# Patient Record
Sex: FEMALE | Race: WHITE | NOT HISPANIC OR LATINO | Employment: UNEMPLOYED | URBAN - METROPOLITAN AREA
[De-identification: names, ages, dates, MRNs, and addresses within clinical notes are randomized per-mention and may not be internally consistent; named-entity substitution may affect disease eponyms.]

---

## 2017-10-05 ENCOUNTER — APPOINTMENT (OUTPATIENT)
Dept: LAB | Facility: CLINIC | Age: 42
End: 2017-10-05
Payer: COMMERCIAL

## 2017-10-05 ENCOUNTER — TRANSCRIBE ORDERS (OUTPATIENT)
Dept: MRI IMAGING | Facility: CLINIC | Age: 42
End: 2017-10-05

## 2017-10-05 DIAGNOSIS — L65.9 BALDNESS: ICD-10-CM

## 2017-10-05 DIAGNOSIS — Z79.899 NEED FOR PROPHYLACTIC CHEMOTHERAPY: Primary | ICD-10-CM

## 2017-10-05 DIAGNOSIS — E28.2 POLYCYSTIC OVARIES: Primary | ICD-10-CM

## 2017-10-05 DIAGNOSIS — E66.01 MORBID OBESITY (HCC): ICD-10-CM

## 2017-10-05 DIAGNOSIS — Z79.899 NEED FOR PROPHYLACTIC CHEMOTHERAPY: ICD-10-CM

## 2017-10-05 DIAGNOSIS — E28.2 POLYCYSTIC OVARIES: ICD-10-CM

## 2017-10-05 LAB
25(OH)D3 SERPL-MCNC: 16 NG/ML (ref 30–100)
ALBUMIN SERPL BCP-MCNC: 3.7 G/DL (ref 3.5–5)
ALP SERPL-CCNC: 65 U/L (ref 46–116)
ALT SERPL W P-5'-P-CCNC: 22 U/L (ref 12–78)
ANION GAP SERPL CALCULATED.3IONS-SCNC: 7 MMOL/L (ref 4–13)
AST SERPL W P-5'-P-CCNC: 12 U/L (ref 5–45)
BASOPHILS # BLD AUTO: 0.01 THOUSANDS/ΜL (ref 0–0.1)
BASOPHILS NFR BLD AUTO: 0 % (ref 0–1)
BILIRUB DIRECT SERPL-MCNC: 0.1 MG/DL (ref 0–0.2)
BILIRUB SERPL-MCNC: 0.28 MG/DL (ref 0.2–1)
BUN SERPL-MCNC: 14 MG/DL (ref 5–25)
CALCIUM SERPL-MCNC: 9 MG/DL (ref 8.3–10.1)
CHLORIDE SERPL-SCNC: 107 MMOL/L (ref 100–108)
CHOLEST SERPL-MCNC: 151 MG/DL (ref 50–200)
CO2 SERPL-SCNC: 23 MMOL/L (ref 21–32)
CREAT SERPL-MCNC: 0.69 MG/DL (ref 0.6–1.3)
EOSINOPHIL # BLD AUTO: 0.11 THOUSAND/ΜL (ref 0–0.61)
EOSINOPHIL NFR BLD AUTO: 2 % (ref 0–6)
ERYTHROCYTE [DISTWIDTH] IN BLOOD BY AUTOMATED COUNT: 14.5 % (ref 11.6–15.1)
FSH SERPL-ACNC: 7 MIU/ML
GFR SERPL CREATININE-BSD FRML MDRD: 107 ML/MIN/1.73SQ M
GLUCOSE P FAST SERPL-MCNC: 108 MG/DL (ref 65–99)
HCT VFR BLD AUTO: 39.2 % (ref 34.8–46.1)
HDLC SERPL-MCNC: 46 MG/DL (ref 40–60)
HGB BLD-MCNC: 13.2 G/DL (ref 11.5–15.4)
LDLC SERPL CALC-MCNC: 87 MG/DL (ref 0–100)
LH SERPL-ACNC: 8.2 MIU/ML
LYMPHOCYTES # BLD AUTO: 1.7 THOUSANDS/ΜL (ref 0.6–4.47)
LYMPHOCYTES NFR BLD AUTO: 26 % (ref 14–44)
MCH RBC QN AUTO: 29.7 PG (ref 26.8–34.3)
MCHC RBC AUTO-ENTMCNC: 33.7 G/DL (ref 31.4–37.4)
MCV RBC AUTO: 88 FL (ref 82–98)
MONOCYTES # BLD AUTO: 0.44 THOUSAND/ΜL (ref 0.17–1.22)
MONOCYTES NFR BLD AUTO: 7 % (ref 4–12)
NEUTROPHILS # BLD AUTO: 4.34 THOUSANDS/ΜL (ref 1.85–7.62)
NEUTS SEG NFR BLD AUTO: 65 % (ref 43–75)
NRBC BLD AUTO-RTO: 0 /100 WBCS
PLATELET # BLD AUTO: 363 THOUSANDS/UL (ref 149–390)
PMV BLD AUTO: 11.5 FL (ref 8.9–12.7)
POTASSIUM SERPL-SCNC: 4.1 MMOL/L (ref 3.5–5.3)
PROT SERPL-MCNC: 7.6 G/DL (ref 6.4–8.2)
RBC # BLD AUTO: 4.44 MILLION/UL (ref 3.81–5.12)
SODIUM SERPL-SCNC: 137 MMOL/L (ref 136–145)
T4 FREE SERPL-MCNC: 0.99 NG/DL (ref 0.76–1.46)
TRIGL SERPL-MCNC: 90 MG/DL
TSH SERPL DL<=0.05 MIU/L-ACNC: 1.38 UIU/ML (ref 0.36–3.74)
VIT B12 SERPL-MCNC: 233 PG/ML (ref 100–900)
WBC # BLD AUTO: 6.62 THOUSAND/UL (ref 4.31–10.16)

## 2017-10-05 PROCEDURE — 85025 COMPLETE CBC W/AUTO DIFF WBC: CPT

## 2017-10-05 PROCEDURE — 82306 VITAMIN D 25 HYDROXY: CPT

## 2017-10-05 PROCEDURE — 80053 COMPREHEN METABOLIC PANEL: CPT

## 2017-10-05 PROCEDURE — 83002 ASSAY OF GONADOTROPIN (LH): CPT

## 2017-10-05 PROCEDURE — 86376 MICROSOMAL ANTIBODY EACH: CPT

## 2017-10-05 PROCEDURE — 83036 HEMOGLOBIN GLYCOSYLATED A1C: CPT

## 2017-10-05 PROCEDURE — 84402 ASSAY OF FREE TESTOSTERONE: CPT

## 2017-10-05 PROCEDURE — 83001 ASSAY OF GONADOTROPIN (FSH): CPT

## 2017-10-05 PROCEDURE — 84439 ASSAY OF FREE THYROXINE: CPT

## 2017-10-05 PROCEDURE — 84403 ASSAY OF TOTAL TESTOSTERONE: CPT

## 2017-10-05 PROCEDURE — 84443 ASSAY THYROID STIM HORMONE: CPT

## 2017-10-05 PROCEDURE — 86800 THYROGLOBULIN ANTIBODY: CPT

## 2017-10-05 PROCEDURE — 80061 LIPID PANEL: CPT

## 2017-10-05 PROCEDURE — 82627 DEHYDROEPIANDROSTERONE: CPT

## 2017-10-05 PROCEDURE — 36415 COLL VENOUS BLD VENIPUNCTURE: CPT

## 2017-10-05 PROCEDURE — 82607 VITAMIN B-12: CPT

## 2017-10-05 PROCEDURE — 82248 BILIRUBIN DIRECT: CPT

## 2017-10-05 PROCEDURE — 83498 ASY HYDROXYPROGESTERONE 17-D: CPT

## 2017-10-06 LAB
DHEA-S SERPL-MCNC: 106 UG/DL (ref 57.3–279.2)
EST. AVERAGE GLUCOSE BLD GHB EST-MCNC: 120 MG/DL
HBA1C MFR BLD: 5.8 % (ref 4.2–6.3)
TESTOST FREE SERPL-MCNC: 1.2 PG/ML (ref 0–4.2)
TESTOST SERPL-MCNC: 16 NG/DL (ref 8–48)
THYROGLOB AB SERPL-ACNC: <1 IU/ML (ref 0–0.9)
THYROPEROXIDASE AB SERPL-ACNC: 10 IU/ML (ref 0–34)

## 2017-10-11 LAB — 17OHP SERPL-MCNC: 36 NG/DL

## 2018-07-17 RX ORDER — LISDEXAMFETAMINE DIMESYLATE 30 MG/1
60 CAPSULE ORAL AS NEEDED
Refills: 0 | COMMUNITY
Start: 2018-05-18

## 2018-07-17 RX ORDER — ALPRAZOLAM 1 MG/1
1 TABLET ORAL DAILY PRN
Refills: 0 | COMMUNITY
Start: 2018-06-20

## 2018-07-17 RX ORDER — VORTIOXETINE 5 MG/1
5 TABLET, FILM COATED ORAL
Refills: 0 | COMMUNITY
Start: 2018-06-20 | End: 2018-07-19

## 2018-07-17 RX ORDER — TRAZODONE HYDROCHLORIDE 100 MG/1
TABLET ORAL
Refills: 0 | COMMUNITY
Start: 2018-06-20 | End: 2018-07-19 | Stop reason: SDUPTHER

## 2018-07-17 RX ORDER — VORTIOXETINE 10 MG/1
10 TABLET, FILM COATED ORAL
Refills: 0 | COMMUNITY
Start: 2018-06-20 | End: 2018-07-19 | Stop reason: SDUPTHER

## 2018-07-17 RX ORDER — BUSPIRONE HYDROCHLORIDE 5 MG/1
TABLET ORAL
COMMUNITY
Start: 2015-12-22 | End: 2018-07-19

## 2018-07-17 RX ORDER — OMEPRAZOLE 20 MG/1
1 CAPSULE, DELAYED RELEASE ORAL DAILY
COMMUNITY
Start: 2015-10-26 | End: 2018-07-19

## 2018-07-17 RX ORDER — TOPIRAMATE 100 MG/1
100 TABLET, FILM COATED ORAL DAILY
Refills: 1 | COMMUNITY
Start: 2018-04-17 | End: 2018-07-19

## 2018-07-17 RX ORDER — LAMOTRIGINE 100 MG/1
TABLET ORAL
COMMUNITY
Start: 2015-10-15 | End: 2018-07-19

## 2018-07-17 RX ORDER — ERGOCALCIFEROL 1.25 MG/1
50000 CAPSULE ORAL WEEKLY
Refills: 0 | COMMUNITY
Start: 2018-04-17 | End: 2020-03-10

## 2018-07-19 ENCOUNTER — OFFICE VISIT (OUTPATIENT)
Dept: INTERNAL MEDICINE CLINIC | Facility: CLINIC | Age: 43
End: 2018-07-19
Payer: COMMERCIAL

## 2018-07-19 ENCOUNTER — LAB (OUTPATIENT)
Dept: LAB | Facility: CLINIC | Age: 43
End: 2018-07-19
Payer: COMMERCIAL

## 2018-07-19 VITALS
WEIGHT: 261.6 LBS | BODY MASS INDEX: 39.65 KG/M2 | HEART RATE: 98 BPM | SYSTOLIC BLOOD PRESSURE: 110 MMHG | DIASTOLIC BLOOD PRESSURE: 80 MMHG | HEIGHT: 68 IN

## 2018-07-19 DIAGNOSIS — E78.5 DYSLIPIDEMIA: ICD-10-CM

## 2018-07-19 DIAGNOSIS — Z13.6 SCREENING FOR CARDIOVASCULAR CONDITION: ICD-10-CM

## 2018-07-19 DIAGNOSIS — F41.9 ANXIETY: ICD-10-CM

## 2018-07-19 DIAGNOSIS — R73.01 IMPAIRED FASTING BLOOD SUGAR: ICD-10-CM

## 2018-07-19 DIAGNOSIS — N60.09 CYST OF BREAST, UNSPECIFIED LATERALITY: ICD-10-CM

## 2018-07-19 DIAGNOSIS — K80.20 CALCULUS OF GALLBLADDER WITHOUT CHOLECYSTITIS WITHOUT OBSTRUCTION: ICD-10-CM

## 2018-07-19 DIAGNOSIS — F32.A DEPRESSION, UNSPECIFIED DEPRESSION TYPE: Primary | ICD-10-CM

## 2018-07-19 DIAGNOSIS — E53.8 VITAMIN B 12 DEFICIENCY: ICD-10-CM

## 2018-07-19 DIAGNOSIS — D64.9 ANEMIA, UNSPECIFIED TYPE: ICD-10-CM

## 2018-07-19 DIAGNOSIS — G47.33 OBSTRUCTIVE SLEEP APNEA: ICD-10-CM

## 2018-07-19 DIAGNOSIS — E55.9 VITAMIN D DEFICIENCY: ICD-10-CM

## 2018-07-19 LAB
BASOPHILS # BLD AUTO: 0.03 THOUSANDS/ΜL (ref 0–0.1)
BASOPHILS NFR BLD AUTO: 0 % (ref 0–1)
EOSINOPHIL # BLD AUTO: 0.1 THOUSAND/ΜL (ref 0–0.61)
EOSINOPHIL NFR BLD AUTO: 1 % (ref 0–6)
ERYTHROCYTE [DISTWIDTH] IN BLOOD BY AUTOMATED COUNT: 13.7 % (ref 11.6–15.1)
EST. AVERAGE GLUCOSE BLD GHB EST-MCNC: 105 MG/DL
HBA1C MFR BLD: 5.3 % (ref 4.2–6.3)
HCT VFR BLD AUTO: 43.4 % (ref 34.8–46.1)
HGB BLD-MCNC: 14 G/DL (ref 11.5–15.4)
IMM GRANULOCYTES # BLD AUTO: 0.03 THOUSAND/UL (ref 0–0.2)
IMM GRANULOCYTES NFR BLD AUTO: 0 % (ref 0–2)
LYMPHOCYTES # BLD AUTO: 1.61 THOUSANDS/ΜL (ref 0.6–4.47)
LYMPHOCYTES NFR BLD AUTO: 22 % (ref 14–44)
MCH RBC QN AUTO: 29 PG (ref 26.8–34.3)
MCHC RBC AUTO-ENTMCNC: 32.3 G/DL (ref 31.4–37.4)
MCV RBC AUTO: 90 FL (ref 82–98)
MONOCYTES # BLD AUTO: 0.57 THOUSAND/ΜL (ref 0.17–1.22)
MONOCYTES NFR BLD AUTO: 8 % (ref 4–12)
NEUTROPHILS # BLD AUTO: 5.09 THOUSANDS/ΜL (ref 1.85–7.62)
NEUTS SEG NFR BLD AUTO: 69 % (ref 43–75)
NRBC BLD AUTO-RTO: 0 /100 WBCS
PLATELET # BLD AUTO: 333 THOUSANDS/UL (ref 149–390)
PMV BLD AUTO: 11.4 FL (ref 8.9–12.7)
RBC # BLD AUTO: 4.83 MILLION/UL (ref 3.81–5.12)
WBC # BLD AUTO: 7.43 THOUSAND/UL (ref 4.31–10.16)

## 2018-07-19 PROCEDURE — 36415 COLL VENOUS BLD VENIPUNCTURE: CPT

## 2018-07-19 PROCEDURE — 83540 ASSAY OF IRON: CPT

## 2018-07-19 PROCEDURE — 80053 COMPREHEN METABOLIC PANEL: CPT

## 2018-07-19 PROCEDURE — 80061 LIPID PANEL: CPT

## 2018-07-19 PROCEDURE — 82728 ASSAY OF FERRITIN: CPT

## 2018-07-19 PROCEDURE — 84443 ASSAY THYROID STIM HORMONE: CPT

## 2018-07-19 PROCEDURE — 82652 VIT D 1 25-DIHYDROXY: CPT

## 2018-07-19 PROCEDURE — 82746 ASSAY OF FOLIC ACID SERUM: CPT

## 2018-07-19 PROCEDURE — 83918 ORGANIC ACIDS TOTAL QUANT: CPT

## 2018-07-19 PROCEDURE — 82607 VITAMIN B-12: CPT

## 2018-07-19 PROCEDURE — 82533 TOTAL CORTISOL: CPT

## 2018-07-19 PROCEDURE — 83550 IRON BINDING TEST: CPT

## 2018-07-19 PROCEDURE — 99396 PREV VISIT EST AGE 40-64: CPT | Performed by: NURSE PRACTITIONER

## 2018-07-19 PROCEDURE — 83036 HEMOGLOBIN GLYCOSYLATED A1C: CPT

## 2018-07-19 PROCEDURE — 85025 COMPLETE CBC W/AUTO DIFF WBC: CPT

## 2018-07-19 RX ORDER — TRAZODONE HYDROCHLORIDE 100 MG/1
TABLET ORAL
Qty: 60 TABLET | Refills: 0
Start: 2018-07-19 | End: 2020-03-10

## 2018-07-19 RX ORDER — VORTIOXETINE 10 MG/1
TABLET, FILM COATED ORAL
Qty: 60 TABLET | Refills: 0
Start: 2018-07-19 | End: 2019-11-01 | Stop reason: ALTCHOICE

## 2018-07-19 NOTE — PATIENT INSTRUCTIONS
Depression/anxiety/posttraumatic stress following with Dr Stacey Harrison    Obstructive sleep apnea using CPAP    History of of gallstones currently asymptomatic continue to monitor    Impaired fasting glucose check A1c    Health maintenance prescription for mammogram given    Check routine labs follow-up any abnormal

## 2018-07-19 NOTE — PROGRESS NOTES
Assessment/Plan:    Patient Instructions   Depression/anxiety/posttraumatic stress following with Dr Jose Delgado    Obstructive sleep apnea using CPAP    History of of gallstones currently asymptomatic continue to monitor    Impaired fasting glucose check A1c    Health maintenance prescription for mammogram given    Check routine labs follow-up any abnormal         Diagnoses and all orders for this visit:    Depression, unspecified depression type    Anxiety  -     TRINTELLIX 10 MG tablet; Take 2 tabs daily  -     traZODone (DESYREL) 100 mg tablet; Take 2 tabs  -     TSH, 3rd generation; Future  -     Cortisol; Future    Cyst of breast, unspecified laterality  -     Mammo diagnostic bilateral w cad; Future    Obstructive sleep apnea    Dyslipidemia  -     CBC and differential; Future  -     Comprehensive metabolic panel; Future    Calculus of gallbladder without cholecystitis without obstruction    Screening for cardiovascular condition  -     Lipid panel; Future    Impaired fasting blood sugar  -     Hemoglobin A1C; Future  -     Cortisol; Future    Vitamin D deficiency  -     Vitamin D 1,25 dihydroxy; Future    Anemia, unspecified type  -     Iron Panel; Future    Vitamin B 12 deficiency  -     Vitamin B12; Future  -     Folate; Future  -     Methylmalonic acid, serum; Future    Other orders  -     ALPRAZolam (XANAX) 1 mg tablet; Take 1 mg by mouth daily as needed for anxiety  -     Discontinue: busPIRone (BUSPAR) 5 mg tablet; Take by mouth  -     ergocalciferol (VITAMIN D2) 50,000 units; Take 50,000 Units by mouth once a week  -     Discontinue: lamoTRIgine (LaMICtal) 100 mg tablet; Take by mouth  -     VYVANSE 30 MG capsule; Take 30 mg by mouth daily  -     Discontinue: omeprazole (PriLOSEC) 20 mg delayed release capsule; Take 1 capsule by mouth daily  -     Discontinue: topiramate (TOPAMAX) 100 mg tablet;  Take 100 mg by mouth daily  -     Discontinue: traZODone (DESYREL) 100 mg tablet; TAKEK 2 TABLETS AT BEDTIME AS NEEDED FOR INSOMNIA  -     Discontinue: TRINTELLIX 10 MG tablet; Take 10 mg by mouth daily at bedtime  -     Discontinue: TRINTELLIX 5 MG tablet; Take 5 mg by mouth daily at bedtime         Subjective:      Patient ID: Betty Duran is a 39 y o  female    Patient has not been seen in approximately 2 and half years  She is on a fasting diet right now she just lost 14 pounds she will continue with weight loss efforts  Using CPAP  History of gallstones not having any problems  Following with Psychiatry for anxiety depression and posttraumatic stress  Active no formal exercise          Current Outpatient Prescriptions:     ALPRAZolam (XANAX) 1 mg tablet, Take 1 mg by mouth daily as needed for anxiety, Disp: , Rfl: 0    ergocalciferol (VITAMIN D2) 50,000 units, Take 50,000 Units by mouth once a week, Disp: , Rfl: 0    traZODone (DESYREL) 100 mg tablet, Take 2 tabs, Disp: 60 tablet, Rfl: 0    TRINTELLIX 10 MG tablet, Take 2 tabs daily, Disp: 60 tablet, Rfl: 0    VYVANSE 30 MG capsule, Take 30 mg by mouth daily, Disp: , Rfl: 0    No results found for this or any previous visit (from the past 1008 hour(s))  The following portions of the patient's history were reviewed and updated as appropriate: allergies, current medications, past family history, past medical history, past social history, past surgical history and problem list      Review of Systems   Constitutional: Negative for appetite change, chills, diaphoresis, fatigue, fever and unexpected weight change  HENT: Negative for postnasal drip and sneezing  Eyes: Negative for visual disturbance  Respiratory: Negative for chest tightness and shortness of breath  Cardiovascular: Negative for chest pain, palpitations and leg swelling  Gastrointestinal: Negative for abdominal pain and blood in stool  Endocrine: Negative for cold intolerance, heat intolerance, polydipsia, polyphagia and polyuria     Genitourinary: Negative for difficulty urinating, dysuria, frequency and urgency  Musculoskeletal: Negative for arthralgias and myalgias  Skin: Negative for rash and wound  Neurological: Negative for dizziness, weakness, light-headedness and headaches  Hematological: Negative for adenopathy  Psychiatric/Behavioral: Negative for confusion, dysphoric mood and sleep disturbance  The patient is not nervous/anxious  Objective:      Vitals:    07/19/18 1418   BP: 110/80   Pulse: 98          Physical Exam   Constitutional: She is oriented to person, place, and time  She appears well-developed  No distress  HENT:   Head: Normocephalic and atraumatic  Nose: Nose normal    Mouth/Throat: Oropharynx is clear and moist    Eyes: Conjunctivae and EOM are normal  Pupils are equal, round, and reactive to light  Neck: Normal range of motion  Neck supple  No JVD present  No tracheal deviation present  No thyromegaly present  Cardiovascular: Normal rate, regular rhythm, normal heart sounds and intact distal pulses  Exam reveals no gallop and no friction rub  No murmur heard  Pulmonary/Chest: Effort normal and breath sounds normal  No respiratory distress  She has no wheezes  She has no rales  Abdominal: Soft  Bowel sounds are normal  She exhibits no distension  There is no tenderness  Musculoskeletal: Normal range of motion  She exhibits no edema  Lymphadenopathy:     She has no cervical adenopathy  Neurological: She is alert and oriented to person, place, and time  No cranial nerve deficit  Skin: Skin is warm and dry  No rash noted  She is not diaphoretic  Psychiatric: She has a normal mood and affect   Her behavior is normal  Judgment and thought content normal

## 2018-07-20 LAB
ALBUMIN SERPL BCP-MCNC: 4.4 G/DL (ref 3.5–5)
ALP SERPL-CCNC: 64 U/L (ref 46–116)
ALT SERPL W P-5'-P-CCNC: 27 U/L (ref 12–78)
ANION GAP SERPL CALCULATED.3IONS-SCNC: 9 MMOL/L (ref 4–13)
AST SERPL W P-5'-P-CCNC: 14 U/L (ref 5–45)
BILIRUB SERPL-MCNC: 0.63 MG/DL (ref 0.2–1)
BUN SERPL-MCNC: 10 MG/DL (ref 5–25)
CALCIUM SERPL-MCNC: 9.7 MG/DL (ref 8.3–10.1)
CHLORIDE SERPL-SCNC: 105 MMOL/L (ref 100–108)
CHOLEST SERPL-MCNC: 182 MG/DL (ref 50–200)
CO2 SERPL-SCNC: 23 MMOL/L (ref 21–32)
CORTIS SERPL-MCNC: 11.2 UG/DL
CREAT SERPL-MCNC: 0.74 MG/DL (ref 0.6–1.3)
FERRITIN SERPL-MCNC: 49 NG/ML (ref 8–388)
FOLATE SERPL-MCNC: 17.9 NG/ML (ref 3.1–17.5)
GFR SERPL CREATININE-BSD FRML MDRD: 99 ML/MIN/1.73SQ M
GLUCOSE P FAST SERPL-MCNC: 70 MG/DL (ref 65–99)
HDLC SERPL-MCNC: 38 MG/DL (ref 40–60)
IRON SATN MFR SERPL: 15 %
IRON SERPL-MCNC: 62 UG/DL (ref 50–170)
LDLC SERPL CALC-MCNC: 124 MG/DL (ref 0–100)
NONHDLC SERPL-MCNC: 144 MG/DL
POTASSIUM SERPL-SCNC: 4.1 MMOL/L (ref 3.5–5.3)
PROT SERPL-MCNC: 8 G/DL (ref 6.4–8.2)
SODIUM SERPL-SCNC: 137 MMOL/L (ref 136–145)
TIBC SERPL-MCNC: 404 UG/DL (ref 250–450)
TRIGL SERPL-MCNC: 102 MG/DL
TSH SERPL DL<=0.05 MIU/L-ACNC: 1.15 UIU/ML (ref 0.36–3.74)
VIT B12 SERPL-MCNC: 3712 PG/ML (ref 100–900)

## 2018-07-23 LAB
1,25(OH)2D3 SERPL-MCNC: 105 PG/ML (ref 19.9–79.3)
METHYLMALONATE SERPL-SCNC: 56 NMOL/L (ref 0–378)
SL AMB DISCLAIMER: NORMAL

## 2018-07-25 ENCOUNTER — TELEPHONE (OUTPATIENT)
Dept: INTERNAL MEDICINE CLINIC | Facility: CLINIC | Age: 43
End: 2018-07-25

## 2018-07-25 NOTE — TELEPHONE ENCOUNTER
----- Message from Bebeto Villasenor sent at 7/25/2018  2:05 PM EDT -----  Her labs look good  Her vitamin d is very high, if she is taking a supplement she can probably hold of for a month

## 2018-07-25 NOTE — PROGRESS NOTES
Her labs look good  Her vitamin d is very high, if she is taking a supplement she can probably hold of for a month

## 2018-07-26 ENCOUNTER — TELEPHONE (OUTPATIENT)
Dept: INTERNAL MEDICINE CLINIC | Facility: CLINIC | Age: 43
End: 2018-07-26

## 2018-07-26 NOTE — TELEPHONE ENCOUNTER
----- Message from Delaney Crain sent at 7/26/2018 12:09 PM EDT -----  Tried home/cell phone no answer nor voicemail box set up  [facility] on [address] (emergency contact phone)  ----- Message -----  From: Shirley Hathaway MA  Sent: 7/25/2018   2:23 PM  To: [facility]    Tried phone number not available, and tried cell phone no answer nor voicemail box     Will CB later  ----- Message -----  From: TORI Machado  Sent: 7/25/2018   2:05 PM  To: [facility]    Her labs look good  Her vitamin d is very high, if she is taking a supplement she can probably hold of for a month

## 2018-07-27 ENCOUNTER — TELEPHONE (OUTPATIENT)
Dept: INTERNAL MEDICINE CLINIC | Facility: CLINIC | Age: 43
End: 2018-07-27

## 2018-07-27 NOTE — TELEPHONE ENCOUNTER
im not worried about it, its just rare to see a high vitmain d since most pts are deficient  She just certainly dose not need supplement   Otherwise labs noraml

## 2018-10-12 ENCOUNTER — HOSPITAL ENCOUNTER (OUTPATIENT)
Dept: MAMMOGRAPHY | Facility: CLINIC | Age: 43
Discharge: HOME | End: 2018-10-12
Payer: COMMERCIAL

## 2018-10-12 DIAGNOSIS — N60.09 CYST OF BREAST, UNSPECIFIED LATERALITY: ICD-10-CM

## 2018-10-12 PROCEDURE — 77067 SCR MAMMO BI INCL CAD: CPT

## 2018-10-12 PROCEDURE — 77063 BREAST TOMOSYNTHESIS BI: CPT

## 2018-10-22 ENCOUNTER — TELEPHONE (OUTPATIENT)
Dept: INTERNAL MEDICINE CLINIC | Facility: CLINIC | Age: 43
End: 2018-10-22

## 2018-10-23 NOTE — TELEPHONE ENCOUNTER
Mammogram looks negative, routine screening mammogram recommended in 1 yr        Martin Alva, anything suspicious on exam that your want u/s or referral?

## 2018-10-23 NOTE — TELEPHONE ENCOUNTER
Called pt and informed her of result per Dr Beena White and Colten Chowdary, pt was happy to hear yet surprised because she has had a suspicious MAMMO in the past

## 2019-01-04 ENCOUNTER — OFFICE VISIT (OUTPATIENT)
Dept: INTERNAL MEDICINE CLINIC | Facility: CLINIC | Age: 44
End: 2019-01-04
Payer: COMMERCIAL

## 2019-01-04 VITALS
BODY MASS INDEX: 41.49 KG/M2 | HEART RATE: 82 BPM | RESPIRATION RATE: 16 BRPM | HEIGHT: 68 IN | WEIGHT: 273.8 LBS | DIASTOLIC BLOOD PRESSURE: 90 MMHG | TEMPERATURE: 98.1 F | SYSTOLIC BLOOD PRESSURE: 120 MMHG

## 2019-01-04 DIAGNOSIS — J45.901 EXACERBATION OF ASTHMA, UNSPECIFIED ASTHMA SEVERITY, UNSPECIFIED WHETHER PERSISTENT: ICD-10-CM

## 2019-01-04 DIAGNOSIS — G47.33 OBSTRUCTIVE SLEEP APNEA: ICD-10-CM

## 2019-01-04 DIAGNOSIS — J40 BRONCHITIS: Primary | ICD-10-CM

## 2019-01-04 DIAGNOSIS — F31.81 BIPOLAR II DISORDER (HCC): ICD-10-CM

## 2019-01-04 PROCEDURE — 99214 OFFICE O/P EST MOD 30 MIN: CPT | Performed by: PHYSICIAN ASSISTANT

## 2019-01-04 PROCEDURE — 3008F BODY MASS INDEX DOCD: CPT | Performed by: PHYSICIAN ASSISTANT

## 2019-01-04 RX ORDER — ALBUTEROL SULFATE 90 UG/1
2 AEROSOL, METERED RESPIRATORY (INHALATION) EVERY 6 HOURS PRN
Qty: 1 INHALER | Refills: 0 | Status: SHIPPED | OUTPATIENT
Start: 2019-01-04 | End: 2019-02-01 | Stop reason: CLARIF

## 2019-01-04 RX ORDER — PREDNISONE 10 MG/1
TABLET ORAL
Qty: 20 TABLET | Refills: 0 | Status: SHIPPED | OUTPATIENT
Start: 2019-01-04 | End: 2019-02-01 | Stop reason: CLARIF

## 2019-01-04 NOTE — PROGRESS NOTES
Assessment/Plan:  Cough variant asthma at 1st she will try Tylenol ibuprofen and a Ventolin inhaler  If no better after 2 days she will start a course of prednisone  Currently her physical exam is unremarkable       Diagnoses and all orders for this visit:    Bronchitis  -     predniSONE 10 mg tablet; Take 40 milligrams for 2 days, 30 milligrams for 2 days, 20 milligrams for 2 days, 10 milligrams for 2 days, then stop  -     albuterol (VENTOLIN HFA) 90 mcg/act inhaler; Inhale 2 puffs every 6 (six) hours as needed for wheezing    Exacerbation of asthma, unspecified asthma severity, unspecified whether persistent  -     predniSONE 10 mg tablet; Take 40 milligrams for 2 days, 30 milligrams for 2 days, 20 milligrams for 2 days, 10 milligrams for 2 days, then stop  -     albuterol (VENTOLIN HFA) 90 mcg/act inhaler; Inhale 2 puffs every 6 (six) hours as needed for wheezing    Bipolar II disorder (HCC)    Obstructive sleep apnea  -     predniSONE 10 mg tablet; Take 40 milligrams for 2 days, 30 milligrams for 2 days, 20 milligrams for 2 days, 10 milligrams for 2 days, then stop  -     albuterol (VENTOLIN HFA) 90 mcg/act inhaler; Inhale 2 puffs every 6 (six) hours as needed for wheezing        No problem-specific Assessment & Plan notes found for this encounter  Subjective:      Patient ID: Sissy Boas is a 39 y o  female  One-week history of coughing at times she feels wheezy and short of breath  Sometimes her cough sounds loose but she is not able to cough up any phlegm  No sore throat ear trouble lymph nodes or facial pain  She has been normally active and well until her present problem began history of bipolar disorder and hyperactivity she has gone off of the stimulants and she is doing well  No fever or chills she hears herself wheezing at times she has sleep apnea she uses BiPAP with no problem          The following portions of the patient's history were reviewed and updated as appropriate:   She has a past medical history of Depression; Hypertension; and Obesity  ,   does not have any pertinent problems on file  ,   has a past surgical history that includes  section  ,  family history includes Breast cancer in her maternal grandmother; Coronary artery disease in her family; Heart attack in her family; Heart failure in her father and mother  ,   reports that she quit smoking about 28 years ago  Her smoking use included Cigarettes  She has a 16 00 pack-year smoking history  She has never used smokeless tobacco  She reports that she drinks alcohol  She reports that she does not use drugs  ,  has No Known Allergies     Current Outpatient Prescriptions   Medication Sig Dispense Refill    ALPRAZolam (XANAX) 1 mg tablet Take 1 mg by mouth daily as needed for anxiety  0    traZODone (DESYREL) 100 mg tablet Take 2 tabs 60 tablet 0    TRINTELLIX 10 MG tablet Take 2 tabs daily 60 tablet 0    VYVANSE 30 MG capsule Take 30 mg by mouth daily  0    albuterol (VENTOLIN HFA) 90 mcg/act inhaler Inhale 2 puffs every 6 (six) hours as needed for wheezing 1 Inhaler 0    ergocalciferol (VITAMIN D2) 50,000 units Take 50,000 Units by mouth once a week  0    predniSONE 10 mg tablet Take 40 milligrams for 2 days, 30 milligrams for 2 days, 20 milligrams for 2 days, 10 milligrams for 2 days, then stop 20 tablet 0     No current facility-administered medications for this visit  Review of Systems   Constitutional: Negative for activity change, appetite change, chills, diaphoresis, fatigue, fever and unexpected weight change  HENT: Negative for congestion, dental problem, drooling, ear discharge, ear pain, facial swelling, hearing loss, nosebleeds, postnasal drip, rhinorrhea, sinus pain, sinus pressure, sneezing, sore throat, tinnitus, trouble swallowing and voice change  Eyes: Negative for photophobia, pain, discharge, redness, itching and visual disturbance  Respiratory: Positive for cough and shortness of breath  Negative for apnea, choking, chest tightness, wheezing and stridor  Cardiovascular: Negative for chest pain, palpitations and leg swelling  Gastrointestinal: Negative for abdominal distention, abdominal pain, anal bleeding, blood in stool, constipation, diarrhea, nausea, rectal pain and vomiting  Endocrine: Negative for cold intolerance, heat intolerance, polydipsia, polyphagia and polyuria  Genitourinary: Negative for decreased urine volume, difficulty urinating, dysuria, enuresis, flank pain, frequency, genital sores, hematuria and urgency  Musculoskeletal: Negative for arthralgias, back pain, gait problem, joint swelling, myalgias, neck pain and neck stiffness  Skin: Negative for color change, pallor, rash and wound  Neurological: Negative for dizziness, tremors, seizures, syncope, facial asymmetry, speech difficulty, weakness, light-headedness, numbness and headaches  Hematological: Negative for adenopathy  Does not bruise/bleed easily  Psychiatric/Behavioral: Negative for agitation, behavioral problems, confusion, decreased concentration, dysphoric mood, hallucinations, self-injury, sleep disturbance and suicidal ideas  The patient is not nervous/anxious and is not hyperactive  Objective:  Vitals:    01/04/19 1607   BP: 120/90   BP Location: Left arm   Patient Position: Sitting   Cuff Size: Standard   Pulse: 82   Resp: 16   Temp: 98 1 °F (36 7 °C)   TempSrc: Oral   Weight: 124 kg (273 lb 12 8 oz)   Height: 5' 8" (1 727 m)     Body mass index is 41 63 kg/m²  Physical Exam   Constitutional: She is oriented to person, place, and time  She appears well-developed  HENT:   Head: Normocephalic  Right Ear: External ear normal    Left Ear: External ear normal    Mouth/Throat: Oropharynx is clear and moist    Eyes: Pupils are equal, round, and reactive to light  Conjunctivae are normal    Neck: Neck supple  No thyromegaly present     Cardiovascular: Normal rate, regular rhythm, normal heart sounds and intact distal pulses  Pulmonary/Chest: Effort normal and breath sounds normal    Abdominal: Soft  Bowel sounds are normal    Musculoskeletal: Normal range of motion  Neurological: She is alert and oriented to person, place, and time  She has normal reflexes  Skin: Skin is warm and dry

## 2019-02-01 ENCOUNTER — OFFICE VISIT (OUTPATIENT)
Dept: INTERNAL MEDICINE CLINIC | Facility: CLINIC | Age: 44
End: 2019-02-01
Payer: COMMERCIAL

## 2019-02-01 VITALS
OXYGEN SATURATION: 96 % | TEMPERATURE: 98.1 F | DIASTOLIC BLOOD PRESSURE: 80 MMHG | SYSTOLIC BLOOD PRESSURE: 118 MMHG | WEIGHT: 274 LBS | HEART RATE: 90 BPM | BODY MASS INDEX: 41.66 KG/M2

## 2019-02-01 DIAGNOSIS — F31.81 BIPOLAR II DISORDER (HCC): ICD-10-CM

## 2019-02-01 DIAGNOSIS — F41.9 ANXIETY: ICD-10-CM

## 2019-02-01 DIAGNOSIS — G47.33 OBSTRUCTIVE SLEEP APNEA: ICD-10-CM

## 2019-02-01 DIAGNOSIS — M25.562 ACUTE PAIN OF LEFT KNEE: Primary | ICD-10-CM

## 2019-02-01 PROCEDURE — 99214 OFFICE O/P EST MOD 30 MIN: CPT | Performed by: PHYSICIAN ASSISTANT

## 2019-02-01 PROCEDURE — 1036F TOBACCO NON-USER: CPT | Performed by: PHYSICIAN ASSISTANT

## 2019-02-01 NOTE — PROGRESS NOTES
Assessment/Plan:  Left knee pain most likely from her increased activity small effusion noted flexion is normal knee is stable some mixed symptoms of patellar tendinitis sciatica or parcel bleed cartilage currently she is asymptomatic she will use regular anti inflammatory doses around the clock for the next week and she will try to rest if her symptoms do not improve she will return for further workup       Diagnoses and all orders for this visit:    Acute pain of left knee    Anxiety    Bipolar II disorder ([address] )    Obstructive sleep apnea        No problem-specific Assessment & Plan notes found for this encounter  Subjective:      Patient ID: Janeth Jones is a 39 y o  female  She has been doing more activity than usual in the last 2 weeks and she has developed pain stiffness of her left knee worse walking downstairs  Some occasional swelling of the left knee but no calf pain no redness no fever she recalls no specific injury no previous history of knee pain last few evenings pain was somewhat worse and she had some pain in the left sciatic distribution that has gone away no lower back pain      Knee Pain    Pertinent negatives include no numbness  The following portions of the patient's history were reviewed and updated as appropriate:   She has a past medical history of Depression; Hypertension; and Obesity  ,   does not have any pertinent problems on file  ,   has a past surgical history that includes  section  ,  family history includes Breast cancer in her maternal grandmother; Coronary artery disease in her family; Heart attack in her family; Heart failure in her father and mother  ,   reports that she quit smoking about 28 years ago  Her smoking use included Cigarettes  She has a 16 00 pack-year smoking history  She has never used smokeless tobacco  She reports that she drinks alcohol  She reports that she does not use drugs  ,  has No Known Allergies     Current Outpatient Prescriptions Medication Sig Dispense Refill    ALPRAZolam (XANAX) 1 mg tablet Take 1 mg by mouth daily as needed for anxiety  0    traZODone (DESYREL) 100 mg tablet Take 2 tabs 60 tablet 0    TRINTELLIX 10 MG tablet Take 2 tabs daily 60 tablet 0    VYVANSE 30 MG capsule Take 30 mg by mouth daily  0    ergocalciferol (VITAMIN D2) 50,000 units Take 50,000 Units by mouth once a week  0     No current facility-administered medications for this visit  Review of Systems   Constitutional: Negative for activity change, appetite change, chills, diaphoresis, fatigue, fever and unexpected weight change  HENT: Negative for congestion, dental problem, drooling, ear discharge, ear pain, facial swelling, hearing loss, nosebleeds, postnasal drip, rhinorrhea, sinus pain, sinus pressure, sneezing, sore throat, tinnitus, trouble swallowing and voice change  Eyes: Negative for photophobia, pain, discharge, redness, itching and visual disturbance  Respiratory: Negative for apnea, cough, choking, chest tightness, shortness of breath, wheezing and stridor  Cardiovascular: Negative for chest pain, palpitations and leg swelling  Gastrointestinal: Negative for abdominal distention, abdominal pain, anal bleeding, blood in stool, constipation, diarrhea, nausea, rectal pain and vomiting  Endocrine: Negative for cold intolerance, heat intolerance, polydipsia, polyphagia and polyuria  Genitourinary: Negative for decreased urine volume, difficulty urinating, dysuria, enuresis, flank pain, frequency, genital sores, hematuria and urgency  Musculoskeletal: Positive for arthralgias  Negative for back pain, gait problem, joint swelling, myalgias, neck pain and neck stiffness  Skin: Negative for color change, pallor, rash and wound  Neurological: Negative for dizziness, tremors, seizures, syncope, facial asymmetry, speech difficulty, weakness, light-headedness, numbness and headaches  Hematological: Negative for adenopathy   Does not bruise/bleed easily  Psychiatric/Behavioral: Negative for agitation, behavioral problems, confusion, decreased concentration, dysphoric mood, hallucinations, self-injury, sleep disturbance and suicidal ideas  The patient is not nervous/anxious and is not hyperactive  Objective:  Vitals:    02/01/19 1640   BP: 118/80   BP Location: Left arm   Patient Position: Sitting   Cuff Size: Standard   Pulse: 90   Temp: 98 1 °F (36 7 °C)   SpO2: 96%   Weight: 124 kg (274 lb)     Body mass index is 41 66 kg/m²  Physical Exam   Constitutional: She is oriented to person, place, and time  She appears well-developed  Obese   HENT:   Head: Normocephalic  Right Ear: External ear normal    Left Ear: External ear normal    Mouth/Throat: Oropharynx is clear and moist    Eyes: Pupils are equal, round, and reactive to light  Conjunctivae are normal    Neck: Neck supple  No thyromegaly present  Cardiovascular: Normal rate, regular rhythm, normal heart sounds and intact distal pulses  Pulmonary/Chest: Effort normal and breath sounds normal    Abdominal: Soft  Bowel sounds are normal    Musculoskeletal: Normal range of motion  She exhibits deformity (Normal range of motion possible small effusions some tenderness with stressing patellar tendon)  She exhibits no edema  Neurological: She is alert and oriented to person, place, and time  She has normal reflexes  Skin: Skin is warm and dry

## 2019-03-26 ENCOUNTER — OFFICE VISIT (OUTPATIENT)
Dept: INTERNAL MEDICINE CLINIC | Facility: CLINIC | Age: 44
End: 2019-03-26
Payer: COMMERCIAL

## 2019-03-26 VITALS
TEMPERATURE: 98.1 F | HEIGHT: 68 IN | SYSTOLIC BLOOD PRESSURE: 138 MMHG | OXYGEN SATURATION: 95 % | DIASTOLIC BLOOD PRESSURE: 88 MMHG | BODY MASS INDEX: 42.68 KG/M2 | WEIGHT: 281.6 LBS | HEART RATE: 90 BPM

## 2019-03-26 DIAGNOSIS — E78.5 DYSLIPIDEMIA: ICD-10-CM

## 2019-03-26 DIAGNOSIS — R06.02 SOB (SHORTNESS OF BREATH): ICD-10-CM

## 2019-03-26 DIAGNOSIS — R07.89 CHEST TIGHTNESS: ICD-10-CM

## 2019-03-26 DIAGNOSIS — F41.9 ANXIETY: Primary | ICD-10-CM

## 2019-03-26 PROCEDURE — 3008F BODY MASS INDEX DOCD: CPT | Performed by: PHYSICIAN ASSISTANT

## 2019-03-26 PROCEDURE — 99214 OFFICE O/P EST MOD 30 MIN: CPT | Performed by: PHYSICIAN ASSISTANT

## 2019-03-26 PROCEDURE — 1036F TOBACCO NON-USER: CPT | Performed by: PHYSICIAN ASSISTANT

## 2019-03-26 NOTE — PROGRESS NOTES
Assessment/Plan:  Shortness of breath with minor exertion and some vague chest pain  Strong family history of heart disease will get a dobutamine stress echo and workup  Follow-up depending on the results of the tests she has cardiology follow-up in 6 weeks       Diagnoses and all orders for this visit:    Anxiety    Dyslipidemia    SOB (shortness of breath)  -     Echo stress test w contrast if indicated; Future  -     CBC and differential; Future  -     Comprehensive metabolic panel; Future  -     TSH, 3rd generation; Future  -     UA w Reflex to Microscopic w Reflex to Culture  -     D-dimer, quantitative; Future    Chest tightness  -     Echo stress test w contrast if indicated; Future  -     CBC and differential; Future  -     Comprehensive metabolic panel; Future  -     TSH, 3rd generation; Future  -     UA w Reflex to Microscopic w Reflex to Culture  -     D-dimer, quantitative; Future        No problem-specific Assessment & Plan notes found for this encounter  Subjective:      Patient ID: Gay Baker is a 39 y o  female  She has noticed worsening dyspnea with minor exertion  She feels short of breath after a flight of stairs which she feels is unusual she also has some very vague and nondescript chest tightness at times sometimes with exertion sometimes constant  no palpitations or sweatiness  No coughing  She always feels better when she takes is Xanax  History of bipolar disorder fairly well controlled followed by Psychiatry  A strong family history of early heart disease  She would like to exercise more because of her weight but her shortness of breath prevents her from doing this  The following portions of the patient's history were reviewed and updated as appropriate:   She has a past medical history of Depression, Hypertension, and Obesity  ,  does not have any pertinent problems on file  ,   has a past surgical history that includes  section  ,  family history includes Breast cancer in her maternal grandmother; Coronary artery disease in her family; Heart attack in her family; Heart failure in her father and mother  ,   reports that she quit smoking about 28 years ago  Her smoking use included cigarettes  She has a 16 00 pack-year smoking history  She has never used smokeless tobacco  She reports that she drinks alcohol  She reports that she does not use drugs  ,  has No Known Allergies     Current Outpatient Medications   Medication Sig Dispense Refill    ALPRAZolam (XANAX) 1 mg tablet Take 1 mg by mouth daily as needed for anxiety  0    traZODone (DESYREL) 100 mg tablet Take 2 tabs 60 tablet 0    TRINTELLIX 10 MG tablet Take 2 tabs daily 60 tablet 0    VYVANSE 30 MG capsule Take 30 mg by mouth daily  0    ergocalciferol (VITAMIN D2) 50,000 units Take 50,000 Units by mouth once a week  0     No current facility-administered medications for this visit  Review of Systems   Constitutional: Negative for activity change, appetite change, chills, diaphoresis, fatigue, fever and unexpected weight change  HENT: Negative for congestion, dental problem, drooling, ear discharge, ear pain, facial swelling, hearing loss, nosebleeds, postnasal drip, rhinorrhea, sinus pressure, sinus pain, sneezing, sore throat, tinnitus, trouble swallowing and voice change  Eyes: Negative for photophobia, pain, discharge, redness, itching and visual disturbance  Respiratory: Positive for shortness of breath  Negative for apnea, cough, choking, chest tightness, wheezing and stridor  Cardiovascular: Negative for chest pain, palpitations and leg swelling  Gastrointestinal: Negative for abdominal distention, abdominal pain, anal bleeding, blood in stool, constipation, diarrhea, nausea, rectal pain and vomiting  Endocrine: Negative for cold intolerance, heat intolerance, polydipsia, polyphagia and polyuria     Genitourinary: Negative for decreased urine volume, difficulty urinating, dysuria, enuresis, flank pain, frequency, genital sores, hematuria and urgency  Musculoskeletal: Negative for arthralgias, back pain, gait problem, joint swelling, myalgias, neck pain and neck stiffness  Skin: Negative for color change, pallor, rash and wound  Neurological: Negative for dizziness, tremors, seizures, syncope, facial asymmetry, speech difficulty, weakness, light-headedness, numbness and headaches  Hematological: Negative for adenopathy  Does not bruise/bleed easily  Psychiatric/Behavioral: Negative for agitation, behavioral problems, confusion, decreased concentration, dysphoric mood, hallucinations, self-injury, sleep disturbance and suicidal ideas  The patient is nervous/anxious  The patient is not hyperactive  Objective:  Vitals:    03/26/19 0917   BP: 138/88   BP Location: Left arm   Patient Position: Sitting   Pulse: 90   Temp: 98 1 °F (36 7 °C)   SpO2: 95%   Weight: 128 kg (281 lb 9 6 oz)   Height: 5' 8" (1 727 m)     Body mass index is 42 82 kg/m²  Physical Exam   Constitutional: She is oriented to person, place, and time  She appears well-developed  Obese   HENT:   Head: Normocephalic  Right Ear: External ear normal    Left Ear: External ear normal    Mouth/Throat: Oropharynx is clear and moist    Eyes: Pupils are equal, round, and reactive to light  Conjunctivae are normal    Neck: Neck supple  No thyromegaly present  Cardiovascular: Normal rate, regular rhythm, normal heart sounds and intact distal pulses  Pulmonary/Chest: Effort normal and breath sounds normal    Abdominal: Soft  Bowel sounds are normal    Musculoskeletal: Normal range of motion  Neurological: She is alert and oriented to person, place, and time  She has normal reflexes  Skin: Skin is warm and dry  Psychiatric: Her speech is normal and behavior is normal  Judgment and thought content normal  Her mood appears anxious   Cognition and memory are normal

## 2019-04-02 ENCOUNTER — HOSPITAL ENCOUNTER (OUTPATIENT)
Dept: NON INVASIVE DIAGNOSTICS | Facility: HOSPITAL | Age: 44
Discharge: HOME | End: 2019-04-02
Payer: COMMERCIAL

## 2019-04-02 VITALS
SYSTOLIC BLOOD PRESSURE: 116 MMHG | HEIGHT: 68 IN | DIASTOLIC BLOOD PRESSURE: 82 MMHG | WEIGHT: 282 LBS | BODY MASS INDEX: 42.74 KG/M2

## 2019-04-02 DIAGNOSIS — R07.89 CHEST TIGHTNESS: ICD-10-CM

## 2019-04-02 DIAGNOSIS — R06.02 SOB (SHORTNESS OF BREATH): ICD-10-CM

## 2019-04-02 PROCEDURE — 93351 STRESS TTE COMPLETE: CPT | Performed by: INTERNAL MEDICINE

## 2019-04-02 PROCEDURE — 93350 STRESS TTE ONLY: CPT

## 2019-04-02 RX ORDER — DOBUTAMINE HYDROCHLORIDE 200 MG/100ML
10 INJECTION INTRAVENOUS CONTINUOUS
Status: DISCONTINUED | OUTPATIENT
Start: 2019-04-02 | End: 2019-04-06 | Stop reason: HOSPADM

## 2019-04-02 RX ADMIN — DOBUTAMINE HYDROCHLORIDE 20 MCG/KG/MIN: 200 INJECTION INTRAVENOUS at 09:21

## 2019-04-02 RX ADMIN — DOBUTAMINE HYDROCHLORIDE 30 MCG/KG/MIN: 200 INJECTION INTRAVENOUS at 09:24

## 2019-04-02 RX ADMIN — DOBUTAMINE HYDROCHLORIDE 10 MCG/KG/MIN: 200 INJECTION INTRAVENOUS at 09:18

## 2019-04-03 LAB
CHEST PAIN STATEMENT: NORMAL
MAX DIASTOLIC BP: 73 MMHG
MAX HEART RATE: 153 BPM
MAX PREDICTED HEART RATE: 176 BPM
MAX. SYSTOLIC BP: 149 MMHG
PROTOCOL NAME: NORMAL
REASON FOR TERMINATION: NORMAL
TARGET HR FORMULA: NORMAL
TIME IN EXERCISE PHASE: NORMAL

## 2019-05-22 ENCOUNTER — OFFICE VISIT (OUTPATIENT)
Dept: CARDIOLOGY CLINIC | Facility: CLINIC | Age: 44
End: 2019-05-22
Payer: COMMERCIAL

## 2019-05-22 VITALS
WEIGHT: 274 LBS | BODY MASS INDEX: 41.52 KG/M2 | SYSTOLIC BLOOD PRESSURE: 130 MMHG | DIASTOLIC BLOOD PRESSURE: 80 MMHG | HEIGHT: 68 IN | HEART RATE: 113 BPM | OXYGEN SATURATION: 95 %

## 2019-05-22 DIAGNOSIS — E66.9 OBESITY: ICD-10-CM

## 2019-05-22 DIAGNOSIS — R06.00 DOE (DYSPNEA ON EXERTION): ICD-10-CM

## 2019-05-22 DIAGNOSIS — E78.5 DYSLIPIDEMIA: Primary | ICD-10-CM

## 2019-05-22 DIAGNOSIS — G47.33 OBSTRUCTIVE SLEEP APNEA: ICD-10-CM

## 2019-05-22 PROBLEM — R06.09 DOE (DYSPNEA ON EXERTION): Status: ACTIVE | Noted: 2019-05-22

## 2019-05-22 PROCEDURE — 99205 OFFICE O/P NEW HI 60 MIN: CPT | Performed by: INTERNAL MEDICINE

## 2019-05-22 PROCEDURE — 93000 ELECTROCARDIOGRAM COMPLETE: CPT | Performed by: INTERNAL MEDICINE

## 2019-07-01 ENCOUNTER — HOSPITAL ENCOUNTER (OUTPATIENT)
Dept: NON INVASIVE DIAGNOSTICS | Facility: CLINIC | Age: 44
Discharge: HOME | End: 2019-07-01
Payer: COMMERCIAL

## 2019-07-01 ENCOUNTER — TRANSCRIBE ORDERS (OUTPATIENT)
Dept: LAB | Facility: CLINIC | Age: 44
End: 2019-07-01

## 2019-07-01 ENCOUNTER — APPOINTMENT (OUTPATIENT)
Dept: LAB | Facility: CLINIC | Age: 44
End: 2019-07-01
Payer: COMMERCIAL

## 2019-07-01 DIAGNOSIS — R06.00 DOE (DYSPNEA ON EXERTION): ICD-10-CM

## 2019-07-01 DIAGNOSIS — G47.30 SLEEP APNEA, UNSPECIFIED TYPE: Primary | ICD-10-CM

## 2019-07-01 DIAGNOSIS — G47.30 SLEEP APNEA, UNSPECIFIED TYPE: ICD-10-CM

## 2019-07-01 DIAGNOSIS — G47.33 OBSTRUCTIVE SLEEP APNEA: ICD-10-CM

## 2019-07-01 DIAGNOSIS — R07.89 CHEST TIGHTNESS: ICD-10-CM

## 2019-07-01 DIAGNOSIS — R06.02 SOB (SHORTNESS OF BREATH): ICD-10-CM

## 2019-07-01 LAB
ALBUMIN SERPL BCP-MCNC: 3.6 G/DL (ref 3.5–5)
ALP SERPL-CCNC: 60 U/L (ref 46–116)
ALT SERPL W P-5'-P-CCNC: 22 U/L (ref 12–78)
ANION GAP SERPL CALCULATED.3IONS-SCNC: 6 MMOL/L (ref 4–13)
AST SERPL W P-5'-P-CCNC: 9 U/L (ref 5–45)
BASOPHILS # BLD AUTO: 0.03 THOUSANDS/ΜL (ref 0–0.1)
BASOPHILS NFR BLD AUTO: 1 % (ref 0–1)
BILIRUB SERPL-MCNC: 0.41 MG/DL (ref 0.2–1)
BILIRUB UR QL STRIP: NEGATIVE
BUN SERPL-MCNC: 16 MG/DL (ref 5–25)
CALCIUM SERPL-MCNC: 9.1 MG/DL (ref 8.3–10.1)
CHLORIDE SERPL-SCNC: 107 MMOL/L (ref 100–108)
CLARITY UR: NORMAL
CO2 SERPL-SCNC: 26 MMOL/L (ref 21–32)
COLOR UR: YELLOW
CREAT SERPL-MCNC: 0.62 MG/DL (ref 0.6–1.3)
DEPRECATED D DIMER PPP: 416 NG/ML (FEU)
EOSINOPHIL # BLD AUTO: 0.2 THOUSAND/ΜL (ref 0–0.61)
EOSINOPHIL NFR BLD AUTO: 4 % (ref 0–6)
ERYTHROCYTE [DISTWIDTH] IN BLOOD BY AUTOMATED COUNT: 13.6 % (ref 11.6–15.1)
GFR SERPL CREATININE-BSD FRML MDRD: 109 ML/MIN/1.73SQ M
GLUCOSE P FAST SERPL-MCNC: 105 MG/DL (ref 65–99)
GLUCOSE UR STRIP-MCNC: NEGATIVE MG/DL
HCT VFR BLD AUTO: 39.7 % (ref 34.8–46.1)
HGB BLD-MCNC: 12.7 G/DL (ref 11.5–15.4)
HGB UR QL STRIP.AUTO: NEGATIVE
IMM GRANULOCYTES # BLD AUTO: 0.02 THOUSAND/UL (ref 0–0.2)
IMM GRANULOCYTES NFR BLD AUTO: 0 % (ref 0–2)
KETONES UR STRIP-MCNC: NEGATIVE MG/DL
LEUKOCYTE ESTERASE UR QL STRIP: NEGATIVE
LYMPHOCYTES # BLD AUTO: 1.51 THOUSANDS/ΜL (ref 0.6–4.47)
LYMPHOCYTES NFR BLD AUTO: 28 % (ref 14–44)
MCH RBC QN AUTO: 29 PG (ref 26.8–34.3)
MCHC RBC AUTO-ENTMCNC: 32 G/DL (ref 31.4–37.4)
MCV RBC AUTO: 91 FL (ref 82–98)
MONOCYTES # BLD AUTO: 0.41 THOUSAND/ΜL (ref 0.17–1.22)
MONOCYTES NFR BLD AUTO: 8 % (ref 4–12)
NEUTROPHILS # BLD AUTO: 3.28 THOUSANDS/ΜL (ref 1.85–7.62)
NEUTS SEG NFR BLD AUTO: 59 % (ref 43–75)
NITRITE UR QL STRIP: NEGATIVE
NRBC BLD AUTO-RTO: 0 /100 WBCS
PH UR STRIP.AUTO: 6 [PH]
PLATELET # BLD AUTO: 343 THOUSANDS/UL (ref 149–390)
PMV BLD AUTO: 11.3 FL (ref 8.9–12.7)
POTASSIUM SERPL-SCNC: 3.8 MMOL/L (ref 3.5–5.3)
PROT SERPL-MCNC: 7.2 G/DL (ref 6.4–8.2)
PROT UR STRIP-MCNC: NEGATIVE MG/DL
RBC # BLD AUTO: 4.38 MILLION/UL (ref 3.81–5.12)
SODIUM SERPL-SCNC: 139 MMOL/L (ref 136–145)
SP GR UR STRIP.AUTO: 1.02 (ref 1–1.03)
T4 FREE SERPL-MCNC: 0.86 NG/DL (ref 0.76–1.46)
TSH SERPL DL<=0.05 MIU/L-ACNC: 1.23 UIU/ML (ref 0.36–3.74)
UROBILINOGEN UR QL STRIP.AUTO: 1 E.U./DL
WBC # BLD AUTO: 5.45 THOUSAND/UL (ref 4.31–10.16)

## 2019-07-01 PROCEDURE — 36415 COLL VENOUS BLD VENIPUNCTURE: CPT

## 2019-07-01 PROCEDURE — 81003 URINALYSIS AUTO W/O SCOPE: CPT | Performed by: PHYSICIAN ASSISTANT

## 2019-07-01 PROCEDURE — 85379 FIBRIN DEGRADATION QUANT: CPT

## 2019-07-01 PROCEDURE — 93306 TTE W/DOPPLER COMPLETE: CPT | Performed by: INTERNAL MEDICINE

## 2019-07-01 PROCEDURE — 93306 TTE W/DOPPLER COMPLETE: CPT

## 2019-07-01 PROCEDURE — 80053 COMPREHEN METABOLIC PANEL: CPT

## 2019-07-01 PROCEDURE — 85025 COMPLETE CBC W/AUTO DIFF WBC: CPT

## 2019-07-01 PROCEDURE — 84439 ASSAY OF FREE THYROXINE: CPT

## 2019-07-01 PROCEDURE — 84443 ASSAY THYROID STIM HORMONE: CPT

## 2019-09-10 ENCOUNTER — OFFICE VISIT (OUTPATIENT)
Dept: INTERNAL MEDICINE CLINIC | Facility: CLINIC | Age: 44
End: 2019-09-10
Payer: COMMERCIAL

## 2019-09-10 ENCOUNTER — APPOINTMENT (OUTPATIENT)
Dept: LAB | Facility: CLINIC | Age: 44
End: 2019-09-10
Payer: COMMERCIAL

## 2019-09-10 VITALS
HEIGHT: 68 IN | HEART RATE: 92 BPM | DIASTOLIC BLOOD PRESSURE: 90 MMHG | BODY MASS INDEX: 41.34 KG/M2 | RESPIRATION RATE: 20 BRPM | SYSTOLIC BLOOD PRESSURE: 120 MMHG | OXYGEN SATURATION: 95 % | WEIGHT: 272.8 LBS

## 2019-09-10 DIAGNOSIS — Z23 ENCOUNTER FOR IMMUNIZATION: ICD-10-CM

## 2019-09-10 DIAGNOSIS — E55.9 VITAMIN D DEFICIENCY: ICD-10-CM

## 2019-09-10 DIAGNOSIS — Z12.39 SCREENING FOR BREAST CANCER: Primary | ICD-10-CM

## 2019-09-10 DIAGNOSIS — J32.9 SINUSITIS, UNSPECIFIED CHRONICITY, UNSPECIFIED LOCATION: ICD-10-CM

## 2019-09-10 DIAGNOSIS — E78.5 DYSLIPIDEMIA: ICD-10-CM

## 2019-09-10 DIAGNOSIS — R73.01 IMPAIRED FASTING BLOOD SUGAR: ICD-10-CM

## 2019-09-10 LAB
25(OH)D3 SERPL-MCNC: 24.8 NG/ML (ref 30–100)
ALBUMIN SERPL BCP-MCNC: 3.9 G/DL (ref 3.5–5)
ALP SERPL-CCNC: 84 U/L (ref 46–116)
ALT SERPL W P-5'-P-CCNC: 41 U/L (ref 12–78)
ANION GAP SERPL CALCULATED.3IONS-SCNC: 6 MMOL/L (ref 4–13)
AST SERPL W P-5'-P-CCNC: 23 U/L (ref 5–45)
BASOPHILS # BLD AUTO: 0.03 THOUSANDS/ΜL (ref 0–0.1)
BASOPHILS NFR BLD AUTO: 0 % (ref 0–1)
BILIRUB SERPL-MCNC: 0.23 MG/DL (ref 0.2–1)
BUN SERPL-MCNC: 19 MG/DL (ref 5–25)
CALCIUM SERPL-MCNC: 10 MG/DL (ref 8.3–10.1)
CHLORIDE SERPL-SCNC: 108 MMOL/L (ref 100–108)
CHOLEST SERPL-MCNC: 162 MG/DL (ref 50–200)
CO2 SERPL-SCNC: 23 MMOL/L (ref 21–32)
CREAT SERPL-MCNC: 0.72 MG/DL (ref 0.6–1.3)
EOSINOPHIL # BLD AUTO: 0.27 THOUSAND/ΜL (ref 0–0.61)
EOSINOPHIL NFR BLD AUTO: 4 % (ref 0–6)
ERYTHROCYTE [DISTWIDTH] IN BLOOD BY AUTOMATED COUNT: 14.6 % (ref 11.6–15.1)
EST. AVERAGE GLUCOSE BLD GHB EST-MCNC: 117 MG/DL
GFR SERPL CREATININE-BSD FRML MDRD: 101 ML/MIN/1.73SQ M
GLUCOSE SERPL-MCNC: 98 MG/DL (ref 65–140)
HBA1C MFR BLD: 5.7 % (ref 4.2–6.3)
HCT VFR BLD AUTO: 40 % (ref 34.8–46.1)
HDLC SERPL-MCNC: 42 MG/DL (ref 40–60)
HGB BLD-MCNC: 13 G/DL (ref 11.5–15.4)
IMM GRANULOCYTES # BLD AUTO: 0.02 THOUSAND/UL (ref 0–0.2)
IMM GRANULOCYTES NFR BLD AUTO: 0 % (ref 0–2)
LDLC SERPL CALC-MCNC: 93 MG/DL (ref 0–100)
LYMPHOCYTES # BLD AUTO: 2.05 THOUSANDS/ΜL (ref 0.6–4.47)
LYMPHOCYTES NFR BLD AUTO: 30 % (ref 14–44)
MCH RBC QN AUTO: 29.3 PG (ref 26.8–34.3)
MCHC RBC AUTO-ENTMCNC: 32.5 G/DL (ref 31.4–37.4)
MCV RBC AUTO: 90 FL (ref 82–98)
MONOCYTES # BLD AUTO: 0.63 THOUSAND/ΜL (ref 0.17–1.22)
MONOCYTES NFR BLD AUTO: 9 % (ref 4–12)
NEUTROPHILS # BLD AUTO: 3.76 THOUSANDS/ΜL (ref 1.85–7.62)
NEUTS SEG NFR BLD AUTO: 57 % (ref 43–75)
NONHDLC SERPL-MCNC: 120 MG/DL
NRBC BLD AUTO-RTO: 0 /100 WBCS
PLATELET # BLD AUTO: 349 THOUSANDS/UL (ref 149–390)
PMV BLD AUTO: 11.6 FL (ref 8.9–12.7)
POTASSIUM SERPL-SCNC: 3.8 MMOL/L (ref 3.5–5.3)
PROT SERPL-MCNC: 7.9 G/DL (ref 6.4–8.2)
RBC # BLD AUTO: 4.43 MILLION/UL (ref 3.81–5.12)
SODIUM SERPL-SCNC: 137 MMOL/L (ref 136–145)
TRIGL SERPL-MCNC: 133 MG/DL
TSH SERPL DL<=0.05 MIU/L-ACNC: 1.47 UIU/ML (ref 0.36–3.74)
WBC # BLD AUTO: 6.76 THOUSAND/UL (ref 4.31–10.16)

## 2019-09-10 PROCEDURE — 80061 LIPID PANEL: CPT

## 2019-09-10 PROCEDURE — 99213 OFFICE O/P EST LOW 20 MIN: CPT | Performed by: NURSE PRACTITIONER

## 2019-09-10 PROCEDURE — 80053 COMPREHEN METABOLIC PANEL: CPT

## 2019-09-10 PROCEDURE — 36415 COLL VENOUS BLD VENIPUNCTURE: CPT

## 2019-09-10 PROCEDURE — 84443 ASSAY THYROID STIM HORMONE: CPT

## 2019-09-10 PROCEDURE — 83036 HEMOGLOBIN GLYCOSYLATED A1C: CPT

## 2019-09-10 PROCEDURE — 85025 COMPLETE CBC W/AUTO DIFF WBC: CPT

## 2019-09-10 PROCEDURE — 82306 VITAMIN D 25 HYDROXY: CPT

## 2019-09-10 RX ORDER — AMOXICILLIN AND CLAVULANATE POTASSIUM 875; 125 MG/1; MG/1
1 TABLET, FILM COATED ORAL EVERY 12 HOURS SCHEDULED
Qty: 20 TABLET | Refills: 0 | Status: SHIPPED | OUTPATIENT
Start: 2019-09-10 | End: 2019-09-20

## 2019-09-10 NOTE — PATIENT INSTRUCTIONS
Sinusitis, will treat with Augmentin, recommend probiotics  Recommend nasal irrigation followed by Flonase once antibiotic is completed  Recommend Claritin daily    If symptoms are not improving contact me     check routine labs follow-up any abnormal

## 2019-09-10 NOTE — PROGRESS NOTES
Assessment/Plan:    Patient Instructions     Sinusitis, will treat with Augmentin, recommend probiotics  Recommend nasal irrigation followed by Flonase once antibiotic is completed  Recommend Claritin daily  If symptoms are not improving contact me     check routine labs follow-up any abnormal         Diagnoses and all orders for this visit:    Screening for breast cancer  -     Mammo screening bilateral w 3d & cad; Future    Sinusitis, unspecified chronicity, unspecified location  -     amoxicillin-clavulanate (AUGMENTIN) 875-125 mg per tablet; Take 1 tablet by mouth every 12 (twelve) hours for 10 days    Impaired fasting blood sugar  -     CBC and differential; Future  -     Comprehensive metabolic panel; Future  -     Hemoglobin A1C; Future    Vitamin D deficiency  -     Vitamin D 25 hydroxy; Future    Encounter for immunization  -     Lipid panel; Future    Dyslipidemia  -     TSH, 3rd generation with Free T4 reflex; Future         Subjective:      Patient ID: Laurita Lowery is a 38 y o  female     Patient has not felt well for over a month  At 1st she thought it was just a virus because she was taking care of her granddaughter and her granddaughter had fever  It was sore throat and sinus congestion  Then they went on vacation and she felt like she got a little bit worse  But she discontinued take over-the-counter medication  Then the returned home from [place] and she thought maybe she was feeling a little bit better until this last week when her symptoms just came back full force  She has sinus congestion she is blowing green mucus  She has ear pain  She feels exhausted  She has cough  No wheeze or shortness of breath  She is taking sinus medicine over-the-counter she is taking nasal sprays as well    She is just not feeling well        Current Outpatient Medications:     ALPRAZolam (XANAX) 1 mg tablet, Take 1 mg by mouth daily as needed for anxiety , Disp: , Rfl: 0    traZODone (DESYREL) 100 mg tablet, Take 2 tabs (Patient taking differently: Take 2 tabs), Disp: 60 tablet, Rfl: 0    VYVANSE 30 MG capsule, Take 30 mg by mouth daily , Disp: , Rfl: 0    amoxicillin-clavulanate (AUGMENTIN) 875-125 mg per tablet, Take 1 tablet by mouth every 12 (twelve) hours for 10 days, Disp: 20 tablet, Rfl: 0    ergocalciferol (VITAMIN D2) 50,000 units, Take 50,000 Units by mouth once a week, Disp: , Rfl: 0    TRINTELLIX 10 MG tablet, Take 2 tabs daily (Patient not taking: Reported on 9/10/2019), Disp: 60 tablet, Rfl: 0    No results found for this or any previous visit (from the past 1008 hour(s))  The following portions of the patient's history were reviewed and updated as appropriate: allergies, current medications, past family history, past medical history, past social history, past surgical history and problem list      Review of Systems   Constitutional: Negative for appetite change, chills, diaphoresis, fatigue, fever and unexpected weight change  HENT: Positive for postnasal drip, sinus pain, sneezing, sore throat and trouble swallowing  Eyes: Negative for visual disturbance  Respiratory: Positive for cough  Negative for chest tightness and shortness of breath  Cardiovascular: Negative for chest pain, palpitations and leg swelling  Gastrointestinal: Negative for abdominal pain and blood in stool  Endocrine: Negative for cold intolerance, heat intolerance, polydipsia, polyphagia and polyuria  Genitourinary: Negative for difficulty urinating, dysuria, frequency and urgency  Musculoskeletal: Negative for arthralgias and myalgias  Skin: Negative for rash and wound  Neurological: Negative for dizziness, weakness, light-headedness and headaches  Hematological: Negative for adenopathy  Psychiatric/Behavioral: Negative for confusion, dysphoric mood and sleep disturbance  The patient is not nervous/anxious            Objective:      /90 (BP Location: Left arm, Patient Position: Sitting, Cuff Size: Large)   Pulse 92   Resp 20   Ht 5' 8" (1 727 m)   Wt 124 kg (272 lb 12 8 oz)   SpO2 95%   BMI 41 48 kg/m²        Physical Exam   Constitutional: She is oriented to person, place, and time  She appears well-developed  No distress  HENT:   Head: Normocephalic and atraumatic  Nose: Nose normal    Mouth/Throat: Oropharynx is clear and moist     TMs are bulging without effusion, cobblestoning noted to posterior oropharynx, bilateral nares are erythematous edematous right with thick amount of yellow/ green drainage   Eyes: Pupils are equal, round, and reactive to light  Conjunctivae and EOM are normal    Neck: Normal range of motion  Neck supple  No JVD present  No tracheal deviation present  No thyromegaly present  Cardiovascular: Normal rate, regular rhythm, normal heart sounds and intact distal pulses  Exam reveals no gallop and no friction rub  No murmur heard  Pulmonary/Chest: Effort normal and breath sounds normal  No respiratory distress  She has no wheezes  She has no rales  Abdominal: Soft  Bowel sounds are normal  She exhibits no distension  There is no tenderness  Musculoskeletal: Normal range of motion  She exhibits no edema  Lymphadenopathy:     She has no cervical adenopathy  Neurological: She is alert and oriented to person, place, and time  No cranial nerve deficit  Skin: Skin is warm and dry  No rash noted  She is not diaphoretic  Psychiatric: She has a normal mood and affect  Her behavior is normal  Judgment and thought content normal    BMI Counseling: Body mass index is 41 48 kg/m²  The BMI is above normal  Nutrition recommendations include decreasing portion sizes, moderation in carbohydrate intake and increasing intake of lean protein  Exercise recommendations include moderate physical activity 150 minutes/week  No pharmacotherapy was ordered

## 2019-09-12 ENCOUNTER — TELEPHONE (OUTPATIENT)
Dept: INTERNAL MEDICINE CLINIC | Facility: CLINIC | Age: 44
End: 2019-09-12

## 2019-09-12 NOTE — TELEPHONE ENCOUNTER
----- Message from Christiano Garay, [address] sent at 9/12/2019  1:12 PM EDT -----  Her labs are all good except vit d was  alittle low would recommend over the counter vitamin d 2000 units daily

## 2019-11-01 ENCOUNTER — OFFICE VISIT (OUTPATIENT)
Dept: INTERNAL MEDICINE CLINIC | Facility: CLINIC | Age: 44
End: 2019-11-01
Payer: COMMERCIAL

## 2019-11-01 VITALS
RESPIRATION RATE: 20 BRPM | SYSTOLIC BLOOD PRESSURE: 126 MMHG | BODY MASS INDEX: 40.68 KG/M2 | DIASTOLIC BLOOD PRESSURE: 84 MMHG | HEIGHT: 68 IN | WEIGHT: 268.4 LBS | HEART RATE: 106 BPM

## 2019-11-01 DIAGNOSIS — L03.90 CELLULITIS, UNSPECIFIED CELLULITIS SITE: ICD-10-CM

## 2019-11-01 DIAGNOSIS — Z12.39 SCREENING FOR BREAST CANCER: Primary | ICD-10-CM

## 2019-11-01 PROCEDURE — 99213 OFFICE O/P EST LOW 20 MIN: CPT | Performed by: NURSE PRACTITIONER

## 2019-11-01 PROCEDURE — 3008F BODY MASS INDEX DOCD: CPT | Performed by: NURSE PRACTITIONER

## 2019-11-01 RX ORDER — DOXYCYCLINE HYCLATE 100 MG
100 TABLET ORAL 2 TIMES DAILY
Qty: 20 TABLET | Refills: 0 | Status: SHIPPED | OUTPATIENT
Start: 2019-11-01 | End: 2019-11-11

## 2019-11-01 RX ORDER — DULOXETIN HYDROCHLORIDE 30 MG/1
60 CAPSULE, DELAYED RELEASE ORAL DAILY
Refills: 0 | COMMUNITY
Start: 2019-10-14 | End: 2020-03-10

## 2019-11-01 NOTE — PROGRESS NOTES
Assessment/Plan:    Patient Instructions    Cellulitis to left posterior thigh the firm area measures approximately 5 a 0 5 cm by 5 cm I recommend warm compresses hot showers will start doxycycline could there is family history of MR MONREAL twice a day for 10 10 days she should follow up in 10 days if symptoms are not resolved contact me for any worsening of symptoms         Diagnoses and all orders for this visit:    Screening for breast cancer  -     Mammo screening bilateral w 3d & cad; Future    Cellulitis, unspecified cellulitis site  -     doxycycline hyclate (VIBRA-TABS) 100 mg tablet; Take 1 tablet (100 mg total) by mouth 2 (two) times a day for 10 days    Other orders  -     DULoxetine (CYMBALTA) 30 mg delayed release capsule; Take 60 mg by mouth daily         Subjective:      Patient ID: Austin Caballero is a 38 y o  female     Under Monday patient felt just discomfort to the back of the thigh/ buttock region she thought it was just irritation from pants then Tuesday into Wednesday she realized it was actually forming into something, yesterday when she finally looked at it in the mirror it was a big large infected area  She was not able to get an appointment yesterday she was not sure she should go to the ER  Last night into this morning she did warm compresses she feels like today it is better there is no known trauma  No bug bites that she is aware of          Current Outpatient Medications:     ALPRAZolam (XANAX) 1 mg tablet, Take 1 mg by mouth daily as needed for anxiety , Disp: , Rfl: 0    DULoxetine (CYMBALTA) 30 mg delayed release capsule, Take 60 mg by mouth daily, Disp: , Rfl: 0    ergocalciferol (VITAMIN D2) 50,000 units, Take 50,000 Units by mouth once a week, Disp: , Rfl: 0    traZODone (DESYREL) 100 mg tablet, Take 2 tabs (Patient taking differently: Take 2 tabs), Disp: 60 tablet, Rfl: 0    VYVANSE 30 MG capsule, Take 40 mg by mouth as needed , Disp: , Rfl: 0    doxycycline hyclate (VIBRA-TABS) 100 mg tablet, Take 1 tablet (100 mg total) by mouth 2 (two) times a day for 10 days, Disp: 20 tablet, Rfl: 0    No results found for this or any previous visit (from the past 1008 hour(s))  The following portions of the patient's history were reviewed and updated as appropriate: allergies, current medications, past family history, past medical history, past social history, past surgical history and problem list      Review of Systems   Constitutional: Negative for appetite change, chills, diaphoresis, fatigue, fever and unexpected weight change  HENT: Negative for postnasal drip and sneezing  Eyes: Negative for visual disturbance  Respiratory: Negative for chest tightness and shortness of breath  Cardiovascular: Negative for chest pain, palpitations and leg swelling  Gastrointestinal: Negative for abdominal pain and blood in stool  Endocrine: Negative for cold intolerance, heat intolerance, polydipsia, polyphagia and polyuria  Genitourinary: Negative for difficulty urinating, dysuria, frequency and urgency  Musculoskeletal: Negative for arthralgias and myalgias  Skin: Negative for rash and wound  Infection to left upper posterior thigh   Neurological: Negative for dizziness, weakness, light-headedness and headaches  Hematological: Negative for adenopathy  Psychiatric/Behavioral: Negative for confusion, dysphoric mood and sleep disturbance  The patient is not nervous/anxious  Objective:      /84 (BP Location: Left arm, Patient Position: Sitting, Cuff Size: Standard)   Pulse (!) 106   Resp 20   Ht 5' 8" (1 727 m)   Wt 122 kg (268 lb 6 4 oz)   BMI 40 81 kg/m²        Physical Exam   Constitutional: She is oriented to person, place, and time  She appears well-developed  No distress  HENT:   Head: Normocephalic and atraumatic  Nose: Nose normal    Mouth/Throat: Oropharynx is clear and moist    Eyes: Pupils are equal, round, and reactive to light  Conjunctivae and EOM are normal    Neck: Normal range of motion  Neck supple  No JVD present  No tracheal deviation present  No thyromegaly present  Cardiovascular: Normal rate, regular rhythm, normal heart sounds and intact distal pulses  Exam reveals no gallop and no friction rub  No murmur heard  Pulmonary/Chest: Effort normal and breath sounds normal  No respiratory distress  She has no wheezes  She has no rales  Abdominal: Soft  Bowel sounds are normal  She exhibits no distension  There is no tenderness  Musculoskeletal: Normal range of motion  She exhibits no edema  Lymphadenopathy:     She has no cervical adenopathy  Neurological: She is alert and oriented to person, place, and time  No cranial nerve deficit  Skin: Skin is warm and dry  No rash noted  She is not diaphoretic  Erythematous warm area that measures approximately 5-1/2 by 5 cm to the left upper posterior thigh /gluteal fold region   Psychiatric: She has a normal mood and affect   Her behavior is normal  Judgment and thought content normal

## 2019-11-01 NOTE — PATIENT INSTRUCTIONS
Cellulitis to left posterior thigh the firm area measures approximately 5 a 0 5 cm by 5 cm I recommend warm compresses hot showers will start doxycycline could there is family history of MR  twice a day for 10 10 days she should follow up in 10 days if symptoms are not resolved contact me for any worsening of symptoms

## 2020-03-10 ENCOUNTER — OFFICE VISIT (OUTPATIENT)
Dept: INTERNAL MEDICINE CLINIC | Facility: CLINIC | Age: 45
End: 2020-03-10
Payer: COMMERCIAL

## 2020-03-10 VITALS
OXYGEN SATURATION: 97 % | BODY MASS INDEX: 41.62 KG/M2 | SYSTOLIC BLOOD PRESSURE: 144 MMHG | DIASTOLIC BLOOD PRESSURE: 90 MMHG | WEIGHT: 274.6 LBS | TEMPERATURE: 98.2 F | HEART RATE: 103 BPM | HEIGHT: 68 IN

## 2020-03-10 DIAGNOSIS — R68.84 JAW PAIN: Primary | ICD-10-CM

## 2020-03-10 PROCEDURE — 99213 OFFICE O/P EST LOW 20 MIN: CPT | Performed by: INTERNAL MEDICINE

## 2020-03-10 PROCEDURE — 1036F TOBACCO NON-USER: CPT | Performed by: INTERNAL MEDICINE

## 2020-03-10 PROCEDURE — 3008F BODY MASS INDEX DOCD: CPT | Performed by: INTERNAL MEDICINE

## 2020-03-10 NOTE — PROGRESS NOTES
Assessment/Plan:    Diagnoses and all orders for this visit:    Jaw pain         Patient Instructions   Jaw pain is of unclear etiology, question dental verses parotiditis verses TMJ versus other  At this point I have recommended following through with seeing the endodontist   Continue with ibuprofen 800 mg every 8 hours with food  Use warm compresses, consider salt water gargles and lemon drops and or lemon wedges to induce salivation  Contact me if any swelling, redness, warmth or tenderness emanating from the parotid gland which would prompt a prescription for Augmentin  Follow-up as needed  Subjective:      Patient ID: Leobardo Bhatt is a 38 y o  female    Patient presents for evaluation of acute pain in the upper molar on the right side  Pain has been present for approximately 10 days  She held out until today to see her dentist who did a examination including x-rays and could not find anything problematic though when he did tap on the tooth and blow air at the tooth it increase the sensitivity so she was referred to an endodontist   Notably the pain is in a tooth where she had previous root canal   She also notes that the pain is somewhat referred into the upper cheek and lower jaw and seems to radiate back into the right ear as well  Notably on the x-ray there was no mention of sinusitis  She feels warm but does not have fever  She notes that her child is sick in the home with fever  She denies any significant runny nose, sinus congestion or postnasal drip  She does have menstrual migraine and is experiencing her typical monthly headache at this time as well  Current Outpatient Medications:     ALPRAZolam (XANAX) 1 mg tablet, Take 1 mg by mouth daily as needed for anxiety , Disp: , Rfl: 0    VYVANSE 30 MG capsule, Take 40 mg by mouth as needed , Disp: , Rfl: 0    No results found for this or any previous visit (from the past 1008 hour(s))      The following portions of the patient's history were reviewed and updated as appropriate: allergies, current medications, past family history, past medical history, past social history, past surgical history and problem list      Review of Systems   Constitutional: Negative for appetite change, chills, diaphoresis, fatigue, fever and unexpected weight change  HENT: Negative for congestion, hearing loss and rhinorrhea  Eyes: Negative for visual disturbance  Respiratory: Negative for cough, chest tightness, shortness of breath and wheezing  Cardiovascular: Negative for chest pain, palpitations and leg swelling  Gastrointestinal: Negative for abdominal pain and blood in stool  Endocrine: Negative for cold intolerance, heat intolerance, polydipsia and polyuria  Genitourinary: Negative for difficulty urinating, dysuria, frequency and urgency  Musculoskeletal: Negative for arthralgias and myalgias  Skin: Negative for rash  Neurological: Negative for dizziness, weakness, light-headedness and headaches  Hematological: Does not bruise/bleed easily  Psychiatric/Behavioral: Negative for dysphoric mood and sleep disturbance  Objective:      Vitals:    03/10/20 1644   BP: 144/90   Pulse: 103   Temp: 98 2 °F (36 8 °C)   SpO2: 97%          Physical Exam   Constitutional: She is oriented to person, place, and time  She appears well-developed and well-nourished  HENT:   Head: Normocephalic and atraumatic  Nose: Nose normal    Mouth/Throat: Oropharynx is clear and moist    Minimal tenderness over the right parotid with no appreciable enlargement of the parotid gland   Eyes: Pupils are equal, round, and reactive to light  Conjunctivae and EOM are normal  No scleral icterus  Neck: Normal range of motion  Neck supple  No JVD present  No tracheal deviation present  No thyromegaly present  Cardiovascular: Normal rate, regular rhythm and intact distal pulses  Exam reveals no gallop and no friction rub     No murmur heard   Pulmonary/Chest: Effort normal and breath sounds normal  No respiratory distress  She has no wheezes  She has no rales  Musculoskeletal: She exhibits no edema or deformity  Lymphadenopathy:     She has no cervical adenopathy  Neurological: She is alert and oriented to person, place, and time  No cranial nerve deficit  Skin: Skin is warm and dry  No rash noted  No erythema  No pallor  Psychiatric: She has a normal mood and affect   Her behavior is normal  Judgment and thought content normal

## 2020-03-10 NOTE — PATIENT INSTRUCTIONS
Jaw pain is of unclear etiology, question dental verses parotiditis verses TMJ versus other  At this point I have recommended following through with seeing the endodontist   Continue with ibuprofen 800 mg every 8 hours with food  Use warm compresses, consider salt water gargles and lemon drops and or lemon wedges to induce salivation  Contact me if any swelling, redness, warmth or tenderness emanating from the parotid gland which would prompt a prescription for Augmentin  Follow-up as needed

## 2020-03-13 ENCOUNTER — TELEPHONE (OUTPATIENT)
Dept: INTERNAL MEDICINE CLINIC | Facility: CLINIC | Age: 45
End: 2020-03-13

## 2020-03-13 DIAGNOSIS — R68.84 JAW PAIN: Primary | ICD-10-CM

## 2020-03-13 NOTE — TELEPHONE ENCOUNTER
Spoke with patient  She said she didn't want to get the antibiotic at first was hoping to get into see ENT, she had an appointment 3/18 but they just called & said they are closing their office until the end of April with everything going on  She said her symptoms haven't changed, still in the pain & exhausted living with what's going on  No swelling she noticed, then said maybe on the Right side being more swollen  No improvement in jaw pain  Patient is thinking she wants to try the antibiotic you were going to send in for her  Also wanted to know maybe about a mouth wash to try & help    If not what should she do? Come back in?  Go to the ER?

## 2020-03-13 NOTE — TELEPHONE ENCOUNTER
Dr Óscar Lujan offered augmentin; an antibiotic the other day because of pain in her mouth- infection in her salivary glands  Does he recommend a mouth wash? A script?     Goes to [facility] in [place]    Please call her

## 2020-03-13 NOTE — TELEPHONE ENCOUNTER
How are her symptoms since the visit? Any swelling of the parotid gland? Has the jaw pain improved at all? Anything new?

## 2020-03-14 ENCOUNTER — APPOINTMENT (OUTPATIENT)
Dept: LAB | Facility: CLINIC | Age: 45
End: 2020-03-14
Payer: COMMERCIAL

## 2020-03-14 ENCOUNTER — OFFICE VISIT (OUTPATIENT)
Dept: INTERNAL MEDICINE CLINIC | Facility: CLINIC | Age: 45
End: 2020-03-14
Payer: COMMERCIAL

## 2020-03-14 VITALS
TEMPERATURE: 98.1 F | DIASTOLIC BLOOD PRESSURE: 100 MMHG | HEART RATE: 74 BPM | HEIGHT: 68 IN | BODY MASS INDEX: 41.07 KG/M2 | SYSTOLIC BLOOD PRESSURE: 140 MMHG | WEIGHT: 271 LBS

## 2020-03-14 DIAGNOSIS — R68.84 JAW PAIN: ICD-10-CM

## 2020-03-14 DIAGNOSIS — J01.01 ACUTE RECURRENT MAXILLARY SINUSITIS: Primary | ICD-10-CM

## 2020-03-14 DIAGNOSIS — K05.10 GINGIVITIS: ICD-10-CM

## 2020-03-14 LAB
BASOPHILS # BLD AUTO: 0.03 THOUSANDS/ΜL (ref 0–0.1)
BASOPHILS NFR BLD AUTO: 0 % (ref 0–1)
CRP SERPL QL: <3 MG/L
EOSINOPHIL # BLD AUTO: 0.2 THOUSAND/ΜL (ref 0–0.61)
EOSINOPHIL NFR BLD AUTO: 2 % (ref 0–6)
ERYTHROCYTE [DISTWIDTH] IN BLOOD BY AUTOMATED COUNT: 13.8 % (ref 11.6–15.1)
ERYTHROCYTE [SEDIMENTATION RATE] IN BLOOD: 13 MM/HOUR (ref 0–20)
HCT VFR BLD AUTO: 40 % (ref 34.8–46.1)
HGB BLD-MCNC: 12.5 G/DL (ref 11.5–15.4)
IMM GRANULOCYTES # BLD AUTO: 0.02 THOUSAND/UL (ref 0–0.2)
IMM GRANULOCYTES NFR BLD AUTO: 0 % (ref 0–2)
LYMPHOCYTES # BLD AUTO: 2.35 THOUSANDS/ΜL (ref 0.6–4.47)
LYMPHOCYTES NFR BLD AUTO: 27 % (ref 14–44)
MCH RBC QN AUTO: 28.7 PG (ref 26.8–34.3)
MCHC RBC AUTO-ENTMCNC: 31.3 G/DL (ref 31.4–37.4)
MCV RBC AUTO: 92 FL (ref 82–98)
MONOCYTES # BLD AUTO: 0.67 THOUSAND/ΜL (ref 0.17–1.22)
MONOCYTES NFR BLD AUTO: 8 % (ref 4–12)
NEUTROPHILS # BLD AUTO: 5.4 THOUSANDS/ΜL (ref 1.85–7.62)
NEUTS SEG NFR BLD AUTO: 63 % (ref 43–75)
NRBC BLD AUTO-RTO: 0 /100 WBCS
PLATELET # BLD AUTO: 334 THOUSANDS/UL (ref 149–390)
PMV BLD AUTO: 11.3 FL (ref 8.9–12.7)
RBC # BLD AUTO: 4.36 MILLION/UL (ref 3.81–5.12)
WBC # BLD AUTO: 8.67 THOUSAND/UL (ref 4.31–10.16)

## 2020-03-14 PROCEDURE — 1036F TOBACCO NON-USER: CPT | Performed by: INTERNAL MEDICINE

## 2020-03-14 PROCEDURE — 3008F BODY MASS INDEX DOCD: CPT | Performed by: INTERNAL MEDICINE

## 2020-03-14 PROCEDURE — 86140 C-REACTIVE PROTEIN: CPT

## 2020-03-14 PROCEDURE — 99213 OFFICE O/P EST LOW 20 MIN: CPT | Performed by: INTERNAL MEDICINE

## 2020-03-14 PROCEDURE — 85652 RBC SED RATE AUTOMATED: CPT

## 2020-03-14 PROCEDURE — 85025 COMPLETE CBC W/AUTO DIFF WBC: CPT

## 2020-03-14 PROCEDURE — 36415 COLL VENOUS BLD VENIPUNCTURE: CPT

## 2020-03-14 RX ORDER — IBUPROFEN 800 MG/1
800 TABLET ORAL EVERY 8 HOURS PRN
COMMUNITY
End: 2022-05-20 | Stop reason: SDUPTHER

## 2020-03-14 RX ORDER — CHLORHEXIDINE GLUCONATE 0.12 MG/ML
15 RINSE ORAL 2 TIMES DAILY
Qty: 120 ML | Refills: 0 | Status: SHIPPED | OUTPATIENT
Start: 2020-03-14 | End: 2020-04-16

## 2020-03-14 RX ORDER — AMOXICILLIN AND CLAVULANATE POTASSIUM 875; 125 MG/1; MG/1
1 TABLET, FILM COATED ORAL 2 TIMES DAILY
Qty: 14 TABLET | Refills: 0 | Status: SHIPPED | OUTPATIENT
Start: 2020-03-14 | End: 2020-03-21

## 2020-03-14 NOTE — PATIENT INSTRUCTIONS
Patient has persistent right-sided sinus pain, tooth pain, tenderness to the parotid that has failed a good course of anti-inflammatories  Unclear if there is underlying infection however without being able to get into an endodontist in the next month I would like to treat empirically for sinus versus dental infection with Augmentin  Continue with ibuprofen as needed for pain  Continue with heating pad    Will also prescribed Peridex for rinsing twice daily

## 2020-03-14 NOTE — PROGRESS NOTES
Assessment/Plan:    Diagnoses and all orders for this visit:    Acute recurrent maxillary sinusitis  -     amoxicillin-clavulanate (AUGMENTIN) 875-125 mg per tablet; Take 1 tablet by mouth 2 (two) times a day for 7 days  -     chlorhexidine (PERIDEX) 0 12 % solution; Apply 15 mL to the mouth or throat 2 (two) times a day    Gingivitis  -     chlorhexidine (PERIDEX) 0 12 % solution; Apply 15 mL to the mouth or throat 2 (two) times a day    Other orders  -     ibuprofen (MOTRIN) 800 mg tablet; Take 800 mg by mouth every 8 (eight) hours as needed for mild pain         Patient Instructions   Patient has persistent right-sided sinus pain, tooth pain, tenderness to the parotid that has failed a good course of anti-inflammatories  Unclear if there is underlying infection however without being able to get into an endodontist in the next month I would like to treat empirically for sinus versus dental infection with Augmentin  Continue with ibuprofen as needed for pain  Continue with heating pad  Will also prescribed Peridex for rinsing twice daily      Subjective:      Patient ID: Elio Hall is a 38 y o  female    Patient presents for follow-up of right-sided facial and dental pain  She feels as though there is some numbness over the teeth on the upper front area but definitely some more tenderness to palpation and percussion over the right maxillary sinus  She continues to have severe sensitivity to cold water in her mouth on both upper and lower molars  She continues have some pain that radiates into the right ear  She has been using ibuprofen 800 mg 2-3 times daily for several weeks without improvement  She has not had fevers or chills  She notes that she has increased rhinorrhea over the last 48 hours that is getting thicker  Appointment with endodontist was rescheduled from March 18th until April 24th due to the novel corona virus          Current Outpatient Medications:     ALPRAZolam (XANAX) 1 mg tablet, Take 1 mg by mouth daily as needed for anxiety , Disp: , Rfl: 0    ibuprofen (MOTRIN) 800 mg tablet, Take 800 mg by mouth every 8 (eight) hours as needed for mild pain, Disp: , Rfl:     VYVANSE 30 MG capsule, Take 40 mg by mouth as needed , Disp: , Rfl: 0    amoxicillin-clavulanate (AUGMENTIN) 875-125 mg per tablet, Take 1 tablet by mouth 2 (two) times a day for 7 days, Disp: 14 tablet, Rfl: 0    chlorhexidine (PERIDEX) 0 12 % solution, Apply 15 mL to the mouth or throat 2 (two) times a day, Disp: 120 mL, Rfl: 0    No results found for this or any previous visit (from the past 1008 hour(s))  The following portions of the patient's history were reviewed and updated as appropriate: allergies, current medications, past family history, past medical history, past social history, past surgical history and problem list      Review of Systems   Constitutional: Negative for appetite change, chills, diaphoresis, fatigue, fever and unexpected weight change  HENT: Positive for rhinorrhea  Negative for congestion and hearing loss  Eyes: Negative for visual disturbance  Respiratory: Negative for cough, chest tightness, shortness of breath and wheezing  Cardiovascular: Negative for chest pain, palpitations and leg swelling  Gastrointestinal: Negative for abdominal pain and blood in stool  Endocrine: Negative for cold intolerance, heat intolerance, polydipsia and polyuria  Genitourinary: Negative for difficulty urinating, dysuria, frequency and urgency  Musculoskeletal: Negative for arthralgias and myalgias  Skin: Negative for rash  Neurological: Negative for dizziness, weakness, light-headedness and headaches  Hematological: Does not bruise/bleed easily  Psychiatric/Behavioral: Negative for dysphoric mood and sleep disturbance           Objective:      Vitals:    03/14/20 1140   BP: 140/100   Pulse: 74   Temp: 98 1 °F (36 7 °C)          Physical Exam   Constitutional: She is oriented to person, place, and time  She appears well-developed and well-nourished  HENT:   Head: Normocephalic and atraumatic  Nose: Nose normal    Mouth/Throat: Oropharynx is clear and moist    Tender to palpate over right maxillary sinus  Right parotid feels to be of normal size but is somewhat tender to palpate as well   Eyes: Pupils are equal, round, and reactive to light  Conjunctivae and EOM are normal  No scleral icterus  Neck: Normal range of motion  Neck supple  No JVD present  No tracheal deviation present  No thyromegaly present  Cardiovascular: Normal rate, regular rhythm and intact distal pulses  Exam reveals no gallop and no friction rub  No murmur heard  Pulmonary/Chest: Effort normal and breath sounds normal  No respiratory distress  She has no wheezes  She has no rales  Musculoskeletal: She exhibits no edema or deformity  Lymphadenopathy:     She has no cervical adenopathy  Neurological: She is alert and oriented to person, place, and time  No cranial nerve deficit  Skin: Skin is warm and dry  No rash noted  No erythema  No pallor  Psychiatric: She has a normal mood and affect   Her behavior is normal  Judgment and thought content normal

## 2020-03-16 ENCOUNTER — TELEPHONE (OUTPATIENT)
Dept: INTERNAL MEDICINE CLINIC | Facility: CLINIC | Age: 45
End: 2020-03-16

## 2020-03-16 NOTE — TELEPHONE ENCOUNTER
----- Message from Osbaldo Rahman MD sent at 3/15/2020  9:59 AM EDT -----  Notify-labs are fine, no indication of infection or inflammation

## 2020-03-19 ENCOUNTER — TELEPHONE (OUTPATIENT)
Dept: INTERNAL MEDICINE CLINIC | Facility: CLINIC | Age: 45
End: 2020-03-19

## 2020-03-19 NOTE — TELEPHONE ENCOUNTER
Checked with patient that she is ordering medical supplies through, "[employer]", which she confirmed

## 2020-04-16 ENCOUNTER — TELEMEDICINE (OUTPATIENT)
Dept: INTERNAL MEDICINE CLINIC | Facility: CLINIC | Age: 45
End: 2020-04-16
Payer: COMMERCIAL

## 2020-04-16 VITALS — HEART RATE: 68 BPM | TEMPERATURE: 98 F

## 2020-04-16 DIAGNOSIS — F41.9 ANXIETY: ICD-10-CM

## 2020-04-16 DIAGNOSIS — R00.2 PALPITATIONS: ICD-10-CM

## 2020-04-16 DIAGNOSIS — G47.00 INSOMNIA, UNSPECIFIED TYPE: ICD-10-CM

## 2020-04-16 DIAGNOSIS — E66.09 CLASS 2 OBESITY DUE TO EXCESS CALORIES WITHOUT SERIOUS COMORBIDITY WITH BODY MASS INDEX (BMI) OF 38.0 TO 38.9 IN ADULT: ICD-10-CM

## 2020-04-16 DIAGNOSIS — Z11.4 SCREENING FOR HIV (HUMAN IMMUNODEFICIENCY VIRUS): ICD-10-CM

## 2020-04-16 DIAGNOSIS — F31.81 BIPOLAR II DISORDER (HCC): Primary | ICD-10-CM

## 2020-04-16 DIAGNOSIS — Z11.59 NEED FOR HEPATITIS C SCREENING TEST: ICD-10-CM

## 2020-04-16 PROCEDURE — 99214 OFFICE O/P EST MOD 30 MIN: CPT | Performed by: INTERNAL MEDICINE

## 2020-04-16 RX ORDER — TRAZODONE HYDROCHLORIDE 100 MG/1
100 TABLET ORAL
COMMUNITY

## 2021-03-10 DIAGNOSIS — Z23 ENCOUNTER FOR IMMUNIZATION: ICD-10-CM

## 2021-03-15 ENCOUNTER — IMMUNIZATIONS (OUTPATIENT)
Dept: FAMILY MEDICINE CLINIC | Facility: HOSPITAL | Age: 46
End: 2021-03-15

## 2021-03-15 DIAGNOSIS — Z23 ENCOUNTER FOR IMMUNIZATION: Primary | ICD-10-CM

## 2021-03-15 PROCEDURE — 0001A SARS-COV-2 / COVID-19 MRNA VACCINE (PFIZER-BIONTECH) 30 MCG: CPT

## 2021-03-15 PROCEDURE — 91300 SARS-COV-2 / COVID-19 MRNA VACCINE (PFIZER-BIONTECH) 30 MCG: CPT

## 2021-04-09 ENCOUNTER — IMMUNIZATIONS (OUTPATIENT)
Dept: FAMILY MEDICINE CLINIC | Facility: HOSPITAL | Age: 46
End: 2021-04-09

## 2021-04-09 DIAGNOSIS — Z23 ENCOUNTER FOR IMMUNIZATION: Primary | ICD-10-CM

## 2021-04-09 PROCEDURE — 0002A SARS-COV-2 / COVID-19 MRNA VACCINE (PFIZER-BIONTECH) 30 MCG: CPT

## 2021-04-09 PROCEDURE — 91300 SARS-COV-2 / COVID-19 MRNA VACCINE (PFIZER-BIONTECH) 30 MCG: CPT

## 2021-05-11 ENCOUNTER — TELEPHONE (OUTPATIENT)
Dept: INTERNAL MEDICINE CLINIC | Facility: CLINIC | Age: 46
End: 2021-05-11

## 2021-05-12 ENCOUNTER — HOSPITAL ENCOUNTER (OUTPATIENT)
Dept: MAMMOGRAPHY | Facility: CLINIC | Age: 46
Discharge: HOME | End: 2021-05-12
Payer: COMMERCIAL

## 2021-05-12 VITALS — HEIGHT: 68 IN | WEIGHT: 271 LBS | BODY MASS INDEX: 41.07 KG/M2

## 2021-05-12 DIAGNOSIS — Z00.00 WELL ADULT EXAM: ICD-10-CM

## 2021-05-12 DIAGNOSIS — Z12.39 SCREENING FOR BREAST CANCER: ICD-10-CM

## 2021-05-12 DIAGNOSIS — Z12.31 ENCOUNTER FOR SCREENING MAMMOGRAM FOR MALIGNANT NEOPLASM OF BREAST: Primary | ICD-10-CM

## 2021-05-12 PROCEDURE — 77063 BREAST TOMOSYNTHESIS BI: CPT

## 2021-05-12 PROCEDURE — 77067 SCR MAMMO BI INCL CAD: CPT

## 2021-05-20 ENCOUNTER — APPOINTMENT (OUTPATIENT)
Dept: LAB | Facility: CLINIC | Age: 46
End: 2021-05-20
Payer: COMMERCIAL

## 2021-05-20 ENCOUNTER — OFFICE VISIT (OUTPATIENT)
Dept: INTERNAL MEDICINE CLINIC | Facility: CLINIC | Age: 46
End: 2021-05-20
Payer: COMMERCIAL

## 2021-05-20 VITALS
DIASTOLIC BLOOD PRESSURE: 86 MMHG | BODY MASS INDEX: 38.22 KG/M2 | OXYGEN SATURATION: 97 % | TEMPERATURE: 99.6 F | WEIGHT: 252.2 LBS | HEART RATE: 76 BPM | HEIGHT: 68 IN | SYSTOLIC BLOOD PRESSURE: 144 MMHG

## 2021-05-20 DIAGNOSIS — Z23 ENCOUNTER FOR IMMUNIZATION: ICD-10-CM

## 2021-05-20 DIAGNOSIS — Z11.4 SCREENING FOR HIV (HUMAN IMMUNODEFICIENCY VIRUS): ICD-10-CM

## 2021-05-20 DIAGNOSIS — E55.9 VITAMIN D DEFICIENCY: ICD-10-CM

## 2021-05-20 DIAGNOSIS — R73.01 IFG (IMPAIRED FASTING GLUCOSE): ICD-10-CM

## 2021-05-20 DIAGNOSIS — E53.8 VITAMIN B 12 DEFICIENCY: ICD-10-CM

## 2021-05-20 DIAGNOSIS — E78.5 DYSLIPIDEMIA: Primary | ICD-10-CM

## 2021-05-20 DIAGNOSIS — E78.5 DYSLIPIDEMIA: ICD-10-CM

## 2021-05-20 DIAGNOSIS — Z00.00 PHYSICAL EXAM: ICD-10-CM

## 2021-05-20 LAB
25(OH)D3 SERPL-MCNC: 20.4 NG/ML (ref 30–100)
ALBUMIN SERPL BCP-MCNC: 3.8 G/DL (ref 3.5–5)
ALP SERPL-CCNC: 73 U/L (ref 46–116)
ALT SERPL W P-5'-P-CCNC: 39 U/L (ref 12–78)
ANION GAP SERPL CALCULATED.3IONS-SCNC: 7 MMOL/L (ref 4–13)
AST SERPL W P-5'-P-CCNC: 15 U/L (ref 5–45)
BASOPHILS # BLD AUTO: 0.02 THOUSANDS/ΜL (ref 0–0.1)
BASOPHILS NFR BLD AUTO: 0 % (ref 0–1)
BILIRUB SERPL-MCNC: 0.35 MG/DL (ref 0.2–1)
BUN SERPL-MCNC: 15 MG/DL (ref 5–25)
CALCIUM SERPL-MCNC: 10.4 MG/DL (ref 8.3–10.1)
CHLORIDE SERPL-SCNC: 112 MMOL/L (ref 100–108)
CHOLEST SERPL-MCNC: 175 MG/DL (ref 50–200)
CO2 SERPL-SCNC: 22 MMOL/L (ref 21–32)
CREAT SERPL-MCNC: 0.69 MG/DL (ref 0.6–1.3)
EOSINOPHIL # BLD AUTO: 0.12 THOUSAND/ΜL (ref 0–0.61)
EOSINOPHIL NFR BLD AUTO: 2 % (ref 0–6)
ERYTHROCYTE [DISTWIDTH] IN BLOOD BY AUTOMATED COUNT: 14.2 % (ref 11.6–15.1)
EST. AVERAGE GLUCOSE BLD GHB EST-MCNC: 114 MG/DL
GFR SERPL CREATININE-BSD FRML MDRD: 104 ML/MIN/1.73SQ M
GLUCOSE P FAST SERPL-MCNC: 116 MG/DL (ref 65–99)
HBA1C MFR BLD: 5.6 %
HCT VFR BLD AUTO: 40 % (ref 34.8–46.1)
HDLC SERPL-MCNC: 44 MG/DL
HGB BLD-MCNC: 13.1 G/DL (ref 11.5–15.4)
IMM GRANULOCYTES # BLD AUTO: 0.03 THOUSAND/UL (ref 0–0.2)
IMM GRANULOCYTES NFR BLD AUTO: 0 % (ref 0–2)
LDLC SERPL CALC-MCNC: 101 MG/DL (ref 0–100)
LYMPHOCYTES # BLD AUTO: 1.67 THOUSANDS/ΜL (ref 0.6–4.47)
LYMPHOCYTES NFR BLD AUTO: 24 % (ref 14–44)
MCH RBC QN AUTO: 29.8 PG (ref 26.8–34.3)
MCHC RBC AUTO-ENTMCNC: 32.8 G/DL (ref 31.4–37.4)
MCV RBC AUTO: 91 FL (ref 82–98)
MONOCYTES # BLD AUTO: 0.57 THOUSAND/ΜL (ref 0.17–1.22)
MONOCYTES NFR BLD AUTO: 8 % (ref 4–12)
NEUTROPHILS # BLD AUTO: 4.6 THOUSANDS/ΜL (ref 1.85–7.62)
NEUTS SEG NFR BLD AUTO: 66 % (ref 43–75)
NONHDLC SERPL-MCNC: 131 MG/DL
NRBC BLD AUTO-RTO: 0 /100 WBCS
PLATELET # BLD AUTO: 291 THOUSANDS/UL (ref 149–390)
PMV BLD AUTO: 12.2 FL (ref 8.9–12.7)
POTASSIUM SERPL-SCNC: 3.7 MMOL/L (ref 3.5–5.3)
PROT SERPL-MCNC: 7.3 G/DL (ref 6.4–8.2)
RBC # BLD AUTO: 4.4 MILLION/UL (ref 3.81–5.12)
SODIUM SERPL-SCNC: 141 MMOL/L (ref 136–145)
TRIGL SERPL-MCNC: 149 MG/DL
TSH SERPL DL<=0.05 MIU/L-ACNC: 0.95 UIU/ML (ref 0.36–3.74)
VIT B12 SERPL-MCNC: 282 PG/ML (ref 100–900)
WBC # BLD AUTO: 7.01 THOUSAND/UL (ref 4.31–10.16)

## 2021-05-20 PROCEDURE — 3725F SCREEN DEPRESSION PERFORMED: CPT | Performed by: NURSE PRACTITIONER

## 2021-05-20 PROCEDURE — 3008F BODY MASS INDEX DOCD: CPT | Performed by: NURSE PRACTITIONER

## 2021-05-20 PROCEDURE — 82306 VITAMIN D 25 HYDROXY: CPT

## 2021-05-20 PROCEDURE — 1036F TOBACCO NON-USER: CPT | Performed by: NURSE PRACTITIONER

## 2021-05-20 PROCEDURE — 85025 COMPLETE CBC W/AUTO DIFF WBC: CPT

## 2021-05-20 PROCEDURE — 99396 PREV VISIT EST AGE 40-64: CPT | Performed by: NURSE PRACTITIONER

## 2021-05-20 PROCEDURE — 90471 IMMUNIZATION ADMIN: CPT

## 2021-05-20 PROCEDURE — 80061 LIPID PANEL: CPT

## 2021-05-20 PROCEDURE — 90715 TDAP VACCINE 7 YRS/> IM: CPT

## 2021-05-20 PROCEDURE — 83036 HEMOGLOBIN GLYCOSYLATED A1C: CPT

## 2021-05-20 PROCEDURE — 36415 COLL VENOUS BLD VENIPUNCTURE: CPT

## 2021-05-20 PROCEDURE — 80053 COMPREHEN METABOLIC PANEL: CPT

## 2021-05-20 PROCEDURE — 83970 ASSAY OF PARATHORMONE: CPT

## 2021-05-20 PROCEDURE — 84443 ASSAY THYROID STIM HORMONE: CPT

## 2021-05-20 PROCEDURE — 87389 HIV-1 AG W/HIV-1&-2 AB AG IA: CPT

## 2021-05-20 PROCEDURE — 83918 ORGANIC ACIDS TOTAL QUANT: CPT

## 2021-05-20 PROCEDURE — 82607 VITAMIN B-12: CPT

## 2021-05-20 RX ORDER — TOPIRAMATE 100 MG/1
200 TABLET, FILM COATED ORAL DAILY
COMMUNITY
Start: 2021-05-18

## 2021-05-20 NOTE — PROGRESS NOTES
Assessment/Plan:    Patient Instructions     Generally healthy 69-year-old female, recommend continue with weight loss efforts diet exercise     obstructive sleep apnea stable on CPAP she requires a new machine as her current is broken beyond repair     health maintenance tetanus given otherwise up-to-date     depression anxiety question bipolar following with Psychiatry         Diagnoses and all orders for this visit:    Dyslipidemia  -     CBC and differential; Future  -     Comprehensive metabolic panel; Future  -     Lipid panel; Future  -     TSH, 3rd generation with Free T4 reflex; Future    Physical exam    Screening for HIV (human immunodeficiency virus)  -     HIV 1/2 Antigen/Antibody (4th Generation) w Reflex [facility]; Future    Encounter for immunization  -     TDAP VACCINE GREATER THAN OR EQUAL TO 6YO IM    IFG (impaired fasting glucose)  -     Hemoglobin A1C; Future    Vitamin D deficiency  -     Vitamin D 25 hydroxy; Future    Vitamin B 12 deficiency  -     Vitamin B12; Future  -     Methylmalonic acid, serum; Future    Other orders  -     Cancel: HIV 1/2 Antigen/Antibody (4th Generation) w Reflex [facility]; Future  -     topiramate (TOPAMAX) 100 mg tablet; 200 mg daily         Subjective:      Patient ID: Kamilah Medrano is a 51 y o  female      Patient here for annual visit, she is generally feeling well  She follows with Dr Regla Nagel since office for Psychiatry, she never diff in leave got a diagnosis of bipolar, she is taking Topamax as a mood stabilizer, she has been through multiple other medications, she takes Vyvanse and Xanax and that seems to be the cocktail that works the best   She was having some insomnia back in the beginning of the last year, that seems to have resolved  Since being on the Topamax she has lost weight she finds that she does not binge eat as much  She just got her mammogram, she is overdue for well-woman, and she has that scheduled as well    She was recently evaluated by Dermatology, and she has something on her foot that she has an appointment with Podiatry  Current Outpatient Medications:     ALPRAZolam (XANAX) 1 mg tablet, Take 1 mg by mouth daily as needed for anxiety , Disp: , Rfl: 0    ibuprofen (MOTRIN) 800 mg tablet, Take 800 mg by mouth every 8 (eight) hours as needed for mild pain, Disp: , Rfl:     topiramate (TOPAMAX) 100 mg tablet, 200 mg daily, Disp: , Rfl:     traZODone (DESYREL) 100 mg tablet, Take 200 mg by mouth daily at bedtime as needed , Disp: , Rfl:     VYVANSE 30 MG capsule, Take 50 mg by mouth as needed , Disp: , Rfl: 0    No results found for this or any previous visit (from the past 1008 hour(s))  The following portions of the patient's history were reviewed and updated as appropriate: allergies, current medications, past family history, past medical history, past social history, past surgical history and problem list      Review of Systems   Constitutional: Negative for appetite change, chills, diaphoresis, fatigue, fever and unexpected weight change  HENT: Negative for postnasal drip and sneezing  Eyes: Negative for visual disturbance  Respiratory: Negative for chest tightness and shortness of breath  Cardiovascular: Negative for chest pain, palpitations and leg swelling  Gastrointestinal: Negative for abdominal pain and blood in stool  Endocrine: Negative for cold intolerance, heat intolerance, polydipsia, polyphagia and polyuria  Genitourinary: Negative for difficulty urinating, dysuria, frequency and urgency  Musculoskeletal: Negative for arthralgias and myalgias  Skin: Negative for rash and wound  Neurological: Negative for dizziness, weakness, light-headedness and headaches  Hematological: Negative for adenopathy  Psychiatric/Behavioral: Negative for confusion, dysphoric mood and sleep disturbance  The patient is not nervous/anxious            Objective:      /86   Pulse 76   Temp 99 6 °F (37 6 °C) (Tympanic)   Ht 5' 8" (1 727 m)   Wt 114 kg (252 lb 3 2 oz)   LMP 04/29/2021 (Exact Date)   SpO2 97%   BMI 38 35 kg/m²        Physical Exam  Constitutional:       General: She is not in acute distress  Appearance: She is well-developed  She is not diaphoretic  HENT:      Head: Normocephalic and atraumatic  Nose: Nose normal    Eyes:      Conjunctiva/sclera: Conjunctivae normal       Pupils: Pupils are equal, round, and reactive to light  Neck:      Musculoskeletal: Normal range of motion and neck supple  Thyroid: No thyromegaly  Vascular: No JVD  Trachea: No tracheal deviation  Cardiovascular:      Rate and Rhythm: Normal rate and regular rhythm  Heart sounds: Normal heart sounds  No murmur  No friction rub  No gallop  Pulmonary:      Effort: Pulmonary effort is normal  No respiratory distress  Breath sounds: Normal breath sounds  No wheezing or rales  Abdominal:      General: Bowel sounds are normal  There is no distension  Palpations: Abdomen is soft  Tenderness: There is no abdominal tenderness  Musculoskeletal: Normal range of motion  Lymphadenopathy:      Cervical: No cervical adenopathy  Skin:     General: Skin is warm and dry  Findings: No rash  Neurological:      Mental Status: She is alert and oriented to person, place, and time  Cranial Nerves: No cranial nerve deficit  Psychiatric:         Behavior: Behavior normal          Thought Content: Thought content normal          Judgment: Judgment normal      BMI Counseling: Body mass index is 38 35 kg/m²  The BMI is above normal  Nutrition recommendations include decreasing portion sizes and consuming healthier snacks  Exercise recommendations include exercising 3-5 times per week  No pharmacotherapy was ordered

## 2021-05-21 ENCOUNTER — TELEPHONE (OUTPATIENT)
Dept: INTERNAL MEDICINE CLINIC | Facility: CLINIC | Age: 46
End: 2021-05-21

## 2021-05-21 ENCOUNTER — TRANSCRIBE ORDERS (OUTPATIENT)
Dept: LAB | Facility: HOSPITAL | Age: 46
End: 2021-05-21

## 2021-05-21 DIAGNOSIS — E83.52 SERUM CALCIUM ELEVATED: Primary | ICD-10-CM

## 2021-05-21 DIAGNOSIS — E78.5 DYSLIPIDEMIA: Primary | ICD-10-CM

## 2021-05-21 LAB
HIV 1+2 AB+HIV1 P24 AG SERPL QL IA: NORMAL
PTH-INTACT SERPL-MCNC: 35.2 PG/ML (ref 18.4–80.1)

## 2021-05-21 NOTE — TELEPHONE ENCOUNTER
When pt looked at B 12 result she saw it on mychart being normal, she wants you to double check that please  High calcium- hyperparathyroidism , she is having multiple symptoms of this  Anxiety  Depression  Fatigue  Bone issues   Wants to know if she has HTP, is there any tests she can do so she can make sure she doesn't have this       She does not drink milk, eat yogurt or cheese she doesn't understand why calcium is high

## 2021-05-21 NOTE — TELEPHONE ENCOUNTER
----- Message from Christian Hanley, [address] Stephenson sent at 5/21/2021  4:19 PM EDT -----  Notify normal PTH

## 2021-05-21 NOTE — TELEPHONE ENCOUNTER
----- Message from Jarvis [facility], [address] sent at 5/21/2021 11:52 AM EDT -----  Labs look ok   Her vitamin b 12 is low normal- I would recommend a b12 supplement   1000mcg daily    Vitamin d is a little low so if not taking supplement I would take 2000 units daily     Sugar and cholesterol are normal    Chrloride and calcium are a little high that is related to not drinking enough or being hydrated prior to labs

## 2021-05-24 DIAGNOSIS — E83.52 SERUM CALCIUM ELEVATED: Primary | ICD-10-CM

## 2021-05-24 DIAGNOSIS — E78.5 DYSLIPIDEMIA: ICD-10-CM

## 2021-05-24 DIAGNOSIS — E55.9 VITAMIN D DEFICIENCY: ICD-10-CM

## 2021-05-24 DIAGNOSIS — R73.01 IFG (IMPAIRED FASTING GLUCOSE): ICD-10-CM

## 2021-05-24 DIAGNOSIS — E53.8 VITAMIN B 12 DEFICIENCY: ICD-10-CM

## 2021-05-29 LAB
METHYLMALONATE SERPL-SCNC: 265 NMOL/L (ref 0–378)
SL AMB DISCLAIMER: NORMAL

## 2021-08-05 ENCOUNTER — TELEPHONE (OUTPATIENT)
Dept: INTERNAL MEDICINE CLINIC | Facility: CLINIC | Age: 46
End: 2021-08-05

## 2021-08-19 NOTE — PATIENT INSTRUCTIONS
Generally healthy 70-year-old female, recommend continue with weight loss efforts diet exercise     obstructive sleep apnea stable on CPAP she requires a new machine as her current is broken beyond repair     health maintenance tetanus given otherwise up-to-date     depression anxiety question bipolar following with Psychiatry

## 2021-08-20 ENCOUNTER — TELEPHONE (OUTPATIENT)
Dept: INTERNAL MEDICINE CLINIC | Facility: CLINIC | Age: 46
End: 2021-08-20

## 2021-08-20 NOTE — TELEPHONE ENCOUNTER
----- Message from Harvey Pa sent at 8/19/2021  7:46 AM EDT -----  Regarding: FW: FW: Prescription Question  Contact: [phone]  Can someone take care of this please    ----- Message -----  From: Manju Fu  Sent: 8/18/2021  11:14 AM EDT  To: TORI Pa  Subject: FW: FW: Prescription Question                      ----- Message -----  From: Kelsey Llanos  Sent: 8/17/2021   4:40 PM EDT  To: [facility] Internal Med Clinical  Subject: RE: FW: Prescription Question                    Ismael Winters - I hope your vacation was excellent  Were you able to look into this? They said they needed office notes and sleep study   Please let me know if there is anything I need to do on my end  Jose

## 2021-09-21 ENCOUNTER — TELEMEDICINE (OUTPATIENT)
Dept: INTERNAL MEDICINE CLINIC | Facility: CLINIC | Age: 46
End: 2021-09-21
Payer: COMMERCIAL

## 2021-09-21 DIAGNOSIS — B34.9 VIRAL INFECTION, UNSPECIFIED: Primary | ICD-10-CM

## 2021-09-21 PROCEDURE — 99213 OFFICE O/P EST LOW 20 MIN: CPT | Performed by: NURSE PRACTITIONER

## 2021-09-21 PROCEDURE — U0005 INFEC AGEN DETEC AMPLI PROBE: HCPCS | Performed by: NURSE PRACTITIONER

## 2021-09-21 PROCEDURE — U0003 INFECTIOUS AGENT DETECTION BY NUCLEIC ACID (DNA OR RNA); SEVERE ACUTE RESPIRATORY SYNDROME CORONAVIRUS 2 (SARS-COV-2) (CORONAVIRUS DISEASE [COVID-19]), AMPLIFIED PROBE TECHNIQUE, MAKING USE OF HIGH THROUGHPUT TECHNOLOGIES AS DESCRIBED BY CMS-2020-01-R: HCPCS | Performed by: NURSE PRACTITIONER

## 2021-09-21 NOTE — PROGRESS NOTES
COVID-19 Outpatient Progress Note    Assessment/Plan:    Problem List Items Addressed This Visit     None         Disposition:     I recommended self-quarantine for 10 days and to watch for symptoms until 14 days after exposure  If patient were to develop symptoms, they should self isolate and call our office for further guidance  I have spent 15 minutes directly with the patient  Greater than 50% of this time was spent in counseling/coordination of care regarding: diagnostic results, risks and benefits of treatment options and instructions for management  Verification of patient location:    Patient is located in the following state in which I hold an active license [place]    Encounter provider Jing Hernandez, 10 [facility]    Provider located at [address] [place] [address]    Recent Visits  No visits were found meeting these conditions  Showing recent visits within past 7 days and meeting all other requirements  Future Appointments  No visits were found meeting these conditions  Showing future appointments within next 150 days and meeting all other requirements     This virtual check-in was done via [employer] and patient was informed that this is a secure, HIPAA-compliant platform  She agrees to proceed  Patient agrees to participate in a virtual check in via telephone or video visit instead of presenting to the office to address urgent/immediate medical needs  Patient is aware this is a billable service  After connecting through [facility], the patient was identified by name and date of birth  Edda Patterson was informed that this was a telemedicine visit and that the exam was being conducted confidentially over secure lines  My office door was closed  No one else was in the room  Edda Patterson acknowledged consent and understanding of privacy and security of the telemedicine visit   I informed the patient that I have reviewed her record in [address] [place] [address] and presented the opportunity for her to ask any questions regarding the visit today  The patient agreed to participate  Subjective:   Rain Boone is a 51 y o  female who is concerned about COVID-19  Patient's symptoms include fever, chills, fatigue, nasal congestion, sore throat, loss of taste, cough, shortness of breath, chest tightness, myalgias and headache  Date of symptom onset: 2021  COVID-19 vaccination status: Fully vaccinated    Exposure:   Contact with a person who is under investigation (PUI) for or who is positive for COVID-19 within the last 14 days?: Yes    Hospitalized recently for fever and/or lower respiratory symptoms?: No      Currently a healthcare worker that is involved in direct patient care?: No      No results found for: [address] [place] [address] 98, [address] [address]  Past Medical History:   Diagnosis Date    Anxiety     Chronic post-traumatic stress disorder (PTSD)     Hypertension     Obesity      Past Surgical History:   Procedure Laterality Date    BREAST BIOPSY Left 2012     SECTION       Current Outpatient Medications   Medication Sig Dispense Refill    ALPRAZolam (XANAX) 1 mg tablet Take 1 mg by mouth daily as needed for anxiety   0    ibuprofen (MOTRIN) 800 mg tablet Take 800 mg by mouth every 8 (eight) hours as needed for mild pain      topiramate (TOPAMAX) 100 mg tablet 200 mg daily      traZODone (DESYREL) 100 mg tablet Take 200 mg by mouth daily at bedtime as needed       VYVANSE 30 MG capsule Take 50 mg by mouth as needed   0     No current facility-administered medications for this visit  No Known Allergies    Review of Systems   Constitutional: Positive for chills, fatigue and fever  HENT: Positive for congestion and sore throat  Respiratory: Positive for cough, chest tightness and shortness of breath  Musculoskeletal: Positive for myalgias  Neurological: Positive for headaches  Objective:     There were no vitals filed for this visit  Physical Exam  Constitutional:       Appearance: She is well-developed  HENT:      Head: Normocephalic and atraumatic  Eyes:      Pupils: Pupils are equal, round, and reactive to light  Pulmonary:      Effort: Pulmonary effort is normal    Neurological:      Mental Status: She is alert and oriented to person, place, and time  VIRTUAL VISIT DISCLAIMER    Lloydcarmen Katia verbally agrees to participate in [employer]  Pt is aware that [employer] could be limited without vital signs or the ability to perform a full hands-on physical exam  Mario Palm understands she or the provider may request at any time to terminate the video visit and request the patient to seek care or treatment in person

## 2021-09-22 LAB — SARS-COV-2 RNA RESP QL NAA+PROBE: POSITIVE

## 2021-09-23 ENCOUNTER — TELEPHONE (OUTPATIENT)
Dept: INTERNAL MEDICINE CLINIC | Facility: CLINIC | Age: 46
End: 2021-09-23

## 2021-09-23 ENCOUNTER — TELEMEDICINE (OUTPATIENT)
Dept: INTERNAL MEDICINE CLINIC | Facility: CLINIC | Age: 46
End: 2021-09-23
Payer: COMMERCIAL

## 2021-09-23 DIAGNOSIS — U07.1 COVID-19: Primary | ICD-10-CM

## 2021-09-23 PROCEDURE — 99213 OFFICE O/P EST LOW 20 MIN: CPT | Performed by: INTERNAL MEDICINE

## 2021-09-23 NOTE — PROGRESS NOTES
COVID-19 Outpatient Progress Note    Assessment/Plan:    Problem List Items Addressed This Visit     None         Disposition:     I recommended continued isolation until at least 24 hours have passed since recovery defined as resolution of fever without the use of fever-reducing medications AND improvement in COVID symptoms AND 10 days have passed since onset of symptoms (or 10 days have passed since date of first positive viral diagnostic test for asymptomatic patients)  I have spent 13 minutes directly with the patient  Greater than 50% of this time was spent in counseling/coordination of care regarding: prognosis, patient and family education, importance of treatment compliance and impressions  Verification of patient location:    Patient is located in the following [facility] in which I hold an active license [place]    Encounter provider Ollie Cheng MD    Provider located at [address] [place] [address]    Recent Visits  Date Type Provider Dept   09/21/21 [facility]medicine [address] [facility] recent visits within past 7 days and meeting all other requirements  Today's Visits  Date Type Provider Dept   09/23/21 55 Libra Lewis MD [address] [place] [address]   09/23/21 Telephone [address] today's visits and meeting all other requirements  Future Appointments  No visits were found meeting these conditions  Showing future appointments within next 150 days and meeting all other requirements     This virtual check-in was done via [employer] and patient was informed that this is a secure, HIPAA-compliant platform  She agrees to proceed  Patient agrees to participate in a virtual check in via telephone or video visit instead of presenting to the office to address urgent/immediate medical needs   Patient is aware this is a billable service  After connecting through [facility], the patient was identified by name and date of birth  Shantell Poe was informed that this was a telemedicine visit and that the exam was being conducted confidentially over secure lines  Shantell Poe acknowledged consent and understanding of privacy and security of the telemedicine visit  I informed the patient that I have reviewed her record in Epic and presented the opportunity for her to ask any questions regarding the visit today  The patient agreed to participate  Subjective:   Shantell Poe is a 51 y o  female who has been screened for COVID-19  Symptom change since last report: resolving  Patient's symptoms include nasal congestion and cough  Patient denies fever, chills, fatigue, malaise, rhinorrhea, sore throat, anosmia, loss of taste, shortness of breath, chest tightness, abdominal pain, nausea, vomiting, diarrhea, myalgias and headaches  Date of symptom onset: 2021  Date of positive COVID-19 PCR: 2021  COVID-19 vaccination status: Fully vaccinated    Antony Herrera has been staying home and has isolated themselves in her home  She is taking care to not share personal items and is cleaning all surfaces that are touched often, like counters, tabletops, and doorknobs using household cleaning sprays or wipes  She is wearing a mask when she leaves her room       Lab Results   Component Value Date    SARSCOV2 Positive (A) 2021    SARSCOV2 Positive (A) 2021     Past Medical History:   Diagnosis Date    Anxiety     Chronic post-traumatic stress disorder (PTSD)     Hypertension     Obesity      Past Surgical History:   Procedure Laterality Date    BREAST BIOPSY Left 2012     SECTION       Current Outpatient Medications   Medication Sig Dispense Refill    ALPRAZolam (XANAX) 1 mg tablet Take 1 mg by mouth daily as needed for anxiety   0    ibuprofen (MOTRIN) 800 mg tablet Take 800 mg by mouth every 8 (eight) hours as needed for mild pain      topiramate (TOPAMAX) 100 mg tablet 200 mg daily      traZODone (DESYREL) 100 mg tablet Take 200 mg by mouth daily at bedtime as needed       VYVANSE 30 MG capsule Take 50 mg by mouth as needed   0     No current facility-administered medications for this visit  No Known Allergies    Review of Systems   Constitutional: Negative for chills, fatigue and fever  HENT: Positive for congestion  Negative for rhinorrhea and sore throat  Respiratory: Positive for cough  Negative for chest tightness and shortness of breath  Gastrointestinal: Negative for abdominal pain, diarrhea, nausea and vomiting  Musculoskeletal: Negative for myalgias  Neurological: Negative for headaches  Objective: There were no vitals filed for this visit  Physical Exam  Constitutional:       Appearance: She is well-developed  HENT:      Head: Normocephalic and atraumatic  Pulmonary:      Effort: Pulmonary effort is normal    Neurological:      Mental Status: She is oriented to person, place, and time  Psychiatric:         Behavior: Behavior normal  Behavior is cooperative  Thought Content: Thought content normal          Judgment: Judgment normal          VIRTUAL VISIT DISCLAIMER    Madyson Notice verbally agrees to participate in [employer]  Pt is aware that [employer] could be limited without vital signs or the ability to perform a full hands-on physical exam  Mario Palm understands she or the provider may request at any time to terminate the video visit and request the patient to seek care or treatment in person

## 2021-09-23 NOTE — TELEPHONE ENCOUNTER
----- Message from Richa Coles MD sent at 9/23/2021  7:06 AM EDT -----  Regarding: Covid f/u   Can you please add on at 230 or other open spot for VV ?

## 2021-10-07 ENCOUNTER — TELEPHONE (OUTPATIENT)
Dept: INTERNAL MEDICINE CLINIC | Facility: CLINIC | Age: 46
End: 2021-10-07

## 2021-10-08 ENCOUNTER — OFFICE VISIT (OUTPATIENT)
Dept: INTERNAL MEDICINE CLINIC | Facility: CLINIC | Age: 46
End: 2021-10-08
Payer: COMMERCIAL

## 2021-10-08 VITALS
DIASTOLIC BLOOD PRESSURE: 80 MMHG | RESPIRATION RATE: 18 BRPM | TEMPERATURE: 97.3 F | HEART RATE: 90 BPM | HEIGHT: 68 IN | WEIGHT: 250 LBS | BODY MASS INDEX: 37.89 KG/M2 | OXYGEN SATURATION: 94 % | SYSTOLIC BLOOD PRESSURE: 130 MMHG

## 2021-10-08 DIAGNOSIS — U07.1 COVID: Primary | ICD-10-CM

## 2021-10-08 PROCEDURE — 99213 OFFICE O/P EST LOW 20 MIN: CPT | Performed by: NURSE PRACTITIONER

## 2021-10-08 PROCEDURE — 3008F BODY MASS INDEX DOCD: CPT | Performed by: NURSE PRACTITIONER

## 2021-10-08 PROCEDURE — 1036F TOBACCO NON-USER: CPT | Performed by: NURSE PRACTITIONER

## 2021-10-08 RX ORDER — LEMBOREXANT 5 MG/1
5 TABLET, FILM COATED ORAL
COMMUNITY

## 2021-12-10 ENCOUNTER — TELEMEDICINE (OUTPATIENT)
Dept: INTERNAL MEDICINE CLINIC | Facility: CLINIC | Age: 46
End: 2021-12-10
Payer: COMMERCIAL

## 2021-12-10 ENCOUNTER — TELEPHONE (OUTPATIENT)
Dept: INTERNAL MEDICINE CLINIC | Facility: CLINIC | Age: 46
End: 2021-12-10

## 2021-12-10 DIAGNOSIS — B34.9 VIRAL INFECTION, UNSPECIFIED: Primary | ICD-10-CM

## 2021-12-10 PROCEDURE — 99213 OFFICE O/P EST LOW 20 MIN: CPT | Performed by: NURSE PRACTITIONER

## 2021-12-16 ENCOUNTER — OFFICE VISIT (OUTPATIENT)
Dept: INTERNAL MEDICINE CLINIC | Facility: CLINIC | Age: 46
End: 2021-12-16
Payer: COMMERCIAL

## 2021-12-16 VITALS
DIASTOLIC BLOOD PRESSURE: 84 MMHG | OXYGEN SATURATION: 95 % | TEMPERATURE: 97.2 F | SYSTOLIC BLOOD PRESSURE: 138 MMHG | BODY MASS INDEX: 38.22 KG/M2 | WEIGHT: 252.2 LBS | HEART RATE: 93 BPM | RESPIRATION RATE: 16 BRPM | HEIGHT: 68 IN

## 2021-12-16 DIAGNOSIS — R05.9 COUGH: ICD-10-CM

## 2021-12-16 DIAGNOSIS — Z12.4 SCREENING FOR CERVICAL CANCER: Primary | ICD-10-CM

## 2021-12-16 PROBLEM — F41.1 GENERALIZED ANXIETY DISORDER: Status: ACTIVE | Noted: 2021-12-16

## 2021-12-16 PROBLEM — F32.81 PREMENSTRUAL DYSPHORIC DISORDER: Status: ACTIVE | Noted: 2021-12-16

## 2021-12-16 PROBLEM — F43.10 POST TRAUMATIC STRESS DISORDER (PTSD): Status: ACTIVE | Noted: 2021-12-16

## 2021-12-16 PROBLEM — F40.10 SOCIAL PHOBIA: Status: ACTIVE | Noted: 2021-12-16

## 2021-12-16 PROBLEM — F90.2 ATTENTION DEFICIT HYPERACTIVITY DISORDER (ADHD), COMBINED TYPE: Status: ACTIVE | Noted: 2021-12-16

## 2021-12-16 PROCEDURE — 1036F TOBACCO NON-USER: CPT | Performed by: NURSE PRACTITIONER

## 2021-12-16 PROCEDURE — 3008F BODY MASS INDEX DOCD: CPT | Performed by: NURSE PRACTITIONER

## 2021-12-16 PROCEDURE — 87636 SARSCOV2 & INF A&B AMP PRB: CPT | Performed by: NURSE PRACTITIONER

## 2021-12-16 PROCEDURE — 99213 OFFICE O/P EST LOW 20 MIN: CPT | Performed by: NURSE PRACTITIONER

## 2021-12-16 RX ORDER — AMOXICILLIN AND CLAVULANATE POTASSIUM 875; 125 MG/1; MG/1
1 TABLET, FILM COATED ORAL EVERY 12 HOURS SCHEDULED
Qty: 10 TABLET | Refills: 0 | Status: SHIPPED | OUTPATIENT
Start: 2021-12-16 | End: 2021-12-20 | Stop reason: SDUPTHER

## 2021-12-18 ENCOUNTER — TELEPHONE (OUTPATIENT)
Dept: INTERNAL MEDICINE CLINIC | Facility: CLINIC | Age: 46
End: 2021-12-18

## 2021-12-18 LAB
FLUAV RNA RESP QL NAA+PROBE: NEGATIVE
FLUBV RNA RESP QL NAA+PROBE: NEGATIVE
SARS-COV-2 RNA RESP QL NAA+PROBE: NEGATIVE

## 2022-05-20 ENCOUNTER — OFFICE VISIT (OUTPATIENT)
Dept: INTERNAL MEDICINE CLINIC | Facility: CLINIC | Age: 47
End: 2022-05-20
Payer: COMMERCIAL

## 2022-05-20 ENCOUNTER — TELEPHONE (OUTPATIENT)
Dept: INTERNAL MEDICINE CLINIC | Facility: CLINIC | Age: 47
End: 2022-05-20

## 2022-05-20 VITALS
BODY MASS INDEX: 37.13 KG/M2 | RESPIRATION RATE: 16 BRPM | HEIGHT: 68 IN | DIASTOLIC BLOOD PRESSURE: 86 MMHG | WEIGHT: 245 LBS | HEART RATE: 86 BPM | SYSTOLIC BLOOD PRESSURE: 122 MMHG

## 2022-05-20 DIAGNOSIS — F33.9 DEPRESSION, RECURRENT (HCC): ICD-10-CM

## 2022-05-20 DIAGNOSIS — F33.9 RECURRENT MAJOR DEPRESSIVE DISORDER, REMISSION STATUS UNSPECIFIED (HCC): ICD-10-CM

## 2022-05-20 DIAGNOSIS — M46.1 SACROILIITIS (HCC): Primary | ICD-10-CM

## 2022-05-20 DIAGNOSIS — Z12.31 ENCOUNTER FOR SCREENING MAMMOGRAM FOR BREAST CANCER: ICD-10-CM

## 2022-05-20 DIAGNOSIS — Z23 ENCOUNTER FOR IMMUNIZATION: ICD-10-CM

## 2022-05-20 DIAGNOSIS — Z12.4 SCREENING FOR CERVICAL CANCER: ICD-10-CM

## 2022-05-20 PROCEDURE — 99213 OFFICE O/P EST LOW 20 MIN: CPT | Performed by: NURSE PRACTITIONER

## 2022-05-20 PROCEDURE — 3008F BODY MASS INDEX DOCD: CPT | Performed by: NURSE PRACTITIONER

## 2022-05-20 PROCEDURE — 1036F TOBACCO NON-USER: CPT | Performed by: NURSE PRACTITIONER

## 2022-05-20 RX ORDER — DULOXETIN HYDROCHLORIDE 30 MG/1
30 CAPSULE, DELAYED RELEASE ORAL DAILY
COMMUNITY
Start: 2022-03-10 | End: 2022-05-20 | Stop reason: SDUPTHER

## 2022-05-20 RX ORDER — IBUPROFEN 800 MG/1
800 TABLET ORAL EVERY 8 HOURS PRN
Qty: 90 TABLET | Refills: 2 | Status: SHIPPED | OUTPATIENT
Start: 2022-05-20

## 2022-05-20 RX ORDER — DULOXETIN HYDROCHLORIDE 60 MG/1
60 CAPSULE, DELAYED RELEASE ORAL DAILY
Qty: 30 CAPSULE | Refills: 3
Start: 2022-05-20

## 2022-05-20 NOTE — TELEPHONE ENCOUNTER
Vianey Clark can go to a chiropractor today at 2:00 for alignment before going to [place]   She wants Timbo Lowery opinion    Please call her back ASAP [phone]

## 2022-05-20 NOTE — PROGRESS NOTES
[facility]    NAME: Leobardo Bhatt  AGE: 49 y o  SEX: female  : 1975     DATE: 2022     Assessment and Plan:     1  Screening for cervical cancer      2  Encounter for immunization      3  Encounter for screening mammogram for breast cancer  - Mammo screening bilateral w 3d & cad; Future    4  Sacroiliitis (HCC)    - ibuprofen (MOTRIN) 800 mg tablet; Take 1 tablet (800 mg total) by mouth every 8 (eight) hours as needed for mild pain  Dispense: 90 tablet; Refill: 2  Light stetching and warm compresses  5  Recurrent major depressive disorder, remission status unspecified ([facility] [address] )  Following w/ psych  - DULoxetine (CYMBALTA) 60 mg delayed release capsule; Take 1 capsule (60 mg total) by mouth in the morning  Dispense: 30 capsule; Refill: 3      BMI Counseling: Body mass index is 37 25 kg/m²  The BMI is above normal  Nutrition recommendations include decreasing portion sizes and consuming healthier snacks  Exercise recommendations include exercising 3-5 times per week  No pharmacotherapy was ordered  Rationale for BMI follow-up plan is due to patient being overweight or obese  No follow-ups on file  History of Present Illness:   3 month back pain  Mild ache  Going to [place] tomorrow   Ibuprofen 800+ does help   Cut out gluten from idet     Review of Systems:     Review of Systems   Constitutional: Negative for appetite change, chills, diaphoresis, fatigue, fever and unexpected weight change  HENT: Negative for postnasal drip and sneezing  Eyes: Negative for visual disturbance  Respiratory: Negative for chest tightness and shortness of breath  Cardiovascular: Negative for chest pain, palpitations and leg swelling  Gastrointestinal: Negative for abdominal pain and blood in stool  Endocrine: Negative for cold intolerance, heat intolerance, polydipsia, polyphagia and polyuria     Genitourinary: Negative for difficulty urinating, dysuria, frequency and urgency  Musculoskeletal: Positive for back pain  Negative for arthralgias and myalgias  Skin: Negative for rash and wound  Neurological: Negative for dizziness, weakness, light-headedness and headaches  Hematological: Negative for adenopathy  Psychiatric/Behavioral: Negative for confusion, dysphoric mood and sleep disturbance  The patient is not nervous/anxious  Objective:     /86 (BP Location: Left arm, Patient Position: Sitting, Cuff Size: Standard)   Pulse 86   Resp 16   Ht 5' 8" (1 727 m)   Wt 111 kg (245 lb)   BMI 37 25 kg/m²     Physical Exam  Constitutional:       Appearance: She is well-developed  HENT:      Head: Normocephalic and atraumatic  Eyes:      Pupils: Pupils are equal, round, and reactive to light  Neck:      Thyroid: No thyromegaly  Cardiovascular:      Rate and Rhythm: Normal rate and regular rhythm  Heart sounds: No murmur heard  Pulmonary:      Effort: Pulmonary effort is normal       Breath sounds: Normal breath sounds  Abdominal:      General: Bowel sounds are normal       Palpations: Abdomen is soft  Musculoskeletal:         General: Normal range of motion  Cervical back: Normal range of motion and neck supple  Comments: Tenderness to bilateral SI joints   Lymphadenopathy:      Cervical: No cervical adenopathy  Skin:     General: Skin is warm and dry  Neurological:      Mental Status: She is alert and oriented to person, place, and time           TORI Perez  [facility]

## 2022-05-20 NOTE — PATIENT INSTRUCTIONS
Sacroiliitis   WHAT YOU NEED TO KNOW:   Sacroiliitis is a painful swelling of one or both of your sacroiliac joints that lasts at least 3 months  The sacroiliac joint connects your pelvis to the base of your spine  DISCHARGE INSTRUCTIONS:   Medicines:  Ask for more information about these and other medicines you may need to treat sacroiliitis:  Pain medicine: You may be given medicine to take away or decrease pain  Do not wait until the pain is severe before you take your medicine  You may be given the medicine as a pill to swallow or as a lotion that you put on the painful area  NSAIDs  help decrease swelling and pain or fever  This medicine is available with or without a doctor's order  NSAIDs can cause stomach bleeding or kidney problems in certain people  If you take blood thinner medicine, always ask your healthcare provider if NSAIDs are safe for you  Always read the medicine label and follow directions  Muscle relaxers  help decrease pain and muscle spasms  Take your medicine as directed  Contact your healthcare provider if you think your medicine is not helping or if you have side effects  Tell him of her if you are allergic to any medicine  Keep a list of the medicines, vitamins, and herbs you take  Include the amounts, and when and why you take them  Bring the list or the pill bottles to follow-up visits  Carry your medicine list with you in case of an emergency  Physical therapy:  Your healthcare provider may suggest physical therapy  Your physical therapist may teach you exercises to improve posture (the way you stand and sit), flexibility, and strength in your lower back  He may also teach you how to remain safely active and avoid further injury  Follow the exercise plan given to you by your physical therapist   Rest:  Follow your healthcare provider's instructions about how much rest you should get  Avoid activity that worsens your pain    Ice or heat packs:  Use ice or heat packs on the sore area of your body to decrease the pain and swelling  Put ice in a plastic bag covered with a towel on your low back  Cover heated items with a towel to avoid burns  Use ice and heat as directed  Follow up with your healthcare provider or spine specialist within 1 to 2 weeks:  Write down your questions so you remember to ask them during your visits  Contact your healthcare provider or spine specialist if:   Your pain makes it hard for you to do your daily activities, such as work or school  Your pain does not go away after treatment  You feel depressed or anxious  You have questions about your condition or care  Return to the emergency department if:   You have a fever  Your pain is worse than before  Your pain prevents you from sleeping  © Copyright [employer] 2022 Information is for End User's use only and may not be sold, redistributed or otherwise used for commercial purposes  All illustrations and images included in CareNotes® are the copyrighted property of A D A M , Inc  or Manuel Soria   The above information is an  only  It is not intended as medical advice for individual conditions or treatments  Talk to your doctor, nurse or pharmacist before following any medical regimen to see if it is safe and effective for you

## 2022-05-25 ENCOUNTER — TELEPHONE (OUTPATIENT)
Dept: INTERNAL MEDICINE CLINIC | Facility: CLINIC | Age: 47
End: 2022-05-25

## 2022-05-25 NOTE — TELEPHONE ENCOUNTER
PT and family are currently in [place]  PT son tested (+) for covid and is feeling better everyday  Wanted to establish son with this practice ( he is 11, autistic, and suffers from anxiety) - pt states she has discuss this w/ you  Would you be willing to take him on as a pt? Or should she establish w/ Dr Kristina Alvarez?     Please advise: [phone]

## 2022-06-07 ENCOUNTER — OFFICE VISIT (OUTPATIENT)
Dept: INTERNAL MEDICINE CLINIC | Facility: CLINIC | Age: 47
End: 2022-06-07
Payer: COMMERCIAL

## 2022-06-07 ENCOUNTER — APPOINTMENT (OUTPATIENT)
Dept: LAB | Facility: CLINIC | Age: 47
End: 2022-06-07
Payer: COMMERCIAL

## 2022-06-07 VITALS
HEIGHT: 68 IN | BODY MASS INDEX: 37.25 KG/M2 | TEMPERATURE: 98.6 F | WEIGHT: 245.8 LBS | SYSTOLIC BLOOD PRESSURE: 120 MMHG | OXYGEN SATURATION: 98 % | DIASTOLIC BLOOD PRESSURE: 78 MMHG | HEART RATE: 89 BPM

## 2022-06-07 DIAGNOSIS — N91.2 AMENORRHEA: ICD-10-CM

## 2022-06-07 DIAGNOSIS — M54.50 LOW BACK PAIN, UNSPECIFIED BACK PAIN LATERALITY, UNSPECIFIED CHRONICITY, UNSPECIFIED WHETHER SCIATICA PRESENT: Primary | ICD-10-CM

## 2022-06-07 DIAGNOSIS — R73.01 IFG (IMPAIRED FASTING GLUCOSE): ICD-10-CM

## 2022-06-07 DIAGNOSIS — Z12.11 SCREENING FOR COLON CANCER: ICD-10-CM

## 2022-06-07 DIAGNOSIS — E55.9 VITAMIN D DEFICIENCY: ICD-10-CM

## 2022-06-07 DIAGNOSIS — E78.5 HYPERLIPIDEMIA, UNSPECIFIED HYPERLIPIDEMIA TYPE: ICD-10-CM

## 2022-06-07 DIAGNOSIS — Z12.4 SCREENING FOR CERVICAL CANCER: ICD-10-CM

## 2022-06-07 LAB
25(OH)D3 SERPL-MCNC: 32.1 NG/ML (ref 30–100)
ALBUMIN SERPL BCP-MCNC: 4.1 G/DL (ref 3.5–5)
ALP SERPL-CCNC: 68 U/L (ref 46–116)
ALT SERPL W P-5'-P-CCNC: 42 U/L (ref 12–78)
ANION GAP SERPL CALCULATED.3IONS-SCNC: 7 MMOL/L (ref 4–13)
AST SERPL W P-5'-P-CCNC: 20 U/L (ref 5–45)
BASOPHILS # BLD AUTO: 0.04 THOUSANDS/ΜL (ref 0–0.1)
BASOPHILS NFR BLD AUTO: 1 % (ref 0–1)
BILIRUB SERPL-MCNC: 0.66 MG/DL (ref 0.2–1)
BUN SERPL-MCNC: 17 MG/DL (ref 5–25)
CALCIUM SERPL-MCNC: 9.6 MG/DL (ref 8.3–10.1)
CHLORIDE SERPL-SCNC: 112 MMOL/L (ref 100–108)
CHOLEST SERPL-MCNC: 208 MG/DL
CO2 SERPL-SCNC: 22 MMOL/L (ref 21–32)
CREAT SERPL-MCNC: 0.8 MG/DL (ref 0.6–1.3)
CRP SERPL HS-MCNC: <0.9 MG/L
EOSINOPHIL # BLD AUTO: 0.14 THOUSAND/ΜL (ref 0–0.61)
EOSINOPHIL NFR BLD AUTO: 2 % (ref 0–6)
ERYTHROCYTE [DISTWIDTH] IN BLOOD BY AUTOMATED COUNT: 13.8 % (ref 11.6–15.1)
EST. AVERAGE GLUCOSE BLD GHB EST-MCNC: 111 MG/DL
ESTRADIOL SERPL-MCNC: 36 PG/ML
FSH SERPL-ACNC: 19.5 MIU/ML
GFR SERPL CREATININE-BSD FRML MDRD: 87 ML/MIN/1.73SQ M
GLUCOSE P FAST SERPL-MCNC: 120 MG/DL (ref 65–99)
HBA1C MFR BLD: 5.5 %
HCT VFR BLD AUTO: 42.5 % (ref 34.8–46.1)
HDLC SERPL-MCNC: 47 MG/DL
HGB BLD-MCNC: 14.1 G/DL (ref 11.5–15.4)
IMM GRANULOCYTES # BLD AUTO: 0.04 THOUSAND/UL (ref 0–0.2)
IMM GRANULOCYTES NFR BLD AUTO: 1 % (ref 0–2)
LDLC SERPL CALC-MCNC: 133 MG/DL (ref 0–100)
LH SERPL-ACNC: 12.1 MIU/ML
LYMPHOCYTES # BLD AUTO: 2.02 THOUSANDS/ΜL (ref 0.6–4.47)
LYMPHOCYTES NFR BLD AUTO: 28 % (ref 14–44)
MCH RBC QN AUTO: 30.5 PG (ref 26.8–34.3)
MCHC RBC AUTO-ENTMCNC: 33.2 G/DL (ref 31.4–37.4)
MCV RBC AUTO: 92 FL (ref 82–98)
MONOCYTES # BLD AUTO: 0.58 THOUSAND/ΜL (ref 0.17–1.22)
MONOCYTES NFR BLD AUTO: 8 % (ref 4–12)
NEUTROPHILS # BLD AUTO: 4.29 THOUSANDS/ΜL (ref 1.85–7.62)
NEUTS SEG NFR BLD AUTO: 60 % (ref 43–75)
NONHDLC SERPL-MCNC: 161 MG/DL
NRBC BLD AUTO-RTO: 0 /100 WBCS
PLATELET # BLD AUTO: 343 THOUSANDS/UL (ref 149–390)
PMV BLD AUTO: 10.9 FL (ref 8.9–12.7)
POTASSIUM SERPL-SCNC: 3.7 MMOL/L (ref 3.5–5.3)
PROT SERPL-MCNC: 7.8 G/DL (ref 6.4–8.2)
RBC # BLD AUTO: 4.63 MILLION/UL (ref 3.81–5.12)
SODIUM SERPL-SCNC: 141 MMOL/L (ref 136–145)
TRIGL SERPL-MCNC: 138 MG/DL
TSH SERPL DL<=0.05 MIU/L-ACNC: 1.98 UIU/ML (ref 0.45–4.5)
WBC # BLD AUTO: 7.11 THOUSAND/UL (ref 4.31–10.16)

## 2022-06-07 PROCEDURE — 80053 COMPREHEN METABOLIC PANEL: CPT

## 2022-06-07 PROCEDURE — 36415 COLL VENOUS BLD VENIPUNCTURE: CPT

## 2022-06-07 PROCEDURE — 80061 LIPID PANEL: CPT

## 2022-06-07 PROCEDURE — 82306 VITAMIN D 25 HYDROXY: CPT

## 2022-06-07 PROCEDURE — 82670 ASSAY OF TOTAL ESTRADIOL: CPT

## 2022-06-07 PROCEDURE — 83002 ASSAY OF GONADOTROPIN (LH): CPT

## 2022-06-07 PROCEDURE — 83001 ASSAY OF GONADOTROPIN (FSH): CPT

## 2022-06-07 PROCEDURE — 84443 ASSAY THYROID STIM HORMONE: CPT

## 2022-06-07 PROCEDURE — 86141 C-REACTIVE PROTEIN HS: CPT

## 2022-06-07 PROCEDURE — 85025 COMPLETE CBC W/AUTO DIFF WBC: CPT

## 2022-06-07 PROCEDURE — 83036 HEMOGLOBIN GLYCOSYLATED A1C: CPT

## 2022-06-07 PROCEDURE — 99214 OFFICE O/P EST MOD 30 MIN: CPT | Performed by: NURSE PRACTITIONER

## 2022-06-07 RX ORDER — GABAPENTIN 100 MG/1
100 CAPSULE ORAL 2 TIMES DAILY
Qty: 60 CAPSULE | Refills: 2 | Status: SHIPPED | OUTPATIENT
Start: 2022-06-07

## 2022-06-07 NOTE — PROGRESS NOTES
[facility]    NAME: Kristal Bravo  AGE: 49 y o  SEX: female  : 1975     DATE: 2022     Assessment and Plan:     1  Low back pain, unspecified back pain laterality, unspecified chronicity, unspecified whether sciatica present  - Ambulatory Referral to Physical Therapy; Future  - gabapentin (Neurontin) 100 mg capsule; Take 1 capsule (100 mg total) by mouth 2 (two) times a day  Dispense: 60 capsule; Refill: 2  If not improving consider MRI vs pain management  2  Amenorrhea  - Estradiol; Future  - Luteinizing hormone; Future  - Follicle stimulating hormone; Future    3  Hyperlipidemia, unspecified hyperlipidemia type  - CBC and differential; Future  - Comprehensive metabolic panel; Future  - Lipid panel; Future  - TSH, 3rd generation with Free T4 reflex; Future  - High sensitivity CRP; Future    4  IFG (impaired fasting glucose)  - Hemoglobin A1C; Future    5  Vitamin D deficiency  - Vitamin D 25 hydroxy; Future    6  Screening for colon cancer  - Ambulatory referral for colonoscopy; Future    7  Screening for cervical cancer  - Ambulatory Referral to Gynecology; Future    8  S/p covid  symptoms resolved      No follow-ups on file  History of Present Illness:   Still w/ lower back pain that s/times radiating into buttocks  Was walking a lot on [place] in [place] had a hard time  5-6/10 pain  Taking advil tid   She and her son tested positive for coivd while in [place]     Review of Systems:     Review of Systems   Constitutional: Negative for appetite change, chills, diaphoresis, fatigue, fever and unexpected weight change  HENT: Negative for postnasal drip and sneezing  Eyes: Negative for visual disturbance  Respiratory: Negative for chest tightness and shortness of breath  Cardiovascular: Negative for chest pain, palpitations and leg swelling  Gastrointestinal: Negative for abdominal pain and blood in stool     Endocrine: Negative for cold intolerance, heat intolerance, polydipsia, polyphagia and polyuria  Genitourinary: Negative for difficulty urinating, dysuria, frequency and urgency  Musculoskeletal: Positive for back pain  Negative for arthralgias and myalgias  Skin: Negative for rash and wound  Neurological: Negative for dizziness, weakness, light-headedness and headaches  Hematological: Negative for adenopathy  Psychiatric/Behavioral: Negative for confusion, dysphoric mood and sleep disturbance  The patient is not nervous/anxious  Objective:     /78   Pulse 89   Temp 98 6 °F (37 °C)   Ht 5' 8" (1 727 m)   Wt 111 kg (245 lb 12 8 oz)   SpO2 98%   BMI 37 37 kg/m²     Physical Exam  Constitutional:       Appearance: She is well-developed  HENT:      Head: Normocephalic and atraumatic  Eyes:      Pupils: Pupils are equal, round, and reactive to light  Pulmonary:      Effort: Pulmonary effort is normal    Neurological:      Mental Status: She is alert and oriented to person, place, and time           TORI Rausch  [facility]

## 2022-06-09 ENCOUNTER — TELEPHONE (OUTPATIENT)
Dept: INTERNAL MEDICINE CLINIC | Facility: CLINIC | Age: 47
End: 2022-06-09

## 2022-06-21 ENCOUNTER — OFFICE VISIT (OUTPATIENT)
Dept: INTERNAL MEDICINE CLINIC | Facility: CLINIC | Age: 47
End: 2022-06-21
Payer: COMMERCIAL

## 2022-06-21 VITALS
WEIGHT: 242 LBS | HEIGHT: 68 IN | HEART RATE: 82 BPM | SYSTOLIC BLOOD PRESSURE: 130 MMHG | DIASTOLIC BLOOD PRESSURE: 92 MMHG | BODY MASS INDEX: 36.68 KG/M2 | RESPIRATION RATE: 16 BRPM

## 2022-06-21 DIAGNOSIS — M54.9 BACK PAIN, UNSPECIFIED BACK LOCATION, UNSPECIFIED BACK PAIN LATERALITY, UNSPECIFIED CHRONICITY: ICD-10-CM

## 2022-06-21 DIAGNOSIS — R73.01 IFG (IMPAIRED FASTING GLUCOSE): ICD-10-CM

## 2022-06-21 DIAGNOSIS — F90.2 ATTENTION DEFICIT HYPERACTIVITY DISORDER (ADHD), COMBINED TYPE: ICD-10-CM

## 2022-06-21 DIAGNOSIS — M54.2 NECK PAIN: Primary | ICD-10-CM

## 2022-06-21 DIAGNOSIS — E78.5 DYSLIPIDEMIA: ICD-10-CM

## 2022-06-21 DIAGNOSIS — Z12.4 SCREENING FOR CERVICAL CANCER: ICD-10-CM

## 2022-06-21 PROCEDURE — 3008F BODY MASS INDEX DOCD: CPT | Performed by: NURSE PRACTITIONER

## 2022-06-21 PROCEDURE — 1036F TOBACCO NON-USER: CPT | Performed by: NURSE PRACTITIONER

## 2022-06-21 PROCEDURE — 93000 ELECTROCARDIOGRAM COMPLETE: CPT | Performed by: NURSE PRACTITIONER

## 2022-06-21 PROCEDURE — 99215 OFFICE O/P EST HI 40 MIN: CPT | Performed by: NURSE PRACTITIONER

## 2022-06-21 NOTE — PROGRESS NOTES
[facility]    NAME: Vicente Shine  AGE: 49 y o  SEX: female  : 1975     DATE: 2022     Assessment and Plan:     1  Neck pain  - XR spine cervical 2 or 3 vw injury; Future  - Ambulatory Referral to Physical Therapy; Future    2  Back pain, unspecified back location, unspecified back pain laterality, unspecified chronicity  - XR spine lumbar 2 or 3 views injury; Future  - Ambulatory Referral to Physical Therapy; Future    3  Dyslipidemia  Recommend diet and exercise  - CBC and differential; Future  - Comprehensive metabolic panel; Future  - Lipid panel; Future  - TSH, 3rd generation with Free T4 reflex; Future    4  IFG (impaired fasting glucose)  Discussed metabolic syndrome she also has psoc will benefit from metformin and it may add in weight loss   - Hemoglobin A1C; Future  - metFORMIN (GLUCOPHAGE) 500 mg tablet; Take 1 daily x 1 week then increase to 2 tablets  Dispense: 60 tablet; Refill: 2    5  Screening for cervical cancer  - Ambulatory Referral to Gynecology; Future    6  Strong family hx of CAD-   Check baseline ekg         No follow-ups on file  History of Present Illness:     Patient is here to review labs, she noted multiple abnormalities that she is concerned about  She was noted to have an elevated chloride wonders if she is dehydrated, of note she does take multiple psychiatric medications,  She has been making an effort to watch what she eats and exercise more  She thought that she had elevated sugar  Also cholesterol was high  She is concerned because she has strong family history of heart disease with early death  She is not experiencing any chest pain  She still having neck pain and notices that she hunch is over  She also still has lower back pain that radiates into the buttock    She would like to start physical therapy but does note and scenario where she fell on her back and now she is w     Review of Systems: Review of Systems   Constitutional: Negative for appetite change, chills, diaphoresis, fatigue, fever and unexpected weight change  HENT: Negative for postnasal drip and sneezing  Eyes: Negative for visual disturbance  Respiratory: Negative for chest tightness and shortness of breath  Cardiovascular: Negative for chest pain, palpitations and leg swelling  Gastrointestinal: Negative for abdominal pain and blood in stool  Endocrine: Negative for cold intolerance, heat intolerance, polydipsia, polyphagia and polyuria  Genitourinary: Negative for difficulty urinating, dysuria, frequency and urgency  Musculoskeletal: Positive for back pain and neck pain  Negative for arthralgias and myalgias  Skin: Negative for rash and wound  Neurological: Negative for dizziness, weakness, light-headedness and headaches  Hematological: Negative for adenopathy  Psychiatric/Behavioral: Negative for confusion, dysphoric mood and sleep disturbance  The patient is not nervous/anxious  Objective:     /92 (BP Location: Left arm, Patient Position: Sitting, Cuff Size: Standard)   Pulse 82   Resp 16   Ht 5' 8" (1 727 m)   Wt 110 kg (242 lb)   BMI 36 80 kg/m²     Physical Exam  Constitutional:       Appearance: She is well-developed  HENT:      Head: Normocephalic and atraumatic  Eyes:      Pupils: Pupils are equal, round, and reactive to light  Neck:      Thyroid: No thyromegaly  Cardiovascular:      Rate and Rhythm: Normal rate and regular rhythm  Heart sounds: No murmur heard  Pulmonary:      Effort: Pulmonary effort is normal       Breath sounds: Normal breath sounds  Abdominal:      General: Bowel sounds are normal       Palpations: Abdomen is soft  Musculoskeletal:         General: Normal range of motion  Cervical back: Normal range of motion and neck supple  Tenderness: rried about fracture would like imaging  Lymphadenopathy:      Cervical: No cervical adenopathy  Skin:     General: Skin is warm and dry  Neurological:      Mental Status: She is alert and oriented to person, place, and time           TORI Diez  [facility]

## 2022-07-20 DIAGNOSIS — R73.01 IFG (IMPAIRED FASTING GLUCOSE): ICD-10-CM

## 2022-07-28 ENCOUNTER — HOSPITAL ENCOUNTER (OUTPATIENT)
Dept: RADIOLOGY | Facility: HOSPITAL | Age: 47
End: 2022-07-28

## 2022-07-28 ENCOUNTER — HOSPITAL ENCOUNTER (OUTPATIENT)
Dept: RADIOLOGY | Facility: HOSPITAL | Age: 47
Discharge: HOME | End: 2022-07-28
Payer: COMMERCIAL

## 2022-07-28 DIAGNOSIS — M54.2 NECK PAIN: ICD-10-CM

## 2022-07-28 DIAGNOSIS — M54.9 BACK PAIN, UNSPECIFIED BACK LOCATION, UNSPECIFIED BACK PAIN LATERALITY, UNSPECIFIED CHRONICITY: ICD-10-CM

## 2022-07-28 PROCEDURE — 72040 X-RAY EXAM NECK SPINE 2-3 VW: CPT

## 2022-07-28 PROCEDURE — 72100 X-RAY EXAM L-S SPINE 2/3 VWS: CPT

## 2022-08-02 ENCOUNTER — TELEPHONE (OUTPATIENT)
Dept: INTERNAL MEDICINE CLINIC | Facility: CLINIC | Age: 47
End: 2022-08-02

## 2022-08-02 NOTE — TELEPHONE ENCOUNTER
Called pt  And was notified and she will call to get pt appt   And stated she is in a lot of pain  And wondering if gabapentin can be increased

## 2022-08-02 NOTE — TELEPHONE ENCOUNTER
----- Message from Bebeto Villasenor sent at 8/2/2022  3:08 PM EDT -----  Im also emailing this to patient     Her lumbar xray shows malalignment of the vertebrae and some arthritis   The xray of neck shows and old injury to c5 - I think she may have mentioned this  I think PT is appropriate for both these issues

## 2022-08-02 NOTE — RESULT ENCOUNTER NOTE
Im also emailing this to patient     Her lumbar xray shows malalignment of the vertebrae and some arthritis   The xray of neck shows and old injury to c5 - I think she may have mentioned this  I think PT is appropriate for both these issues

## 2022-08-22 ENCOUNTER — TELEPHONE (OUTPATIENT)
Dept: INTERNAL MEDICINE CLINIC | Facility: CLINIC | Age: 47
End: 2022-08-22

## 2022-08-22 NOTE — TELEPHONE ENCOUNTER
Is she looking for an appt? Why does she think food poisoning? Her  is sick too, not sure if related- im seeing him at 1- but I do not have another appt available today  Any belly pain?    You can give her a same day tomorrow

## 2022-08-22 NOTE — TELEPHONE ENCOUNTER
No appts available  Its been 4 nights of nonstop vomiting and diarrhea, feels very weak, has food poisoning, lost 6 pounds,    Pt is taking pedialyte, on yuan diet, on imodium    She is comfortable, with family, drinking lots of water, could wait until tomorrow if necessary, please advise, call back

## 2022-08-23 ENCOUNTER — APPOINTMENT (OUTPATIENT)
Dept: LAB | Facility: CLINIC | Age: 47
End: 2022-08-23
Payer: COMMERCIAL

## 2022-08-23 ENCOUNTER — OFFICE VISIT (OUTPATIENT)
Dept: INTERNAL MEDICINE CLINIC | Facility: CLINIC | Age: 47
End: 2022-08-23
Payer: COMMERCIAL

## 2022-08-23 VITALS
WEIGHT: 218.9 LBS | SYSTOLIC BLOOD PRESSURE: 112 MMHG | TEMPERATURE: 97.8 F | HEIGHT: 68 IN | HEART RATE: 98 BPM | RESPIRATION RATE: 16 BRPM | BODY MASS INDEX: 33.18 KG/M2 | DIASTOLIC BLOOD PRESSURE: 80 MMHG

## 2022-08-23 DIAGNOSIS — R19.7 DIARRHEA, UNSPECIFIED TYPE: ICD-10-CM

## 2022-08-23 DIAGNOSIS — R94.01 ABNORMAL EEG: ICD-10-CM

## 2022-08-23 DIAGNOSIS — F32.A DEPRESSION, UNSPECIFIED DEPRESSION TYPE: ICD-10-CM

## 2022-08-23 DIAGNOSIS — F90.2 ATTENTION DEFICIT HYPERACTIVITY DISORDER (ADHD), COMBINED TYPE: Primary | ICD-10-CM

## 2022-08-23 DIAGNOSIS — M54.50 LOW BACK PAIN, UNSPECIFIED BACK PAIN LATERALITY, UNSPECIFIED CHRONICITY, UNSPECIFIED WHETHER SCIATICA PRESENT: ICD-10-CM

## 2022-08-23 LAB
ALBUMIN SERPL BCP-MCNC: 4.4 G/DL (ref 3.5–5)
ALP SERPL-CCNC: 79 U/L (ref 46–116)
ALT SERPL W P-5'-P-CCNC: 117 U/L (ref 12–78)
ANION GAP SERPL CALCULATED.3IONS-SCNC: 6 MMOL/L (ref 4–13)
AST SERPL W P-5'-P-CCNC: 48 U/L (ref 5–45)
BASOPHILS # BLD AUTO: 0.03 THOUSANDS/ΜL (ref 0–0.1)
BASOPHILS NFR BLD AUTO: 0 % (ref 0–1)
BILIRUB DIRECT SERPL-MCNC: 0.24 MG/DL (ref 0–0.2)
BILIRUB SERPL-MCNC: 0.88 MG/DL (ref 0.2–1)
BUN SERPL-MCNC: 19 MG/DL (ref 5–25)
CALCIUM SERPL-MCNC: 10.5 MG/DL (ref 8.3–10.1)
CHLORIDE SERPL-SCNC: 111 MMOL/L (ref 96–108)
CO2 SERPL-SCNC: 20 MMOL/L (ref 21–32)
CREAT SERPL-MCNC: 1.1 MG/DL (ref 0.6–1.3)
CRP SERPL QL: 9.4 MG/L
EOSINOPHIL # BLD AUTO: 0.08 THOUSAND/ΜL (ref 0–0.61)
EOSINOPHIL NFR BLD AUTO: 1 % (ref 0–6)
ERYTHROCYTE [DISTWIDTH] IN BLOOD BY AUTOMATED COUNT: 14 % (ref 11.6–15.1)
ERYTHROCYTE [SEDIMENTATION RATE] IN BLOOD: 20 MM/HOUR (ref 0–19)
GFR SERPL CREATININE-BSD FRML MDRD: 59 ML/MIN/1.73SQ M
GLUCOSE P FAST SERPL-MCNC: 102 MG/DL (ref 65–99)
HCT VFR BLD AUTO: 45.4 % (ref 34.8–46.1)
HGB BLD-MCNC: 15.3 G/DL (ref 11.5–15.4)
IMM GRANULOCYTES # BLD AUTO: 0.03 THOUSAND/UL (ref 0–0.2)
IMM GRANULOCYTES NFR BLD AUTO: 0 % (ref 0–2)
LYMPHOCYTES # BLD AUTO: 2.17 THOUSANDS/ΜL (ref 0.6–4.47)
LYMPHOCYTES NFR BLD AUTO: 24 % (ref 14–44)
MAGNESIUM SERPL-MCNC: 1.7 MG/DL (ref 1.6–2.6)
MCH RBC QN AUTO: 30.1 PG (ref 26.8–34.3)
MCHC RBC AUTO-ENTMCNC: 33.7 G/DL (ref 31.4–37.4)
MCV RBC AUTO: 89 FL (ref 82–98)
MONOCYTES # BLD AUTO: 0.83 THOUSAND/ΜL (ref 0.17–1.22)
MONOCYTES NFR BLD AUTO: 9 % (ref 4–12)
NEUTROPHILS # BLD AUTO: 6.04 THOUSANDS/ΜL (ref 1.85–7.62)
NEUTS SEG NFR BLD AUTO: 66 % (ref 43–75)
NRBC BLD AUTO-RTO: 0 /100 WBCS
PLATELET # BLD AUTO: 374 THOUSANDS/UL (ref 149–390)
PMV BLD AUTO: 12 FL (ref 8.9–12.7)
POTASSIUM SERPL-SCNC: 3.1 MMOL/L (ref 3.5–5.3)
PROT SERPL-MCNC: 8.3 G/DL (ref 6.4–8.4)
RBC # BLD AUTO: 5.09 MILLION/UL (ref 3.81–5.12)
SODIUM SERPL-SCNC: 137 MMOL/L (ref 135–147)
WBC # BLD AUTO: 9.18 THOUSAND/UL (ref 4.31–10.16)

## 2022-08-23 PROCEDURE — 86140 C-REACTIVE PROTEIN: CPT

## 2022-08-23 PROCEDURE — 85652 RBC SED RATE AUTOMATED: CPT

## 2022-08-23 PROCEDURE — 36415 COLL VENOUS BLD VENIPUNCTURE: CPT

## 2022-08-23 PROCEDURE — 80076 HEPATIC FUNCTION PANEL: CPT

## 2022-08-23 PROCEDURE — 83735 ASSAY OF MAGNESIUM: CPT

## 2022-08-23 PROCEDURE — 85025 COMPLETE CBC W/AUTO DIFF WBC: CPT

## 2022-08-23 PROCEDURE — 80048 BASIC METABOLIC PNL TOTAL CA: CPT

## 2022-08-23 PROCEDURE — 99214 OFFICE O/P EST MOD 30 MIN: CPT | Performed by: NURSE PRACTITIONER

## 2022-08-23 NOTE — PROGRESS NOTES
INTERNAL MEDICINE FOLLOW-UP VISIT  [facility]    NAME: Dorita Lopez  AGE: 49 y o  SEX: female  : 1975     DATE: 2022     Assessment and Plan:   1  Attention deficit hyperactivity disorder (ADHD), combined type  - Ambulatory Referral to Neuropsychology; Future    2  Depression, unspecified depression type  - Ambulatory Referral to Neuropsychology; Future    3  Abnormal EEG  - Ambulatory Referral to Neuropsychology; Future    4  Diarrhea, unspecified type   likely viral gastroenteritis cannot rule out food poisoning except for the duration of symptoms  Check labs replete any electrolyte abnormality  Recommend brat diet increasing fluids if she continues with symptoms consider checking stools consider empiric dose of antibiotics for food poisoning  - Basic metabolic panel; Future  - CBC and differential; Future  - Hepatic function panel; Future  - Sedimentation rate, automated; Future  - C-reactive protein; Future  - Magnesium; Future    5  Low back pain, unspecified back pain laterality, unspecified chronicity, unspecified whether sciatica present  - Ambulatory Referral to Pain Management; Future        No follow-ups on file  Chief Complaint:     Chief Complaint   Patient presents with    Diarrhea    Fatigue    Abdominal Cramping     Symptoms started Thursday  History of Present Illness:      patient has not been feeling well since last Monday she and her  had family over and they ate [employer] food that may have been old  She had some mild nausea vomiting and diarrhea but then it got really worse on Thursday to the point where she just could not be get out of the bathroom she was laying on the floor having bowel movements and vomiting she was not able to eat or drink    Comes  she just felt terrible she thought she needed to go to the emergency room Monday morning which is yesterday she woke up and vomited again had 1 more bout of diarrhea but felt a little bit better was able to get up and eat and drink a little bit  This morning she still feels even better    The following portions of the patient's history were reviewed and updated as appropriate: allergies, current medications, past family history, past medical history, past social history, past surgical history and problem list      Review of Systems:     Review of Systems   Constitutional: Positive for fatigue  Negative for appetite change, chills, diaphoresis, fever and unexpected weight change  HENT: Negative for postnasal drip and sneezing  Eyes: Negative for visual disturbance  Respiratory: Negative for chest tightness and shortness of breath  Cardiovascular: Negative for chest pain, palpitations and leg swelling  Gastrointestinal: Positive for diarrhea, nausea and vomiting  Negative for abdominal pain and blood in stool  Endocrine: Negative for cold intolerance, heat intolerance, polydipsia, polyphagia and polyuria  Genitourinary: Negative for difficulty urinating, dysuria, frequency and urgency  Musculoskeletal: Negative for arthralgias and myalgias  Skin: Negative for rash and wound  Neurological: Positive for weakness  Negative for dizziness, light-headedness and headaches  Hematological: Negative for adenopathy  Psychiatric/Behavioral: Negative for confusion, dysphoric mood and sleep disturbance  The patient is not nervous/anxious  Past Medical History:     Past Medical History:   Diagnosis Date    Anxiety     Chronic post-traumatic stress disorder (PTSD)     Hypertension     Obesity         Current Medications:     Current Outpatient Medications:     ALPRAZolam (XANAX) 1 mg tablet, Take 1 mg by mouth daily as needed for anxiety , Disp: , Rfl: 0    DULoxetine (CYMBALTA) 60 mg delayed release capsule, Take 1 capsule (60 mg total) by mouth in the morning   (Patient taking differently: Take 90 mg by mouth daily), Disp: 30 capsule, Rfl: 3    gabapentin (Neurontin) 100 mg capsule, Take 1 capsule (100 mg total) by mouth 2 (two) times a day, Disp: 60 capsule, Rfl: 2    ibuprofen (MOTRIN) 800 mg tablet, Take 1 tablet (800 mg total) by mouth every 8 (eight) hours as needed for mild pain, Disp: 90 tablet, Rfl: 2    Lemborexant (DayVigo) 5 MG TABS, Take 5 mg combined by mouth daily at bedtime as needed, Disp: , Rfl:     topiramate (TOPAMAX) 100 mg tablet, 200 mg daily, Disp: , Rfl:     traZODone (DESYREL) 100 mg tablet, Take 50 mg by mouth daily at bedtime as needed, Disp: , Rfl:     VYVANSE 30 MG capsule, Take 60 mg by mouth as needed , Disp: , Rfl: 0     Allergies:   No Known Allergies     Physical Exam:     /80 (BP Location: Left arm, Patient Position: Sitting, Cuff Size: Standard)   Pulse 98   Temp 97 8 °F (36 6 °C) (Temporal)   Resp 16   Ht 5' 8" (1 727 m)   Wt 99 3 kg (218 lb 14 4 oz)   BMI 33 28 kg/m²     Physical Exam  Constitutional:       Appearance: She is well-developed  HENT:      Head: Normocephalic and atraumatic  Eyes:      Pupils: Pupils are equal, round, and reactive to light  Neck:      Thyroid: No thyromegaly  Cardiovascular:      Rate and Rhythm: Normal rate and regular rhythm  Heart sounds: No murmur heard  Pulmonary:      Effort: Pulmonary effort is normal       Breath sounds: Normal breath sounds  Abdominal:      General: Bowel sounds are normal       Palpations: Abdomen is soft  Musculoskeletal:         General: Normal range of motion  Cervical back: Normal range of motion and neck supple  Lymphadenopathy:      Cervical: No cervical adenopathy  Skin:     General: Skin is warm and dry  Neurological:      Mental Status: She is alert and oriented to person, place, and time  Data:     Laboratory Results: I have personally reviewed the pertinent laboratory results/reports   Radiology/Other Diagnostic Testing Results: I have personally reviewed pertinent reports        Raeann Burrell Kae Brady, [address] [place] [address]

## 2022-08-24 ENCOUNTER — TELEPHONE (OUTPATIENT)
Dept: INTERNAL MEDICINE CLINIC | Facility: CLINIC | Age: 47
End: 2022-08-24

## 2022-08-24 DIAGNOSIS — R74.8 ELEVATED LIVER ENZYMES: Primary | ICD-10-CM

## 2022-08-24 DIAGNOSIS — E87.6 LOW BLOOD POTASSIUM: ICD-10-CM

## 2022-08-24 NOTE — TELEPHONE ENCOUNTER
----- Message from Harvey Eric sent at 8/24/2022  8:16 AM EDT -----  Her labs are just suggestive of a viral infection and dehydration  Try to eat bananas and gatorade

## 2022-08-25 ENCOUNTER — TELEPHONE (OUTPATIENT)
Dept: OTHER | Facility: OTHER | Age: 47
End: 2022-08-25

## 2022-08-26 ENCOUNTER — HOSPITAL ENCOUNTER (OUTPATIENT)
Dept: MAMMOGRAPHY | Facility: CLINIC | Age: 47
End: 2022-08-26
Payer: COMMERCIAL

## 2022-08-26 VITALS — HEIGHT: 68 IN | WEIGHT: 218 LBS | BODY MASS INDEX: 33.04 KG/M2

## 2022-08-26 DIAGNOSIS — Z12.31 ENCOUNTER FOR SCREENING MAMMOGRAM FOR BREAST CANCER: ICD-10-CM

## 2022-08-26 PROCEDURE — 77067 SCR MAMMO BI INCL CAD: CPT

## 2022-08-26 PROCEDURE — 77063 BREAST TOMOSYNTHESIS BI: CPT

## 2022-08-29 ENCOUNTER — NURSE TRIAGE (OUTPATIENT)
Dept: OTHER | Facility: OTHER | Age: 47
End: 2022-08-29

## 2022-08-29 NOTE — TELEPHONE ENCOUNTER
Regarding: congestion/loss of voice  ----- Message from [facility] sent at 8/29/2022  7:18 AM EDT -----  "I have a cold  I have lost my voice    I had a fever over the weekend and I have congestion in my chest  My at home covid test was negative "

## 2022-08-29 NOTE — TELEPHONE ENCOUNTER
Reason for Disposition   [1] Sore throat with cough/cold symptoms AND [2] present < 5 days    Answer Assessment - Initial Assessment Questions  1  ONSET: "When did the throat start hurting?" (Hours or days ago)       Friday night     2  SEVERITY: "How bad is the sore throat?" (Scale 1-10; mild, moderate or severe)    - MILD (1-3):  doesn't interfere with eating or normal activities    - MODERATE (4-7): interferes with eating some solids and normal activities    - SEVERE (8-10):  excruciating pain, interferes with most normal activities    - SEVERE DYSPHAGIA: can't swallow liquids, drooling      Mild but losing voice    3  STREP EXPOSURE: "Has there been any exposure to strep within the past week?" If Yes, ask: "What type of contact occurred?"       had bacterial infection     4  VIRAL SYMPTOMS: "Are there any symptoms of a cold, such as a runny nose, cough, hoarse voice or red eyes?"      Hoarse voice, chest congestion, productive cough--yellow        5  FEVER: "Do you have a fever?" If Yes, ask: "What is your temperature, how was it measured, and when did it start?"     101 8- gone now     6  PUS ON THE TONSILS: "Is there pus on the tonsils in the back of your throat?"      No    7   OTHER SYMPTOMS: "Do you have any other symptoms?" (e g , difficulty breathing, headache, rash)      None    Protocols used: SORE THROAT-ADULT-

## 2022-08-29 NOTE — TELEPHONE ENCOUNTER
Patient calling in with new onset cough, chest congestion and mucus  She states that she was seen in the office last week for a stomach virus and her  was seen for an URI which she believes she now has  She would like to follow up with Herbert Dunbar to see if she can have something ordered for her symptoms now since her  was also seen by him recently and given an antibiotic  Please follow up with patient  She states if she needs to do another virtual visit she is willing to do that

## 2022-08-30 ENCOUNTER — TELEMEDICINE (OUTPATIENT)
Dept: INTERNAL MEDICINE CLINIC | Facility: CLINIC | Age: 47
End: 2022-08-30
Payer: COMMERCIAL

## 2022-08-30 DIAGNOSIS — J40 BRONCHITIS: Primary | ICD-10-CM

## 2022-08-30 PROCEDURE — 99214 OFFICE O/P EST MOD 30 MIN: CPT | Performed by: NURSE PRACTITIONER

## 2022-08-30 RX ORDER — PROMETHAZINE HYDROCHLORIDE AND CODEINE PHOSPHATE 6.25; 1 MG/5ML; MG/5ML
5 SYRUP ORAL EVERY 4 HOURS PRN
Qty: 240 ML | Refills: 0 | Status: SHIPPED | OUTPATIENT
Start: 2022-08-30 | End: 2022-10-20 | Stop reason: CLARIF

## 2022-08-30 RX ORDER — AMOXICILLIN AND CLAVULANATE POTASSIUM 875; 125 MG/1; MG/1
1 TABLET, FILM COATED ORAL EVERY 12 HOURS SCHEDULED
Qty: 14 TABLET | Refills: 0 | Status: SHIPPED | OUTPATIENT
Start: 2022-08-30 | End: 2022-09-06

## 2022-08-30 RX ORDER — PREDNISONE 10 MG/1
TABLET ORAL
Qty: 18 TABLET | Refills: 0 | Status: SHIPPED | OUTPATIENT
Start: 2022-08-30 | End: 2022-10-20 | Stop reason: CLARIF

## 2022-08-30 NOTE — PROGRESS NOTES
Virtual Regular Visit    Verification of patient location:    Patient is located in the following state in which I hold an active license [place]      Assessment/Plan:  Bronchitis treat w/ steriods, abx, probiotics, and cough meds contact me if not improving    Problem List Items Addressed This Visit    None     Visit Diagnoses     Bronchitis    -  Primary    Relevant Medications    amoxicillin-clavulanate (Augmentin) 875-125 mg per tablet    predniSONE 10 mg tablet    promethazine-codeine (PHENERGAN WITH CODEINE) 6 25-10 mg/5 mL syrup               Reason for visit is   Chief Complaint   Patient presents with    Virtual Regular Visit        Encounter provider TORI Lopez    Provider located at [address] [place] [address]      Recent Visits  Date Type Provider Dept   08/24/22 Telephone Juanis [place]   08/23/22 Office Visit Tr Lopez recent visits within past 7 days and meeting all other requirements  Today's Visits  Date Type Provider Dept   08/30/22 Telemedicine Tr Lopez today's visits and meeting all other requirements  Future Appointments  No visits were found meeting these conditions  Showing future appointments within next 150 days and meeting all other requirements       The patient was identified by name and date of birth  Robbin Munoz was informed that this is a telemedicine visit and that the visit is being conducted through Telephone  My office door was closed  No one else was in the room  She acknowledged consent and understanding of privacy and security of the video platform  The patient has agreed to participate and understands they can discontinue the visit at any time  Patient is aware this is a billable service       Subjective  Robbin Munoz is a 49 y o  female not feeling well for ovef 10 days started w/ diarrhea that finally got back mid week last week then she started w/ sinus, st, pnd, cough and fver  covid negative   sick w/ same thing  otc muicnes not really working  Not getting better  Very tired   HPI     Past Medical History:   Diagnosis Date    Anxiety     Chronic post-traumatic stress disorder (PTSD)     Hypertension     Obesity        Past Surgical History:   Procedure Laterality Date    BREAST BIOPSY Left 2012    benign     SECTION         Current Outpatient Medications   Medication Sig Dispense Refill    amoxicillin-clavulanate (Augmentin) 875-125 mg per tablet Take 1 tablet by mouth every 12 (twelve) hours for 7 days 14 tablet 0    predniSONE 10 mg tablet Take 3 tablets for 3 days then 2 tablets for 3 days then 1 tablet for 3 days 18 tablet 0    promethazine-codeine (PHENERGAN WITH CODEINE) 6 25-10 mg/5 mL syrup Take 5 mL by mouth every 4 (four) hours as needed for cough 240 mL 0    ALPRAZolam (XANAX) 1 mg tablet Take 1 mg by mouth daily as needed for anxiety   0    DULoxetine (CYMBALTA) 60 mg delayed release capsule Take 1 capsule (60 mg total) by mouth in the morning  (Patient taking differently: Take 90 mg by mouth daily) 30 capsule 3    gabapentin (Neurontin) 100 mg capsule Take 1 capsule (100 mg total) by mouth 2 (two) times a day 60 capsule 2    ibuprofen (MOTRIN) 800 mg tablet Take 1 tablet (800 mg total) by mouth every 8 (eight) hours as needed for mild pain 90 tablet 2    Lemborexant (DayVigo) 5 MG TABS Take 5 mg combined by mouth daily at bedtime as needed      topiramate (TOPAMAX) 100 mg tablet 200 mg daily      traZODone (DESYREL) 100 mg tablet Take 50 mg by mouth daily at bedtime as needed      VYVANSE 30 MG capsule Take 60 mg by mouth as needed   0     No current facility-administered medications for this visit  No Known Allergies    Review of Systems   Constitutional: Positive for fatigue   Negative for appetite change, chills, diaphoresis, fever and unexpected weight change  HENT: Positive for facial swelling, postnasal drip, sinus pressure, sore throat and voice change  Negative for sneezing  Eyes: Negative for visual disturbance  Respiratory: Positive for cough  Negative for chest tightness and shortness of breath  Cardiovascular: Negative for chest pain, palpitations and leg swelling  Gastrointestinal: Negative for abdominal pain and blood in stool  Endocrine: Negative for cold intolerance, heat intolerance, polydipsia, polyphagia and polyuria  Genitourinary: Negative for difficulty urinating, dysuria, frequency and urgency  Musculoskeletal: Negative for arthralgias and myalgias  Skin: Negative for rash and wound  Neurological: Negative for dizziness, weakness, light-headedness and headaches  Hematological: Negative for adenopathy  Psychiatric/Behavioral: Negative for confusion, dysphoric mood and sleep disturbance  The patient is not nervous/anxious  It was my intent to perform this visit via video technology but the patient was not able to do a video connection so the visit was completed via audio telephone only  Video Exam    There were no vitals filed for this visit      Physical Exam     I spent 13 minutes directly with the patient during this visit

## 2022-09-01 ENCOUNTER — TELEPHONE (OUTPATIENT)
Dept: INTERNAL MEDICINE CLINIC | Facility: CLINIC | Age: 47
End: 2022-09-01

## 2022-09-01 NOTE — TELEPHONE ENCOUNTER
We can either send to a different pharmacy or she can use over the counter delsym  There isn't really a prescription cough med w/out rolf

## 2022-09-01 NOTE — TELEPHONE ENCOUNTER
[facility] stating [facility] are no longer stocking Promethazine cough syrup w codeine    please send supplement cough syrup

## 2022-09-07 ENCOUNTER — OFFICE VISIT (OUTPATIENT)
Dept: PAIN MEDICINE | Facility: CLINIC | Age: 47
End: 2022-09-07
Payer: COMMERCIAL

## 2022-09-07 VITALS
HEART RATE: 75 BPM | BODY MASS INDEX: 34.53 KG/M2 | DIASTOLIC BLOOD PRESSURE: 87 MMHG | HEIGHT: 68 IN | SYSTOLIC BLOOD PRESSURE: 128 MMHG | WEIGHT: 227.8 LBS

## 2022-09-07 DIAGNOSIS — G89.29 CHRONIC BILATERAL LOW BACK PAIN WITHOUT SCIATICA: ICD-10-CM

## 2022-09-07 DIAGNOSIS — M47.816 LUMBAR FACET ARTHROPATHY: ICD-10-CM

## 2022-09-07 DIAGNOSIS — M54.50 CHRONIC BILATERAL LOW BACK PAIN WITHOUT SCIATICA: ICD-10-CM

## 2022-09-07 DIAGNOSIS — M53.3 SACROILIAC PAIN: Primary | ICD-10-CM

## 2022-09-07 PROCEDURE — 99204 OFFICE O/P NEW MOD 45 MIN: CPT | Performed by: STUDENT IN AN ORGANIZED HEALTH CARE EDUCATION/TRAINING PROGRAM

## 2022-09-07 NOTE — PATIENT INSTRUCTIONS
Sacroiliitis   WHAT YOU NEED TO KNOW:   Sacroiliitis is a painful swelling of one or both of your sacroiliac joints that lasts at least 3 months  The sacroiliac joint connects your pelvis to the base of your spine  DISCHARGE INSTRUCTIONS:   Medicines:  Ask for more information about these and other medicines you may need to treat sacroiliitis:  Pain medicine: You may be given medicine to take away or decrease pain  Do not wait until the pain is severe before you take your medicine  You may be given the medicine as a pill to swallow or as a lotion that you put on the painful area  NSAIDs  help decrease swelling and pain or fever  This medicine is available with or without a doctor's order  NSAIDs can cause stomach bleeding or kidney problems in certain people  If you take blood thinner medicine, always ask your healthcare provider if NSAIDs are safe for you  Always read the medicine label and follow directions  Muscle relaxers  help decrease pain and muscle spasms  Take your medicine as directed  Contact your healthcare provider if you think your medicine is not helping or if you have side effects  Tell him of her if you are allergic to any medicine  Keep a list of the medicines, vitamins, and herbs you take  Include the amounts, and when and why you take them  Bring the list or the pill bottles to follow-up visits  Carry your medicine list with you in case of an emergency  Physical therapy:  Your healthcare provider may suggest physical therapy  Your physical therapist may teach you exercises to improve posture (the way you stand and sit), flexibility, and strength in your lower back  He may also teach you how to remain safely active and avoid further injury  Follow the exercise plan given to you by your physical therapist   Rest:  Follow your healthcare provider's instructions about how much rest you should get  Avoid activity that worsens your pain    Ice or heat packs:  Use ice or heat packs on the sore area of your body to decrease the pain and swelling  Put ice in a plastic bag covered with a towel on your low back  Cover heated items with a towel to avoid burns  Use ice and heat as directed  Follow up with your healthcare provider or spine specialist within 1 to 2 weeks:  Write down your questions so you remember to ask them during your visits  Contact your healthcare provider or spine specialist if:   Your pain makes it hard for you to do your daily activities, such as work or school  Your pain does not go away after treatment  You feel depressed or anxious  You have questions about your condition or care  Return to the emergency department if:   You have a fever  Your pain is worse than before  Your pain prevents you from sleeping  © Copyright [employer] 2022 Information is for End User's use only and may not be sold, redistributed or otherwise used for commercial purposes  All illustrations and images included in CareNotes® are the copyrighted property of A D A M , Inc  or Manuel Soria   The above information is an  only  It is not intended as medical advice for individual conditions or treatments  Talk to your doctor, nurse or pharmacist before following any medical regimen to see if it is safe and effective for you

## 2022-09-07 NOTE — PROGRESS NOTES
Assessment:  1  Sacroiliac pain    2  Lumbar facet arthropathy    3  Chronic bilateral low back pain without sciatica        Plan:  Orders Placed This Encounter   Procedures    FL spine and pain procedure     Standing Status:   Future     Standing Expiration Date:   9/7/2026     Order Specific Question:   Reason for Exam:     Answer:   b/l SI joint injection     Order Specific Question:   Anticoagulant hold needed? Answer:   no     Order Specific Question:   Is the patient pregnant? Answer:   Unknown       No orders of the defined types were placed in this encounter  My impressions and treatment recommendations were discussed in detail with the patient, who verbalized understanding and had no further questions  This is a 49year old female who presents to our office with chief complaint of bilateral low back pain WITHOUT SCIATICA  She has tenderness to palpation over the bilateral SI joints with pain with provocative maneuvers as noted below  Also has notable facet loading towards the left  She has lumbar facet arthropathy noted on imaging with L4 and L5 spondylolisthesis  Appears she is clinically symptomatic from SI joint pain and facet arthropathy  Discussed bilateral SI joint injection under fluoroscopic guidance to help with her symptoms  If still continues to have notable low back pain, then would certainly be candidate for bilateral L4-5 and L5-S1 medial branch block  I strongly encouraged her to begin physical therapy in the meantime and she is working on getting this done  [place] Prescription Drug Monitoring Program report was reviewed and was appropriate     Complete risks and benefits including bleeding, infection, tissue reaction, nerve injury and allergic reaction were discussed  The approach was demonstrated using models and literature was provided  Verbal and written consent was obtained  Discharge instructions were provided   I personally saw and examined the patient and I agree with the above discussed plan of care  History of Present Illness:    Dung Corona is a 49 y o  female who presents to [facility] for initial evaluation of the above stated pain complaints  The patient has a past medical and chronic pain history as outlined in the assessment section  She was referred by TORI Brock  [address] [facility],  [address] [place] [address]   sHE IS here with chief complaint of lower back pain fo rlast 6 months  She is a homemaker  Moderate to severe in intensity over the last month  4/10  Daily and nearly constnat  Worst in the evening and niight  Described as burning, shooting, pressure-like throbbing aching  All POSTIONs hurt after a prolonged issue  Sitting is by the far the worse  Xray lumbar spine shows facet arthropathy L4-5 and L5-S1  Also with grade 1 spondylolisthesis f L4/5  PT ordered but not started yet  Also noted PMh of anxiety, depression, aADHD  nO RELIEF WITH EXERCISE, HEAT/ICE therapy, and chiropractor manipulation  nO TOBACCO, MJ  Rare alcohol  No blood thinners, no allergy to latex or contrast        Review of Systems:    Review of Systems   Constitutional: Negative for fever and unexpected weight change  HENT: Negative for trouble swallowing  Eyes: Negative for visual disturbance  Respiratory: Negative for shortness of breath and wheezing  Cardiovascular: Negative for chest pain and palpitations  Gastrointestinal: Negative for constipation, diarrhea, nausea and vomiting  Endocrine: Negative for cold intolerance, heat intolerance and polydipsia  Genitourinary: Negative for difficulty urinating and frequency  Musculoskeletal: Negative for arthralgias, gait problem, joint swelling and myalgias  Skin: Negative for rash  Neurological: Negative for dizziness, seizures, syncope, weakness and headaches  Hematological: Does not bruise/bleed easily     Psychiatric/Behavioral: Positive for decreased concentration  Negative for dysphoric mood  Anxiety   All other systems reviewed and are negative          Patient Active Problem List   Diagnosis    Anxiety    Depression    Dyslipidemia    Gallstone    Obstructive sleep apnea    LOAIZA (dyspnea on exertion)    Attention deficit hyperactivity disorder (ADHD), combined type    Premenstrual dysphoric disorder    Post traumatic stress disorder (PTSD)    Social phobia    Generalized anxiety disorder       Past Medical History:   Diagnosis Date    Anxiety     Chronic post-traumatic stress disorder (PTSD)     Hypertension     Obesity        Past Surgical History:   Procedure Laterality Date    BREAST BIOPSY Left 2012    benign     SECTION         Family History   Problem Relation Age of Onset    Heart failure Mother     Heart failure Father     No Known Problems Daughter     No Known Problems Daughter     Breast cancer Maternal Grandmother 61    No Known Problems Maternal Grandfather     Breast cancer Paternal Grandmother 61    No Known Problems Paternal Grandfather     Coronary artery disease Family     Heart attack Family     No Known Problems Maternal Aunt        Social History     Occupational History    Not on file   Tobacco Use    Smoking status: Former Smoker     Packs/day: 1 00     Years: 16 00     Pack years: 16 00     Types: Cigarettes     Quit date:      Years since quittin 7    Smokeless tobacco: Never Used   Vaping Use    Vaping Use: Never used   Substance and Sexual Activity    Alcohol use: Yes     Comment: socially    Drug use: Yes     Types: Marijuana     Comment: medical marijuana card    Sexual activity: Yes     Partners: Male         Current Outpatient Medications:     ALPRAZolam (XANAX) 1 mg tablet, Take 1 mg by mouth daily as needed for anxiety PRN, Disp: , Rfl: 0    DULoxetine (CYMBALTA) 60 mg delayed release capsule, Take 1 capsule (60 mg total) by mouth in the morning  (Patient taking differently: Take 90 mg by mouth daily), Disp: 30 capsule, Rfl: 3    gabapentin (Neurontin) 100 mg capsule, Take 1 capsule (100 mg total) by mouth 2 (two) times a day, Disp: 60 capsule, Rfl: 2    ibuprofen (MOTRIN) 800 mg tablet, Take 1 tablet (800 mg total) by mouth every 8 (eight) hours as needed for mild pain, Disp: 90 tablet, Rfl: 2    Lemborexant (DayVigo) 5 MG TABS, Take 5 mg combined by mouth daily at bedtime as needed, Disp: , Rfl:     predniSONE 10 mg tablet, Take 3 tablets for 3 days then 2 tablets for 3 days then 1 tablet for 3 days, Disp: 18 tablet, Rfl: 0    promethazine-codeine (PHENERGAN WITH CODEINE) 6 25-10 mg/5 mL syrup, Take 5 mL by mouth every 4 (four) hours as needed for cough, Disp: 240 mL, Rfl: 0    topiramate (TOPAMAX) 100 mg tablet, 200 mg daily, Disp: , Rfl:     traZODone (DESYREL) 100 mg tablet, Take 50 mg by mouth daily at bedtime as needed, Disp: , Rfl:     VYVANSE 30 MG capsule, Take 60 mg by mouth as needed , Disp: , Rfl: 0    No Known Allergies    Physical Exam:    /87 (BP Location: Left arm, Patient Position: Sitting, Cuff Size: Standard)   Pulse 75   Ht 5' 8" (1 727 m)   Wt 103 kg (227 lb 12 8 oz)   BMI 34 64 kg/m²     Constitutional: normal, well developed, well nourished, alert, in no distress and non-toxic and no overt pain behavior    Eyes: anicteric  HEENT: grossly intact  Neck: supple, symmetric, trachea midline and no masses   Pulmonary:even and unlabored  Cardiovascular:No edema or pitting edema present  Skin:Normal without rashes or lesions and well hydrated  Psychiatric:Mood and affect appropriate  Neurologic:Cranial Nerves II-XII grossly intact  Musculoskeletal:normal     Lumbar Spine Exam    Appearance:  Normal lordosis  Palpation/Tenderness:  left sacroiliac joint tenderness  right sacroiliac joint tenderness  Sensory:  no sensory deficits noted  Range of Motion:  Flexion:  No limitation  without pain  Extension:  No limitation without pain  Motor Strength:  Left hip flexion:  5/5  Left hip extension:  5/5  Right hip flexion:  5/5  Right hip extension:  5/5  Left knee flexion:  5/5  Left knee extension:  5/5  Right knee flexion:  5/5  Right knee extension:  5/5  Left foot dorsiflexion:  5/5  Left foot plantar flexion:  5/5  Right foot dorsiflexion:  5/5  Right foot plantar flexion:  5/5  Reflexes:  Left Patellar:  2+   Right Patellar:  2+   Left Achilles:  2+   Right Achilles:  2+   Special Tests:  Left Pio's Maneuver:  positive  Right Pio's Maneuver:  positive  Left Gaenslen's Test:  positive  Right Gaenslen's Test:  positive  Left Pelvic Distraction Test:  positive  Right Pelvic Distraction Test:  positive  Lumbar facet loading L>R    Imaging  LUMBAR SPINE  7/28/2022     INDICATION:   M54 9: Dorsalgia, unspecified        COMPARISON:  None     VIEWS:  XR SPINE LUMBAR 2 OR 3 VIEWS INJURY  Images: 3     FINDINGS:     There are 5 non rib bearing lumbar vertebral bodies       There is no evidence of acute fracture or destructive osseous lesion      Grade I spondylolithesis L4 on L5       Intervertebral disc space narrowing at L4-5 and L5-S1 with endplate osteophytes      The pedicles appear intact  Facet arthropathy at L4-5 and L5-S1      Soft tissues are unremarkable      IMPRESSION:     Mild degenerative changes of lumbar spine    FL spine and pain procedure    (Results Pending)       Orders Placed This Encounter   Procedures    FL spine and pain procedure

## 2022-09-08 ENCOUNTER — TELEPHONE (OUTPATIENT)
Dept: PSYCHIATRY | Facility: CLINIC | Age: 47
End: 2022-09-08

## 2022-09-08 DIAGNOSIS — M46.1 SACROILIITIS (HCC): ICD-10-CM

## 2022-09-08 RX ORDER — IBUPROFEN 800 MG/1
800 TABLET ORAL EVERY 8 HOURS PRN
Qty: 90 TABLET | Refills: 2 | Status: SHIPPED | OUTPATIENT
Start: 2022-09-08 | End: 2022-10-20 | Stop reason: CLARIF

## 2022-09-11 DIAGNOSIS — M54.50 LOW BACK PAIN, UNSPECIFIED BACK PAIN LATERALITY, UNSPECIFIED CHRONICITY, UNSPECIFIED WHETHER SCIATICA PRESENT: ICD-10-CM

## 2022-09-11 RX ORDER — GABAPENTIN 100 MG/1
CAPSULE ORAL
Qty: 60 CAPSULE | Refills: 2 | Status: SHIPPED | OUTPATIENT
Start: 2022-09-11 | End: 2022-10-20 | Stop reason: CLARIF

## 2022-10-19 ENCOUNTER — APPOINTMENT (OUTPATIENT)
Dept: LAB | Facility: CLINIC | Age: 47
End: 2022-10-19
Payer: COMMERCIAL

## 2022-10-19 DIAGNOSIS — E78.5 DYSLIPIDEMIA: ICD-10-CM

## 2022-10-19 DIAGNOSIS — E87.6 LOW BLOOD POTASSIUM: ICD-10-CM

## 2022-10-19 DIAGNOSIS — R73.01 IFG (IMPAIRED FASTING GLUCOSE): ICD-10-CM

## 2022-10-19 DIAGNOSIS — R74.8 ELEVATED LIVER ENZYMES: ICD-10-CM

## 2022-10-19 LAB
ALBUMIN SERPL BCP-MCNC: 4.1 G/DL (ref 3.5–5)
ALP SERPL-CCNC: 67 U/L (ref 46–116)
ALT SERPL W P-5'-P-CCNC: 24 U/L (ref 12–78)
ANION GAP SERPL CALCULATED.3IONS-SCNC: 9 MMOL/L (ref 4–13)
AST SERPL W P-5'-P-CCNC: 15 U/L (ref 5–45)
BASOPHILS # BLD AUTO: 0.04 THOUSANDS/ÂΜL (ref 0–0.1)
BASOPHILS NFR BLD AUTO: 1 % (ref 0–1)
BILIRUB DIRECT SERPL-MCNC: 0.16 MG/DL (ref 0–0.2)
BILIRUB SERPL-MCNC: 0.53 MG/DL (ref 0.2–1)
BUN SERPL-MCNC: 14 MG/DL (ref 5–25)
CALCIUM SERPL-MCNC: 10.2 MG/DL (ref 8.3–10.1)
CHLORIDE SERPL-SCNC: 108 MMOL/L (ref 96–108)
CHOLEST SERPL-MCNC: 194 MG/DL
CO2 SERPL-SCNC: 21 MMOL/L (ref 21–32)
CREAT SERPL-MCNC: 0.84 MG/DL (ref 0.6–1.3)
EOSINOPHIL # BLD AUTO: 0.09 THOUSAND/ÂΜL (ref 0–0.61)
EOSINOPHIL NFR BLD AUTO: 1 % (ref 0–6)
ERYTHROCYTE [DISTWIDTH] IN BLOOD BY AUTOMATED COUNT: 13.6 % (ref 11.6–15.1)
GFR SERPL CREATININE-BSD FRML MDRD: 82 ML/MIN/1.73SQ M
GLUCOSE P FAST SERPL-MCNC: 80 MG/DL (ref 65–99)
HCT VFR BLD AUTO: 44.4 % (ref 34.8–46.1)
HDLC SERPL-MCNC: 47 MG/DL
HGB BLD-MCNC: 14.4 G/DL (ref 11.5–15.4)
IMM GRANULOCYTES # BLD AUTO: 0.02 THOUSAND/UL (ref 0–0.2)
IMM GRANULOCYTES NFR BLD AUTO: 0 % (ref 0–2)
LDLC SERPL CALC-MCNC: 127 MG/DL (ref 0–100)
LYMPHOCYTES # BLD AUTO: 1.55 THOUSANDS/ÂΜL (ref 0.6–4.47)
LYMPHOCYTES NFR BLD AUTO: 21 % (ref 14–44)
MCH RBC QN AUTO: 30.6 PG (ref 26.8–34.3)
MCHC RBC AUTO-ENTMCNC: 32.4 G/DL (ref 31.4–37.4)
MCV RBC AUTO: 94 FL (ref 82–98)
MONOCYTES # BLD AUTO: 0.46 THOUSAND/ÂΜL (ref 0.17–1.22)
MONOCYTES NFR BLD AUTO: 6 % (ref 4–12)
NEUTROPHILS # BLD AUTO: 5.2 THOUSANDS/ÂΜL (ref 1.85–7.62)
NEUTS SEG NFR BLD AUTO: 71 % (ref 43–75)
NONHDLC SERPL-MCNC: 147 MG/DL
NRBC BLD AUTO-RTO: 0 /100 WBCS
PLATELET # BLD AUTO: 319 THOUSANDS/UL (ref 149–390)
PMV BLD AUTO: 11.9 FL (ref 8.9–12.7)
POTASSIUM SERPL-SCNC: 3.8 MMOL/L (ref 3.5–5.3)
PROT SERPL-MCNC: 8.1 G/DL (ref 6.4–8.4)
RBC # BLD AUTO: 4.71 MILLION/UL (ref 3.81–5.12)
SODIUM SERPL-SCNC: 138 MMOL/L (ref 135–147)
TRIGL SERPL-MCNC: 100 MG/DL
TSH SERPL DL<=0.05 MIU/L-ACNC: 1.51 UIU/ML (ref 0.45–4.5)
WBC # BLD AUTO: 7.36 THOUSAND/UL (ref 4.31–10.16)

## 2022-10-19 PROCEDURE — 84443 ASSAY THYROID STIM HORMONE: CPT

## 2022-10-19 PROCEDURE — 83036 HEMOGLOBIN GLYCOSYLATED A1C: CPT

## 2022-10-19 PROCEDURE — 80053 COMPREHEN METABOLIC PANEL: CPT

## 2022-10-19 PROCEDURE — 85025 COMPLETE CBC W/AUTO DIFF WBC: CPT

## 2022-10-19 PROCEDURE — 80061 LIPID PANEL: CPT

## 2022-10-19 PROCEDURE — 36415 COLL VENOUS BLD VENIPUNCTURE: CPT

## 2022-10-19 PROCEDURE — 82248 BILIRUBIN DIRECT: CPT

## 2022-10-20 ENCOUNTER — OFFICE VISIT (OUTPATIENT)
Dept: INTERNAL MEDICINE CLINIC | Facility: CLINIC | Age: 47
End: 2022-10-20
Payer: COMMERCIAL

## 2022-10-20 VITALS
SYSTOLIC BLOOD PRESSURE: 122 MMHG | WEIGHT: 216.4 LBS | HEART RATE: 104 BPM | BODY MASS INDEX: 32.8 KG/M2 | RESPIRATION RATE: 16 BRPM | HEIGHT: 68 IN | DIASTOLIC BLOOD PRESSURE: 90 MMHG

## 2022-10-20 DIAGNOSIS — F90.2 ATTENTION DEFICIT HYPERACTIVITY DISORDER (ADHD), COMBINED TYPE: ICD-10-CM

## 2022-10-20 DIAGNOSIS — E83.52 SERUM CALCIUM ELEVATED: Primary | ICD-10-CM

## 2022-10-20 DIAGNOSIS — F41.9 ANXIETY: ICD-10-CM

## 2022-10-20 DIAGNOSIS — G47.33 OBSTRUCTIVE SLEEP APNEA: ICD-10-CM

## 2022-10-20 DIAGNOSIS — R73.01 IFG (IMPAIRED FASTING GLUCOSE): ICD-10-CM

## 2022-10-20 DIAGNOSIS — E78.5 DYSLIPIDEMIA: ICD-10-CM

## 2022-10-20 DIAGNOSIS — Z00.00 PHYSICAL EXAM: ICD-10-CM

## 2022-10-20 DIAGNOSIS — E66.9 CLASS 1 OBESITY WITHOUT SERIOUS COMORBIDITY IN ADULT, UNSPECIFIED BMI, UNSPECIFIED OBESITY TYPE: ICD-10-CM

## 2022-10-20 DIAGNOSIS — F43.10 POST TRAUMATIC STRESS DISORDER (PTSD): ICD-10-CM

## 2022-10-20 LAB
EST. AVERAGE GLUCOSE BLD GHB EST-MCNC: 103 MG/DL
HBA1C MFR BLD: 5.2 %

## 2022-10-20 PROCEDURE — 99396 PREV VISIT EST AGE 40-64: CPT | Performed by: NURSE PRACTITIONER

## 2022-10-20 NOTE — PROGRESS NOTES
INTERNAL MEDICINE FOLLOW-UP VISIT  [facility]    NAME: Jesu Rene  AGE: 49 y o  SEX: female  : 1975     DATE: 10/20/2022     Assessment and Plan:   1  Serum calcium elevated  Likely hydration continue to increase water    2  Dyslipidemia  Markedly improved with diet exercise and weight loss  - Ambulatory Referral to Nutrition Services; Future  - CBC and differential; Future  - Comprehensive metabolic panel; Future  - Lipid panel; Future  - TSH, 3rd generation with Free T4 reflex; Future    3  Class 1 obesity without serious comorbidity in adult, unspecified BMI, unspecified obesity type  Continue w/ weight loss efforts    4  IFG (impaired fasting glucose)  - Hemoglobin A1C; Future    5  Obstructive sleep apnea  Will try to get new machine     6  Anxiety  7  Attention deficit hyperactivity disorder (ADHD), combined type  8  Post traumatic stress disorder (PTSD)  Following w/ psychiatry    9  Sacroiliitis-   Will reach out to pain management about appt for injection          No follow-ups on file  Chief Complaint:     No chief complaint on file  History of Present Illness:     Still w/ right SI joint  Following w pain and chiro- chiro doesn't want her to  Do PT   ADHD is still terrible   Feeling much better w/ motivation on cymbalta  Derm removed cyst to back - has 5 sutured  Missed gyn  Thinks she needs cpap changed w/ weight loss    The following portions of the patient's history were reviewed and updated as appropriate: allergies, current medications, past family history, past medical history, past social history, past surgical history and problem list      Review of Systems:     Review of Systems   Constitutional: Negative for appetite change, chills, diaphoresis, fatigue, fever and unexpected weight change  HENT: Negative for postnasal drip and sneezing  Eyes: Negative for visual disturbance     Respiratory: Negative for chest tightness and shortness of breath  Cardiovascular: Negative for chest pain, palpitations and leg swelling  Gastrointestinal: Negative for abdominal pain and blood in stool  Endocrine: Negative for cold intolerance, heat intolerance, polydipsia, polyphagia and polyuria  Genitourinary: Negative for difficulty urinating, dysuria, frequency and urgency  Musculoskeletal: Negative for arthralgias and myalgias  Skin: Negative for rash and wound  Neurological: Negative for dizziness, weakness, light-headedness and headaches  Hematological: Negative for adenopathy  Psychiatric/Behavioral: Positive for decreased concentration  Negative for confusion, dysphoric mood and sleep disturbance  The patient is nervous/anxious and is hyperactive  Past Medical History:     Past Medical History:   Diagnosis Date   • Anxiety    • Chronic post-traumatic stress disorder (PTSD)    • Hypertension    • Obesity         Current Medications:     Current Outpatient Medications:   •  ALPRAZolam (XANAX) 1 mg tablet, Take 1 mg by mouth daily as needed for anxiety PRN, Disp: , Rfl: 0  •  Lemborexant (DayVigo) 5 MG TABS, Take 5 mg combined by mouth daily at bedtime as needed, Disp: , Rfl:   •  topiramate (TOPAMAX) 100 mg tablet, 200 mg daily, Disp: , Rfl:   •  traZODone (DESYREL) 100 mg tablet, Take 50 mg by mouth daily at bedtime as needed, Disp: , Rfl:   •  VYVANSE 30 MG capsule, Take 60 mg by mouth as needed , Disp: , Rfl: 0     Allergies:   No Known Allergies     Physical Exam:     /90 (BP Location: Left arm, Patient Position: Sitting, Cuff Size: Standard)   Pulse 104   Resp 16   Ht 5' 8" (1 727 m)   Wt 98 2 kg (216 lb 6 4 oz)   BMI 32 90 kg/m²     Physical Exam  Constitutional:       Appearance: She is well-developed  HENT:      Head: Normocephalic and atraumatic  Eyes:      Pupils: Pupils are equal, round, and reactive to light  Neck:      Thyroid: No thyromegaly     Cardiovascular:      Rate and Rhythm: Normal rate and regular rhythm  Heart sounds: No murmur heard  Pulmonary:      Effort: Pulmonary effort is normal       Breath sounds: Normal breath sounds  Abdominal:      General: Bowel sounds are normal       Palpations: Abdomen is soft  Musculoskeletal:         General: Normal range of motion  Cervical back: Normal range of motion and neck supple  Lymphadenopathy:      Cervical: No cervical adenopathy  Skin:     General: Skin is warm and dry  Neurological:      Mental Status: She is alert and oriented to person, place, and time  Data:     Laboratory Results: I have personally reviewed the pertinent laboratory results/reports   Radiology/Other Diagnostic Testing Results: I have personally reviewed pertinent reports         Aide Crawford

## 2022-10-21 ENCOUNTER — TELEPHONE (OUTPATIENT)
Dept: RADIOLOGY | Facility: CLINIC | Age: 47
End: 2022-10-21

## 2022-10-21 NOTE — TELEPHONE ENCOUNTER
----- Message from Mark Max MD sent at 10/20/2022  3:46 PM EDT -----  This may be when you were out  Could you please look into this when you get a chance? Thanks! !  ----- Message -----  From: TORI Olvera  Sent: 10/20/2022   3:42 PM EDT  To: Mark Max MD    Pt never received call to set up injection, can you have your staff call her   thanks

## 2022-10-26 LAB

## 2022-11-08 ENCOUNTER — PATIENT MESSAGE (OUTPATIENT)
Dept: INTERNAL MEDICINE CLINIC | Facility: CLINIC | Age: 47
End: 2022-11-08

## 2022-11-10 ENCOUNTER — OFFICE VISIT (OUTPATIENT)
Dept: INTERNAL MEDICINE CLINIC | Facility: CLINIC | Age: 47
End: 2022-11-10

## 2022-11-10 VITALS
RESPIRATION RATE: 16 BRPM | BODY MASS INDEX: 36.83 KG/M2 | HEART RATE: 78 BPM | SYSTOLIC BLOOD PRESSURE: 124 MMHG | HEIGHT: 68 IN | DIASTOLIC BLOOD PRESSURE: 84 MMHG | WEIGHT: 243 LBS

## 2022-11-10 DIAGNOSIS — R73.03 PREDIABETES: Primary | ICD-10-CM

## 2022-11-10 DIAGNOSIS — Z48.02 VISIT FOR SUTURE REMOVAL: ICD-10-CM

## 2022-11-10 RX ORDER — DULOXETIN HYDROCHLORIDE 60 MG/1
120 CAPSULE, DELAYED RELEASE ORAL DAILY
COMMUNITY
Start: 2022-11-03

## 2022-11-10 NOTE — PROGRESS NOTES
INTERNAL MEDICINE FOLLOW-UP VISIT  [facility] - [facility]    NAME: Gene Villa  AGE: 49 y o  SEX: female  : 1975     DATE: 11/10/2022     Assessment and Plan:   1  Prediabetes  Discussed diet exercise and weight loss, if this is not covered consider going back on topamax  - Semaglutide,0 25 or 0 5MG/DOS, 2 MG/1 5ML SOPN; Inject 0 25 mg under the skin once a week  Dispense: 2 mL; Refill: 2    2  Visit for suture removal  2 sutures removed without difficulties          No follow-ups on file  Chief Complaint:     Chief Complaint   Patient presents with   • Suture / Staple Removal   • Follow-up     Stopped taking topamax  History of Present Illness:     Had cyst removed from back about 3 weeks ago and never followed back up w/ derm to have it removed  Needs them out  Stopped tomapax and she cant stop eating now  ? Other medication to help  The following portions of the patient's history were reviewed and updated as appropriate: allergies, current medications, past family history, past medical history, past social history, past surgical history and problem list      Review of Systems:     Review of Systems   Constitutional: Positive for unexpected weight change  Negative for appetite change, chills, diaphoresis, fatigue and fever  HENT: Negative for postnasal drip and sneezing  Eyes: Negative for visual disturbance  Respiratory: Negative for chest tightness and shortness of breath  Cardiovascular: Negative for chest pain, palpitations and leg swelling  Gastrointestinal: Negative for abdominal pain and blood in stool  Endocrine: Negative for cold intolerance, heat intolerance, polydipsia, polyphagia and polyuria  Genitourinary: Negative for difficulty urinating, dysuria, frequency and urgency  Musculoskeletal: Negative for arthralgias and myalgias  Skin: Negative for rash and wound     Neurological: Negative for dizziness, weakness, light-headedness and headaches  Hematological: Negative for adenopathy  Psychiatric/Behavioral: Negative for confusion, dysphoric mood and sleep disturbance  The patient is not nervous/anxious  Past Medical History:     Past Medical History:   Diagnosis Date   • Anxiety    • Chronic post-traumatic stress disorder (PTSD)    • Hypertension    • Obesity         Current Medications:     Current Outpatient Medications:   •  ALPRAZolam (XANAX) 1 mg tablet, Take 1 mg by mouth daily as needed for anxiety PRN, Disp: , Rfl: 0  •  DULoxetine (CYMBALTA) 60 mg delayed release capsule, Take 120 mg by mouth daily, Disp: , Rfl:   •  Lemborexant (DayVigo) 5 MG TABS, Take 5 mg combined by mouth daily at bedtime as needed, Disp: , Rfl:   •  Semaglutide,0 25 or 0 5MG/DOS, 2 MG/1 5ML SOPN, Inject 0 25 mg under the skin once a week, Disp: 2 mL, Rfl: 2  •  traZODone (DESYREL) 100 mg tablet, Take 50 mg by mouth daily at bedtime as needed, Disp: , Rfl:   •  VYVANSE 30 MG capsule, Take 60 mg by mouth as needed , Disp: , Rfl: 0     Allergies:   No Known Allergies     Physical Exam:     /84 (BP Location: Left arm, Patient Position: Sitting, Cuff Size: Standard)   Pulse 78   Resp 16   Ht 5' 8" (1 727 m)   Wt 110 kg (243 lb)   BMI 36 95 kg/m²     Physical Exam  Constitutional:       Appearance: She is well-developed  HENT:      Head: Normocephalic and atraumatic  Eyes:      Pupils: Pupils are equal, round, and reactive to light  Pulmonary:      Effort: Pulmonary effort is normal    Skin:     Comments: Incision to back w/ 2 sutures    Neurological:      Mental Status: She is alert and oriented to person, place, and time  Data:     Laboratory Results: I have personally reviewed the pertinent laboratory results/reports   Radiology/Other Diagnostic Testing Results: I have personally reviewed pertinent reports        TORI Landsi  [facility]

## 2023-03-28 ENCOUNTER — TELEPHONE (OUTPATIENT)
Dept: INTERNAL MEDICINE CLINIC | Facility: CLINIC | Age: 48
End: 2023-03-28

## 2023-03-28 DIAGNOSIS — R73.03 PREDIABETES: ICD-10-CM

## 2023-03-28 RX ORDER — SEMAGLUTIDE 1.34 MG/ML
INJECTION, SOLUTION SUBCUTANEOUS
Qty: 4.5 ML | Refills: 2 | Status: SHIPPED | OUTPATIENT
Start: 2023-03-28 | End: 2023-03-29 | Stop reason: ALTCHOICE

## 2023-03-28 NOTE — TELEPHONE ENCOUNTER
----- Message from Dorrie Schlatter, MD sent at 3/28/2023 12:29 PM EDT -----  Regarding: FW: Mejia Zee: [phone]  Please arrange follow-up with Brook Tejeda or another provider to address      ----- Message -----  From: Tami Lott LPN  Sent: 1/27/1908  12:00 PM EDT  To: Dorrie Schlatter, MD  Subject: FW: Kalli Krueger                                        ----- Message -----  From: Berta Carrillo  Sent: 3/28/2023  11:45 AM EDT  To: [facility]  Subject: Angelica Kay - I called to schedule a follow up with Carroll Gallardo to discuss Ozempic and learned that she left the practice  I made an appt  w/ you but you are scheduling out into June  I have been using   25mg Ozempic weekly since it was prescribed to me and have not lost any weight  (Please note I have lost 65lbs on my own accord and understand that I need to consume less that I burn ) I am out of refills on the ozempic and was going to discontinue however last Wednesday I did   5mg on the injection and noticed it has curbed my appetite more w/ minimal side effects  My 5k deductible returns on April 1st so I would love to pick meds before then  Would you call in an increased dosage of ozempic for me? or do you feel I should switch to [facility]MALINDA RASMUSSEN? or simply return to natural weight loss? Let me know, thanks

## 2023-03-29 ENCOUNTER — OFFICE VISIT (OUTPATIENT)
Dept: INTERNAL MEDICINE CLINIC | Facility: CLINIC | Age: 48
End: 2023-03-29

## 2023-03-29 ENCOUNTER — APPOINTMENT (OUTPATIENT)
Dept: LAB | Facility: CLINIC | Age: 48
End: 2023-03-29

## 2023-03-29 VITALS
OXYGEN SATURATION: 98 % | HEART RATE: 105 BPM | BODY MASS INDEX: 32.16 KG/M2 | HEIGHT: 68 IN | TEMPERATURE: 97 F | SYSTOLIC BLOOD PRESSURE: 116 MMHG | WEIGHT: 212.2 LBS | DIASTOLIC BLOOD PRESSURE: 82 MMHG

## 2023-03-29 DIAGNOSIS — R73.01 IFG (IMPAIRED FASTING GLUCOSE): ICD-10-CM

## 2023-03-29 DIAGNOSIS — E66.9 CLASS 1 OBESITY WITHOUT SERIOUS COMORBIDITY WITH BODY MASS INDEX (BMI) OF 32.0 TO 32.9 IN ADULT, UNSPECIFIED OBESITY TYPE: ICD-10-CM

## 2023-03-29 DIAGNOSIS — E78.5 DYSLIPIDEMIA: ICD-10-CM

## 2023-03-29 DIAGNOSIS — R82.90 MALODOROUS URINE: ICD-10-CM

## 2023-03-29 DIAGNOSIS — E66.9 CLASS 1 OBESITY WITHOUT SERIOUS COMORBIDITY WITH BODY MASS INDEX (BMI) OF 32.0 TO 32.9 IN ADULT, UNSPECIFIED OBESITY TYPE: Primary | ICD-10-CM

## 2023-03-29 DIAGNOSIS — R30.0 DYSURIA: ICD-10-CM

## 2023-03-29 NOTE — PROGRESS NOTES
Name: Salud Hodgson      : 1975      MRN: [id number]  Encounter Provider: TORI Ayala  Encounter Date: 3/29/2023   Encounter department: [facility] [address]       1  Class 1 obesity without serious comorbidity with body mass index (BMI) of 32 0 to 32 9 in adult, unspecified obesity type  Started on Ozempic 0 25 in November, patient believes there is been no significant weight loss on this current dose  She tried 0 5 mg last week, reported noticeable change in satiety  She is interested in trying Wegovy, titrate every 4 weeks  She will get blood work previously ordered to check kidney function and TSH  Denies personal or family history of thyroid cancer     - Semaglutide-Weight Management (WEGOVY) 0 25 MG/0 5ML; Inject 0 5 mL (0 25 mg total) under the skin once a week for 4 doses  Dispense: 2 mL; Refill: 0    2  Malodorous urine  Reports malodorous urine, no discharge, burning or frequency  Increase hydration  - UA w Reflex to Microscopic w Reflex to Culture -Lab Collect; Future  - Urine culture; Future    Subjective      Rayne Amin is here to discuss medication for weight management  She was started on Ozempic 2022  Reports she has been taking 0 25 mg weekly without change in weight  Reports changing her diet decreasing nighttime eating  No formal exercise at this time but considering exercise with elliptical in the near future  Review of Systems   Constitutional: Negative  Negative for activity change and appetite change  HENT: Negative  Eyes: Negative  Respiratory: Negative  Cardiovascular: Negative  Gastrointestinal: Positive for nausea (Secondary to Ozempic)  Genitourinary: Negative for dysuria, flank pain, hematuria, urgency, vaginal discharge and vaginal pain  Malodorous urine   Neurological: Negative          Current Outpatient Medications on File Prior to Visit   Medication Sig   • ALPRAZolam (XANAX) 1 mg "tablet Take 1 mg by mouth daily as needed for anxiety PRN   • DULoxetine (CYMBALTA) 60 mg delayed release capsule Take 120 mg by mouth daily   • traZODone (DESYREL) 100 mg tablet Take 50 mg by mouth daily at bedtime as needed Per pt has 100 mg tablets and ton take 200mg   • VYVANSE 30 MG capsule Take 60 mg by mouth as needed    • [DISCONTINUED] Ozempic, 0 25 or 0 5 MG/DOSE, 2 MG/1 5ML injection pen INJECT 0 25 MG UNDER THE SKIN ONCE A WEEK   • Lemborexant (DayVigo) 5 MG TABS Take 5 mg combined by mouth daily at bedtime as needed       Objective     /82   Pulse 105   Temp (!) 97 °F (36 1 °C)   Ht 5' 8\" (1 727 m)   Wt 96 3 kg (212 lb 3 2 oz)   SpO2 98%   BMI 32 26 kg/m²     Physical Exam  Vitals reviewed  Constitutional:       Appearance: Normal appearance  HENT:      Head: Normocephalic and atraumatic  Eyes:      Extraocular Movements: Extraocular movements intact  Pupils: Pupils are equal, round, and reactive to light  Cardiovascular:      Rate and Rhythm: Normal rate and regular rhythm  Pulses: Normal pulses  Heart sounds: Normal heart sounds  Pulmonary:      Effort: Pulmonary effort is normal       Breath sounds: Normal breath sounds  Abdominal:      Palpations: Abdomen is soft  Musculoskeletal:         General: Normal range of motion  Cervical back: Normal range of motion and neck supple  Skin:     General: Skin is warm and dry  Neurological:      General: No focal deficit present  Mental Status: She is alert and oriented to person, place, and time     Psychiatric:         Mood and Affect: Mood normal          Behavior: Behavior normal        TORI Bolton  "

## 2023-03-30 ENCOUNTER — TELEPHONE (OUTPATIENT)
Dept: INTERNAL MEDICINE CLINIC | Facility: CLINIC | Age: 48
End: 2023-03-30

## 2023-03-30 DIAGNOSIS — E66.9 CLASS 1 OBESITY WITHOUT SERIOUS COMORBIDITY WITH BODY MASS INDEX (BMI) OF 32.0 TO 32.9 IN ADULT, UNSPECIFIED OBESITY TYPE: ICD-10-CM

## 2023-03-30 DIAGNOSIS — N30.90 CYSTITIS: Primary | ICD-10-CM

## 2023-03-30 LAB
ALBUMIN SERPL BCP-MCNC: 4.1 G/DL (ref 3.5–5)
ALP SERPL-CCNC: 47 U/L (ref 46–116)
ALT SERPL W P-5'-P-CCNC: 17 U/L (ref 12–78)
AMORPH URATE CRY URNS QL MICRO: ABNORMAL
ANION GAP SERPL CALCULATED.3IONS-SCNC: 4 MMOL/L (ref 4–13)
AST SERPL W P-5'-P-CCNC: 9 U/L (ref 5–45)
BACTERIA UR QL AUTO: ABNORMAL /HPF
BASOPHILS # BLD AUTO: 0.02 THOUSANDS/ÂΜL (ref 0–0.1)
BASOPHILS NFR BLD AUTO: 0 % (ref 0–1)
BILIRUB SERPL-MCNC: 0.5 MG/DL (ref 0.2–1)
BILIRUB UR QL STRIP: NEGATIVE
BUN SERPL-MCNC: 16 MG/DL (ref 5–25)
CALCIUM SERPL-MCNC: 10.6 MG/DL (ref 8.3–10.1)
CAOX CRY URNS QL MICRO: ABNORMAL /HPF
CHLORIDE SERPL-SCNC: 110 MMOL/L (ref 96–108)
CHOLEST SERPL-MCNC: 168 MG/DL
CLARITY UR: ABNORMAL
CO2 SERPL-SCNC: 25 MMOL/L (ref 21–32)
COLOR UR: YELLOW
CREAT SERPL-MCNC: 0.87 MG/DL (ref 0.6–1.3)
EOSINOPHIL # BLD AUTO: 0.13 THOUSAND/ÂΜL (ref 0–0.61)
EOSINOPHIL NFR BLD AUTO: 2 % (ref 0–6)
ERYTHROCYTE [DISTWIDTH] IN BLOOD BY AUTOMATED COUNT: 14.1 % (ref 11.6–15.1)
GFR SERPL CREATININE-BSD FRML MDRD: 79 ML/MIN/1.73SQ M
GLUCOSE P FAST SERPL-MCNC: 88 MG/DL (ref 65–99)
GLUCOSE UR STRIP-MCNC: NEGATIVE MG/DL
HCT VFR BLD AUTO: 42.6 % (ref 34.8–46.1)
HDLC SERPL-MCNC: 52 MG/DL
HGB BLD-MCNC: 13.2 G/DL (ref 11.5–15.4)
HGB UR QL STRIP.AUTO: NEGATIVE
HYALINE CASTS #/AREA URNS LPF: ABNORMAL /LPF
IMM GRANULOCYTES # BLD AUTO: 0.02 THOUSAND/UL (ref 0–0.2)
IMM GRANULOCYTES NFR BLD AUTO: 0 % (ref 0–2)
KETONES UR STRIP-MCNC: NEGATIVE MG/DL
LDLC SERPL CALC-MCNC: 93 MG/DL (ref 0–100)
LEUKOCYTE ESTERASE UR QL STRIP: ABNORMAL
LYMPHOCYTES # BLD AUTO: 1.52 THOUSANDS/ÂΜL (ref 0.6–4.47)
LYMPHOCYTES NFR BLD AUTO: 24 % (ref 14–44)
MCH RBC QN AUTO: 29.4 PG (ref 26.8–34.3)
MCHC RBC AUTO-ENTMCNC: 31 G/DL (ref 31.4–37.4)
MCV RBC AUTO: 95 FL (ref 82–98)
MONOCYTES # BLD AUTO: 0.41 THOUSAND/ÂΜL (ref 0.17–1.22)
MONOCYTES NFR BLD AUTO: 6 % (ref 4–12)
MUCOUS THREADS UR QL AUTO: ABNORMAL
NEUTROPHILS # BLD AUTO: 4.36 THOUSANDS/ÂΜL (ref 1.85–7.62)
NEUTS SEG NFR BLD AUTO: 68 % (ref 43–75)
NITRITE UR QL STRIP: NEGATIVE
NON-SQ EPI CELLS URNS QL MICRO: ABNORMAL /HPF
NONHDLC SERPL-MCNC: 116 MG/DL
NRBC BLD AUTO-RTO: 0 /100 WBCS
PH UR STRIP.AUTO: 5.5 [PH]
PLATELET # BLD AUTO: 288 THOUSANDS/UL (ref 149–390)
PMV BLD AUTO: 12 FL (ref 8.9–12.7)
POTASSIUM SERPL-SCNC: 4.1 MMOL/L (ref 3.5–5.3)
PROT SERPL-MCNC: 7.4 G/DL (ref 6.4–8.4)
PROT UR STRIP-MCNC: ABNORMAL MG/DL
RBC # BLD AUTO: 4.49 MILLION/UL (ref 3.81–5.12)
RBC #/AREA URNS AUTO: ABNORMAL /HPF
SODIUM SERPL-SCNC: 139 MMOL/L (ref 135–147)
SP GR UR STRIP.AUTO: 1.02 (ref 1–1.03)
TRIGL SERPL-MCNC: 115 MG/DL
TSH SERPL DL<=0.05 MIU/L-ACNC: 0.77 UIU/ML (ref 0.45–4.5)
UROBILINOGEN UR STRIP-ACNC: <2 MG/DL
WBC # BLD AUTO: 6.46 THOUSAND/UL (ref 4.31–10.16)
WBC #/AREA URNS AUTO: ABNORMAL /HPF

## 2023-03-30 RX ORDER — SEMAGLUTIDE 0.25 MG/.5ML
0.25 INJECTION, SOLUTION SUBCUTANEOUS WEEKLY
Qty: 2 ML | Refills: 0 | Status: SHIPPED | OUTPATIENT
Start: 2023-03-30 | End: 2023-04-01

## 2023-03-30 RX ORDER — SULFAMETHOXAZOLE AND TRIMETHOPRIM 800; 160 MG/1; MG/1
1 TABLET ORAL 2 TIMES DAILY
Qty: 6 TABLET | Refills: 0 | Status: SHIPPED | OUTPATIENT
Start: 2023-03-30 | End: 2023-04-02

## 2023-03-30 NOTE — TELEPHONE ENCOUNTER
I was waiting on the culture and sensitivity to return  That will tell me what antibiotic is effective  I can start her on bactrim but may have to change antibiotics based on the culture

## 2023-03-30 NOTE — TELEPHONE ENCOUNTER
Was seen yesterday, sees in [place] that she has a UTI    Calling to see if she will be getting meds for this?

## 2023-03-31 DIAGNOSIS — N30.90 CYSTITIS: Primary | ICD-10-CM

## 2023-03-31 LAB
BACTERIA UR CULT: ABNORMAL
BACTERIA UR CULT: NORMAL
EST. AVERAGE GLUCOSE BLD GHB EST-MCNC: 97 MG/DL
HBA1C MFR BLD: 5 %

## 2023-04-01 RX ORDER — SEMAGLUTIDE 0.25 MG/.5ML
0.25 INJECTION, SOLUTION SUBCUTANEOUS WEEKLY
Qty: 2 ML | Refills: 0 | Status: SHIPPED | OUTPATIENT
Start: 2023-04-01 | End: 2023-04-04

## 2023-04-03 ENCOUNTER — TELEPHONE (OUTPATIENT)
Age: 48
End: 2023-04-03

## 2023-04-03 DIAGNOSIS — E66.9 CLASS 1 OBESITY WITHOUT SERIOUS COMORBIDITY WITH BODY MASS INDEX (BMI) OF 32.0 TO 32.9 IN ADULT, UNSPECIFIED OBESITY TYPE: ICD-10-CM

## 2023-04-03 NOTE — TELEPHONE ENCOUNTER
Authorization denied on 04/04/23  Denial letter scanned in patients chart            PRIOR AUTH STARTED FOR PATIENT     (Key: [id number]) Wegovy 0 25MG/0 5ML auto-injectors Yes

## 2023-04-04 RX ORDER — SEMAGLUTIDE 0.25 MG/.5ML
0.25 INJECTION, SOLUTION SUBCUTANEOUS WEEKLY
Qty: 2 ML | Refills: 0 | Status: SHIPPED | OUTPATIENT
Start: 2023-04-04 | End: 2023-04-05 | Stop reason: ALTCHOICE

## 2023-04-05 ENCOUNTER — TELEPHONE (OUTPATIENT)
Age: 48
End: 2023-04-05

## 2023-04-05 DIAGNOSIS — R73.03 PREDIABETES: Primary | ICD-10-CM

## 2023-04-05 DIAGNOSIS — R31.29 OTHER MICROSCOPIC HEMATURIA: ICD-10-CM

## 2023-04-05 RX ORDER — TOPIRAMATE 100 MG/1
200 TABLET, FILM COATED ORAL DAILY
COMMUNITY
Start: 2023-02-23

## 2024-02-20 ENCOUNTER — HOSPITAL ENCOUNTER (OUTPATIENT)
Dept: RADIOLOGY | Facility: HOSPITAL | Age: 49
Discharge: HOME | End: 2024-02-20
Payer: COMMERCIAL

## 2024-02-20 ENCOUNTER — OFFICE VISIT (OUTPATIENT)
Age: 49
End: 2024-02-20
Payer: COMMERCIAL

## 2024-02-20 VITALS
OXYGEN SATURATION: 96 % | BODY MASS INDEX: 37.44 KG/M2 | TEMPERATURE: 98.4 F | SYSTOLIC BLOOD PRESSURE: 130 MMHG | HEIGHT: 68 IN | HEART RATE: 91 BPM | DIASTOLIC BLOOD PRESSURE: 82 MMHG | WEIGHT: 247 LBS

## 2024-02-20 DIAGNOSIS — Z82.41 FAMILY HISTORY OF SUDDEN CARDIAC DEATH: ICD-10-CM

## 2024-02-20 DIAGNOSIS — R73.01 IMPAIRED FASTING GLUCOSE: ICD-10-CM

## 2024-02-20 DIAGNOSIS — R05.2 SUBACUTE COUGH: ICD-10-CM

## 2024-02-20 DIAGNOSIS — R53.83 OTHER FATIGUE: ICD-10-CM

## 2024-02-20 DIAGNOSIS — F32.A DEPRESSION, UNSPECIFIED DEPRESSION TYPE: ICD-10-CM

## 2024-02-20 DIAGNOSIS — Z12.4 SCREENING FOR CERVICAL CANCER: ICD-10-CM

## 2024-02-20 DIAGNOSIS — Z13.0 SCREENING FOR DEFICIENCY ANEMIA: ICD-10-CM

## 2024-02-20 DIAGNOSIS — Z12.11 SCREENING FOR COLON CANCER: ICD-10-CM

## 2024-02-20 DIAGNOSIS — Z00.00 ANNUAL PHYSICAL EXAM: Primary | ICD-10-CM

## 2024-02-20 DIAGNOSIS — G89.29 CHRONIC LOW BACK PAIN, UNSPECIFIED BACK PAIN LATERALITY, UNSPECIFIED WHETHER SCIATICA PRESENT: ICD-10-CM

## 2024-02-20 DIAGNOSIS — Z13.220 SCREENING FOR LIPID DISORDERS: ICD-10-CM

## 2024-02-20 DIAGNOSIS — M54.50 CHRONIC LOW BACK PAIN, UNSPECIFIED BACK PAIN LATERALITY, UNSPECIFIED WHETHER SCIATICA PRESENT: ICD-10-CM

## 2024-02-20 DIAGNOSIS — G47.33 OBSTRUCTIVE SLEEP APNEA: ICD-10-CM

## 2024-02-20 DIAGNOSIS — Z11.59 NEED FOR HEPATITIS C SCREENING TEST: ICD-10-CM

## 2024-02-20 DIAGNOSIS — Z12.31 ENCOUNTER FOR SCREENING MAMMOGRAM FOR MALIGNANT NEOPLASM OF BREAST: ICD-10-CM

## 2024-02-20 PROBLEM — R06.09 DOE (DYSPNEA ON EXERTION): Status: RESOLVED | Noted: 2019-05-22 | Resolved: 2024-02-20

## 2024-02-20 PROBLEM — S83.289A TEAR OF LATERAL MENISCUS OF KNEE: Status: RESOLVED | Noted: 2019-02-25 | Resolved: 2024-02-20

## 2024-02-20 PROBLEM — F32.81 PREMENSTRUAL DYSPHORIC DISORDER: Status: RESOLVED | Noted: 2021-12-16 | Resolved: 2024-02-20

## 2024-02-20 PROBLEM — L65.9 HAIR LOSS: Status: ACTIVE | Noted: 2023-05-28

## 2024-02-20 PROBLEM — E28.2 POLYCYSTIC OVARIAN SYNDROME: Status: ACTIVE | Noted: 2023-05-28

## 2024-02-20 PROBLEM — N92.6 IRREGULAR PERIODS: Status: ACTIVE | Noted: 2023-05-28

## 2024-02-20 PROBLEM — D17.9 LIPOMA: Status: ACTIVE | Noted: 2023-05-28

## 2024-02-20 PROBLEM — N80.9 ENDOMETRIOSIS: Status: ACTIVE | Noted: 2023-05-28

## 2024-02-20 PROCEDURE — 99396 PREV VISIT EST AGE 40-64: CPT

## 2024-02-20 PROCEDURE — 71046 X-RAY EXAM CHEST 2 VIEWS: CPT

## 2024-02-20 RX ORDER — DEXTROMETHORPHAN HYDROBROMIDE AND PROMETHAZINE HYDROCHLORIDE 15; 6.25 MG/5ML; MG/5ML
5 SYRUP ORAL 4 TIMES DAILY PRN
Qty: 118 ML | Refills: 0 | Status: SHIPPED | OUTPATIENT
Start: 2024-02-20

## 2024-02-20 RX ORDER — ALBUTEROL SULFATE 90 UG/1
2 AEROSOL, METERED RESPIRATORY (INHALATION) EVERY 6 HOURS PRN
Qty: 18 G | Refills: 5 | Status: SHIPPED | OUTPATIENT
Start: 2024-02-20

## 2024-02-20 RX ORDER — HYDROXYZINE HYDROCHLORIDE 25 MG/1
25 TABLET, FILM COATED ORAL 2 TIMES DAILY
COMMUNITY
Start: 2024-02-13

## 2024-02-20 RX ORDER — ZOLPIDEM TARTRATE 10 MG/1
10 TABLET ORAL
COMMUNITY
Start: 2024-02-14

## 2024-02-20 NOTE — PROGRESS NOTES
INTERNAL MEDICINE FOLLOW-UP VISIT  [facility] - [facility] INTERNAL MEDICINE [address]    NAME: Mario Palm  AGE: 48 y.o. SEX: female  : 1975     DATE: 2024     Assessment and Plan:   1. Annual physical exam  She eats a well-balanced diet of fruits, vegetables, and lean meats. She doesn't drink an adequate amount of water, discussed she should be urinating clear to pale yellow urine every 2-3 hours. She just signed up for the [employer] to start swimming and has elliptical at home. She sleeps well.  She denies any alcohol, tobacco, or illicit drug use. She is UTD with dentist and eye doctor. She is a . She is  to her  Michael.     2. Obstructive sleep apnea  She uses CPAP nightly.     3. Depression, unspecified depression type  She sees a psychiatrist every 4 weeks and is starting with a new counselor.     4. Subacute cough  Cough syrup, and albuterol inhaler sent.  Discussed obtaining a chest x-ray to rule out any underlying inflammatory or infectious process.  She denies a history of pulmonary disease.  She denies any pulmonary disease.     5. Screening for colon cancer  Due for colonoscopy.    6. Encounter for screening mammogram for malignant neoplasm of breast  Due for mammogram.    8. Screening for cervical cancer  Due for Pap smear.    8. Chronic low back pain, unspecified back pain laterality, unspecified whether sciatica present  History of bulging disks in the lower lumbar region.  Discussed referral to physical therapy at this time in which she was agreeable.    10.  Family history of sudden cardiac death  She has a long line of family history of sudden cardiac death from heart attacks.  Her grandma  in her 30s from heart attack.  Referral to cardiology placed.      Depression Screening and Follow-up Plan: Patient was screened for depression during today's encounter. They screened negative with a PHQ-9 score of 0.       No follow-ups on file.        Chief Complaint:     Chief Complaint   Patient presents with    Cough      History of Present Illness:   Patient is a 48-year-old female that presents today for her annual physical exam.  She complains of a cough that is been going on for around 5 weeks.  She does note it is getting better. She was exposed to sick contacts over 5 weeks ago.  She has been using Delsym with no relief.  The cough is not getting any better.  She denies any other URI symptoms.    Health maintenance:  Due for Pap smear, mammogram, colonoscopy.  Family history of heart attacks by 54.     The following portions of the patient's history were reviewed and updated as appropriate: allergies, current medications, past family history, past medical history, past social history, past surgical history and problem list.     Review of Systems:     Review of Systems   Constitutional:  Positive for fatigue. Negative for chills and fever.   HENT:  Negative for ear discharge, ear pain, postnasal drip, rhinorrhea, sinus pressure, sinus pain, sore throat, tinnitus and trouble swallowing.    Eyes:  Negative for pain, discharge and itching.   Respiratory:  Positive for cough (Alternates between dry and purulent), shortness of breath (LOAIZA) and wheezing.    Cardiovascular:  Negative for chest pain, palpitations and leg swelling.   Gastrointestinal:  Negative for abdominal pain, constipation, diarrhea, nausea and vomiting.   Endocrine: Negative for polydipsia, polyphagia and polyuria.   Genitourinary:  Negative for difficulty urinating, frequency, hematuria and urgency.   Musculoskeletal:  Negative for arthralgias, joint swelling and myalgias.   Skin:  Negative for color change.   Allergic/Immunologic: Negative for environmental allergies.   Neurological:  Negative for dizziness, weakness, light-headedness, numbness and headaches.   Hematological:  Negative for adenopathy.   Psychiatric/Behavioral:  Negative for decreased concentration and sleep disturbance. The  "patient is not nervous/anxious.         Past Medical History:     Past Medical History:   Diagnosis Date    Anxiety     Chronic post-traumatic stress disorder (PTSD)     Hypertension     Obesity         Current Medications:     Current Outpatient Medications:     ALPRAZolam (XANAX) 1 mg tablet, Take 1 mg by mouth daily as needed for anxiety PRN, Disp: , Rfl: 0    DULoxetine (CYMBALTA) 60 mg delayed release capsule, Take 120 mg by mouth in the morning, Disp: , Rfl:     hydrOXYzine HCL (ATARAX) 25 mg tablet, Take 25 mg by mouth 2 (two) times a day 2 tablets, Disp: , Rfl:     topiramate (TOPAMAX) 100 mg tablet, Take 200 mg by mouth in the morning, Disp: , Rfl:     traZODone (DESYREL) 100 mg tablet, Take 200 mg by mouth daily at bedtime as needed Per pt has 100 mg tablets and  take 200mg, Disp: , Rfl:     VYVANSE 30 MG capsule, Take 60 mg by mouth in the morning, Disp: , Rfl: 0    zolpidem (AMBIEN) 10 mg tablet, Take 10 mg by mouth daily at bedtime as needed for sleep, Disp: , Rfl:     Lemborexant (DayVigo) 5 MG TABS, Take 5 mg combined by mouth daily at bedtime as needed, Disp: , Rfl:     semaglutide, 0.25 or 0.5 mg/dose, (Ozempic, 0.25 or 0.5 MG/DOSE,) 2 mg/3 mL injection pen, Inject 0.75 mL (0.5 mg total) under the skin every 7 days, Disp: 9 mL, Rfl: 1     Allergies:   No Known Allergies     Physical Exam:     /82 (BP Location: Left arm, Patient Position: Sitting, Cuff Size: Large)   Pulse 91   Temp 98.4 °F (36.9 °C) (Tympanic)   Ht 5' 8\" (1.727 m)   Wt 112 kg (247 lb)   SpO2 96%   BMI 37.56 kg/m²     Physical Exam  Vitals and nursing note reviewed.   Constitutional:       General: She is awake. She is not in acute distress.     Appearance: Normal appearance. She is well-developed, well-groomed and overweight.   HENT:      Head: Normocephalic and atraumatic.      Right Ear: Hearing and external ear normal.      Left Ear: Hearing and external ear normal.      Nose: Nose normal.      Mouth/Throat:      " Lips: Pink.      Mouth: Mucous membranes are moist.   Eyes:      General: Lids are normal. Vision grossly intact. Gaze aligned appropriately.      Conjunctiva/sclera: Conjunctivae normal.   Neck:      Vascular: No carotid bruit.      Trachea: Trachea and phonation normal.   Cardiovascular:      Rate and Rhythm: Normal rate and regular rhythm.      Heart sounds: Normal heart sounds, S1 normal and S2 normal. No murmur heard.     No friction rub. No gallop.   Pulmonary:      Effort: Pulmonary effort is normal. No respiratory distress.      Breath sounds: Normal breath sounds. Decreased air movement present. No decreased breath sounds, wheezing, rhonchi or rales.   Abdominal:      General: Abdomen is protuberant.   Musculoskeletal:         General: No swelling.      Cervical back: Neck supple.      Right lower leg: No edema.      Left lower leg: No edema.   Skin:     General: Skin is warm.      Capillary Refill: Capillary refill takes less than 2 seconds.   Neurological:      Mental Status: She is alert.   Psychiatric:         Attention and Perception: Attention and perception normal.         Mood and Affect: Mood and affect normal.         Speech: Speech normal.         Behavior: Behavior normal. Behavior is cooperative.         Thought Content: Thought content normal.         Cognition and Memory: Cognition and memory normal.         Judgment: Judgment normal.           Data:     Laboratory Results: I have personally reviewed the pertinent laboratory results/reports   Radiology/Other Diagnostic Testing Results: I have personally reviewed pertinent reports.      Harini Hutson PA-C  [facility]

## 2024-02-22 ENCOUNTER — TELEPHONE (OUTPATIENT)
Age: 49
End: 2024-02-22

## 2024-02-22 DIAGNOSIS — R05.2 SUBACUTE COUGH: Primary | ICD-10-CM

## 2024-02-22 RX ORDER — PREDNISONE 10 MG/1
TABLET ORAL
Qty: 28 TABLET | Refills: 0 | Status: SHIPPED | OUTPATIENT
Start: 2024-02-22

## 2024-02-22 NOTE — TELEPHONE ENCOUNTER
----- Message from Harini Hutson PA-C sent at 2/22/2024  7:35 AM EST -----  Chest x-ray is negative for any pneumonia or inflammatory cause.  How is the albuterol inhaler and the cough syrup working for you?

## 2024-02-27 ENCOUNTER — APPOINTMENT (OUTPATIENT)
Dept: LAB | Facility: CLINIC | Age: 49
End: 2024-02-27
Payer: COMMERCIAL

## 2024-02-27 DIAGNOSIS — Z13.220 SCREENING FOR LIPID DISORDERS: ICD-10-CM

## 2024-02-27 DIAGNOSIS — R53.83 OTHER FATIGUE: ICD-10-CM

## 2024-02-27 DIAGNOSIS — R73.01 IMPAIRED FASTING GLUCOSE: ICD-10-CM

## 2024-02-27 DIAGNOSIS — Z13.0 SCREENING FOR DEFICIENCY ANEMIA: ICD-10-CM

## 2024-02-27 DIAGNOSIS — R31.29 OTHER MICROSCOPIC HEMATURIA: ICD-10-CM

## 2024-02-27 DIAGNOSIS — Z11.59 NEED FOR HEPATITIS C SCREENING TEST: ICD-10-CM

## 2024-02-27 LAB
ALBUMIN SERPL BCP-MCNC: 4.8 G/DL (ref 3.5–5)
ALP SERPL-CCNC: 81 U/L (ref 34–104)
ALT SERPL W P-5'-P-CCNC: 15 U/L (ref 7–52)
ANION GAP SERPL CALCULATED.3IONS-SCNC: 8 MMOL/L
AST SERPL W P-5'-P-CCNC: 13 U/L (ref 13–39)
BACTERIA UR QL AUTO: ABNORMAL /HPF
BASOPHILS # BLD AUTO: 0.03 THOUSANDS/ÂΜL (ref 0–0.1)
BASOPHILS NFR BLD AUTO: 0 % (ref 0–1)
BILIRUB SERPL-MCNC: 0.32 MG/DL (ref 0.2–1)
BILIRUB UR QL STRIP: NEGATIVE
BUN SERPL-MCNC: 22 MG/DL (ref 5–25)
CALCIUM SERPL-MCNC: 10.2 MG/DL (ref 8.4–10.2)
CAOX CRY URNS QL MICRO: ABNORMAL /HPF
CHLORIDE SERPL-SCNC: 106 MMOL/L (ref 96–108)
CHOLEST SERPL-MCNC: 213 MG/DL
CLARITY UR: CLEAR
CO2 SERPL-SCNC: 26 MMOL/L (ref 21–32)
COLOR UR: ABNORMAL
CREAT SERPL-MCNC: 0.93 MG/DL (ref 0.6–1.3)
EOSINOPHIL # BLD AUTO: 0.07 THOUSAND/ÂΜL (ref 0–0.61)
EOSINOPHIL NFR BLD AUTO: 1 % (ref 0–6)
ERYTHROCYTE [DISTWIDTH] IN BLOOD BY AUTOMATED COUNT: 13.9 % (ref 11.6–15.1)
EST. AVERAGE GLUCOSE BLD GHB EST-MCNC: 111 MG/DL
GFR SERPL CREATININE-BSD FRML MDRD: 72 ML/MIN/1.73SQ M
GLUCOSE P FAST SERPL-MCNC: 86 MG/DL (ref 65–99)
GLUCOSE UR STRIP-MCNC: NEGATIVE MG/DL
HBA1C MFR BLD: 5.5 %
HCT VFR BLD AUTO: 38 % (ref 34.8–46.1)
HDLC SERPL-MCNC: 58 MG/DL
HGB BLD-MCNC: 13 G/DL (ref 11.5–15.4)
HGB UR QL STRIP.AUTO: NEGATIVE
HYALINE CASTS #/AREA URNS LPF: ABNORMAL /LPF
IMM GRANULOCYTES # BLD AUTO: 0.07 THOUSAND/UL (ref 0–0.2)
IMM GRANULOCYTES NFR BLD AUTO: 1 % (ref 0–2)
KETONES UR STRIP-MCNC: NEGATIVE MG/DL
LDLC SERPL CALC-MCNC: 137 MG/DL (ref 0–100)
LEUKOCYTE ESTERASE UR QL STRIP: ABNORMAL
LYMPHOCYTES # BLD AUTO: 3.12 THOUSANDS/ÂΜL (ref 0.6–4.47)
LYMPHOCYTES NFR BLD AUTO: 32 % (ref 14–44)
MCH RBC QN AUTO: 30.7 PG (ref 26.8–34.3)
MCHC RBC AUTO-ENTMCNC: 34.2 G/DL (ref 31.4–37.4)
MCV RBC AUTO: 90 FL (ref 82–98)
MONOCYTES # BLD AUTO: 0.71 THOUSAND/ÂΜL (ref 0.17–1.22)
MONOCYTES NFR BLD AUTO: 7 % (ref 4–12)
MUCOUS THREADS UR QL AUTO: ABNORMAL
NEUTROPHILS # BLD AUTO: 5.7 THOUSANDS/ÂΜL (ref 1.85–7.62)
NEUTS SEG NFR BLD AUTO: 59 % (ref 43–75)
NITRITE UR QL STRIP: NEGATIVE
NON-SQ EPI CELLS URNS QL MICRO: ABNORMAL /HPF
NONHDLC SERPL-MCNC: 155 MG/DL
NRBC BLD AUTO-RTO: 0 /100 WBCS
PH UR STRIP.AUTO: 6 [PH]
PLATELET # BLD AUTO: 346 THOUSANDS/UL (ref 149–390)
PMV BLD AUTO: 11 FL (ref 8.9–12.7)
POTASSIUM SERPL-SCNC: 3.8 MMOL/L (ref 3.5–5.3)
PROT SERPL-MCNC: 7.4 G/DL (ref 6.4–8.4)
PROT UR STRIP-MCNC: NEGATIVE MG/DL
RBC # BLD AUTO: 4.24 MILLION/UL (ref 3.81–5.12)
RBC #/AREA URNS AUTO: ABNORMAL /HPF
SODIUM SERPL-SCNC: 140 MMOL/L (ref 135–147)
SP GR UR STRIP.AUTO: 1.02 (ref 1–1.03)
TRIGL SERPL-MCNC: 90 MG/DL
TSH SERPL DL<=0.05 MIU/L-ACNC: 2.26 UIU/ML (ref 0.45–4.5)
UROBILINOGEN UR STRIP-ACNC: <2 MG/DL
WBC # BLD AUTO: 9.7 THOUSAND/UL (ref 4.31–10.16)
WBC #/AREA URNS AUTO: ABNORMAL /HPF

## 2024-02-27 PROCEDURE — 80061 LIPID PANEL: CPT

## 2024-02-27 PROCEDURE — 82652 VIT D 1 25-DIHYDROXY: CPT

## 2024-02-27 PROCEDURE — 84443 ASSAY THYROID STIM HORMONE: CPT

## 2024-02-27 PROCEDURE — 36415 COLL VENOUS BLD VENIPUNCTURE: CPT

## 2024-02-27 PROCEDURE — 85025 COMPLETE CBC W/AUTO DIFF WBC: CPT

## 2024-02-27 PROCEDURE — 81001 URINALYSIS AUTO W/SCOPE: CPT

## 2024-02-27 PROCEDURE — 80053 COMPREHEN METABOLIC PANEL: CPT

## 2024-02-27 PROCEDURE — 86803 HEPATITIS C AB TEST: CPT

## 2024-02-27 PROCEDURE — 83036 HEMOGLOBIN GLYCOSYLATED A1C: CPT

## 2024-02-28 DIAGNOSIS — R73.01 IMPAIRED FASTING GLUCOSE: Primary | ICD-10-CM

## 2024-02-28 DIAGNOSIS — E78.2 MIXED HYPERLIPIDEMIA: ICD-10-CM

## 2024-02-28 DIAGNOSIS — R53.83 OTHER FATIGUE: ICD-10-CM

## 2024-02-28 LAB
1,25(OH)2D3 SERPL-MCNC: 82.8 PG/ML (ref 24.8–81.5)
HCV AB SER QL: NORMAL

## 2024-03-04 ENCOUNTER — TELEPHONE (OUTPATIENT)
Dept: GASTROENTEROLOGY | Facility: CLINIC | Age: 49
End: 2024-03-04

## 2024-06-04 ENCOUNTER — OFFICE VISIT (OUTPATIENT)
Age: 49
End: 2024-06-04
Payer: COMMERCIAL

## 2024-06-04 ENCOUNTER — HOSPITAL ENCOUNTER (EMERGENCY)
Facility: HOSPITAL | Age: 49
Discharge: HOME | End: 2024-06-04
Attending: EMERGENCY MEDICINE
Payer: COMMERCIAL

## 2024-06-04 ENCOUNTER — E-CONSULT REQUEST (OUTPATIENT)
Dept: DERMATOLOGY | Facility: CLINIC | Age: 49
End: 2024-06-04

## 2024-06-04 ENCOUNTER — APPOINTMENT (EMERGENCY)
Dept: RADIOLOGY | Facility: HOSPITAL | Age: 49
End: 2024-06-04
Payer: COMMERCIAL

## 2024-06-04 ENCOUNTER — APPOINTMENT (EMERGENCY)
Dept: VASCULAR ULTRASOUND | Facility: HOSPITAL | Age: 49
End: 2024-06-04
Payer: COMMERCIAL

## 2024-06-04 VITALS
BODY MASS INDEX: 37.56 KG/M2 | HEART RATE: 64 BPM | RESPIRATION RATE: 18 BRPM | WEIGHT: 247 LBS | OXYGEN SATURATION: 96 % | DIASTOLIC BLOOD PRESSURE: 88 MMHG | SYSTOLIC BLOOD PRESSURE: 130 MMHG | TEMPERATURE: 97.5 F

## 2024-06-04 VITALS
HEART RATE: 86 BPM | DIASTOLIC BLOOD PRESSURE: 94 MMHG | OXYGEN SATURATION: 100 % | RESPIRATION RATE: 18 BRPM | TEMPERATURE: 97 F | SYSTOLIC BLOOD PRESSURE: 174 MMHG

## 2024-06-04 DIAGNOSIS — M79.89 SWELLING OF CALF: Primary | ICD-10-CM

## 2024-06-04 DIAGNOSIS — L30.9 DERMATITIS: Primary | ICD-10-CM

## 2024-06-04 DIAGNOSIS — R21 RASH: Primary | ICD-10-CM

## 2024-06-04 LAB
ALBUMIN SERPL BCP-MCNC: 4.3 G/DL (ref 3.5–5)
ALP SERPL-CCNC: 65 U/L (ref 34–104)
ALT SERPL W P-5'-P-CCNC: 11 U/L (ref 7–52)
ANION GAP SERPL CALCULATED.3IONS-SCNC: 6 MMOL/L (ref 4–13)
APTT PPP: 33 SECONDS (ref 23–37)
AST SERPL W P-5'-P-CCNC: 16 U/L (ref 13–39)
B BURGDOR IGG SERPL QL IA: NEGATIVE
B BURGDOR IGG+IGM SER QL IA: POSITIVE
B BURGDOR IGM SERPL QL IA: NEGATIVE
BASOPHILS # BLD AUTO: 0.02 THOUSANDS/ÂΜL (ref 0–0.1)
BASOPHILS NFR BLD AUTO: 0 % (ref 0–1)
BILIRUB SERPL-MCNC: 0.3 MG/DL (ref 0.2–1)
BNP SERPL-MCNC: 18 PG/ML (ref 0–100)
BUN SERPL-MCNC: 19 MG/DL (ref 5–25)
CALCIUM SERPL-MCNC: 10.1 MG/DL (ref 8.4–10.2)
CARDIAC TROPONIN I PNL SERPL HS: <2 NG/L
CHLORIDE SERPL-SCNC: 110 MMOL/L (ref 96–108)
CO2 SERPL-SCNC: 25 MMOL/L (ref 21–32)
CREAT SERPL-MCNC: 0.69 MG/DL (ref 0.6–1.3)
CRP SERPL QL: 1.5 MG/L
EOSINOPHIL # BLD AUTO: 0.22 THOUSAND/ÂΜL (ref 0–0.61)
EOSINOPHIL NFR BLD AUTO: 4 % (ref 0–6)
ERYTHROCYTE [DISTWIDTH] IN BLOOD BY AUTOMATED COUNT: 13.7 % (ref 11.6–15.1)
ERYTHROCYTE [SEDIMENTATION RATE] IN BLOOD: 3 MM/HOUR (ref 0–29)
GFR SERPL CREATININE-BSD FRML MDRD: 101 ML/MIN/1.73SQ M
GLUCOSE SERPL-MCNC: 109 MG/DL (ref 65–140)
HCT VFR BLD AUTO: 39 % (ref 34.8–46.1)
HGB BLD-MCNC: 12.6 G/DL (ref 11.5–15.4)
IMM GRANULOCYTES # BLD AUTO: 0.01 THOUSAND/UL (ref 0–0.2)
IMM GRANULOCYTES NFR BLD AUTO: 0 % (ref 0–2)
INR PPP: 0.93 (ref 0.84–1.19)
LACTATE SERPL-SCNC: 1.4 MMOL/L (ref 0.5–2)
LYMPHOCYTES # BLD AUTO: 1.89 THOUSANDS/ÂΜL (ref 0.6–4.47)
LYMPHOCYTES NFR BLD AUTO: 33 % (ref 14–44)
MAGNESIUM SERPL-MCNC: 2.2 MG/DL (ref 1.9–2.7)
MCH RBC QN AUTO: 29.8 PG (ref 26.8–34.3)
MCHC RBC AUTO-ENTMCNC: 32.3 G/DL (ref 31.4–37.4)
MCV RBC AUTO: 92 FL (ref 82–98)
MONOCYTES # BLD AUTO: 0.37 THOUSAND/ÂΜL (ref 0.17–1.22)
MONOCYTES NFR BLD AUTO: 7 % (ref 4–12)
NEUTROPHILS # BLD AUTO: 3.15 THOUSANDS/ÂΜL (ref 1.85–7.62)
NEUTS SEG NFR BLD AUTO: 56 % (ref 43–75)
NRBC BLD AUTO-RTO: 0 /100 WBCS
PLATELET # BLD AUTO: 260 THOUSANDS/UL (ref 149–390)
PMV BLD AUTO: 10.8 FL (ref 8.9–12.7)
POTASSIUM SERPL-SCNC: 3.9 MMOL/L (ref 3.5–5.3)
PROT SERPL-MCNC: 7.2 G/DL (ref 6.4–8.4)
PROTHROMBIN TIME: 13 SECONDS (ref 11.6–14.5)
RBC # BLD AUTO: 4.23 MILLION/UL (ref 3.81–5.12)
SODIUM SERPL-SCNC: 141 MMOL/L (ref 135–147)
TSH SERPL DL<=0.05 MIU/L-ACNC: 2.41 UIU/ML (ref 0.45–4.5)
WBC # BLD AUTO: 5.66 THOUSAND/UL (ref 4.31–10.16)

## 2024-06-04 PROCEDURE — 83605 ASSAY OF LACTIC ACID: CPT | Performed by: PHYSICIAN ASSISTANT

## 2024-06-04 PROCEDURE — 83735 ASSAY OF MAGNESIUM: CPT | Performed by: PHYSICIAN ASSISTANT

## 2024-06-04 PROCEDURE — 71045 X-RAY EXAM CHEST 1 VIEW: CPT

## 2024-06-04 PROCEDURE — 84484 ASSAY OF TROPONIN QUANT: CPT | Performed by: PHYSICIAN ASSISTANT

## 2024-06-04 PROCEDURE — 93971 EXTREMITY STUDY: CPT | Performed by: SURGERY

## 2024-06-04 PROCEDURE — 85025 COMPLETE CBC W/AUTO DIFF WBC: CPT | Performed by: PHYSICIAN ASSISTANT

## 2024-06-04 PROCEDURE — 85652 RBC SED RATE AUTOMATED: CPT | Performed by: PHYSICIAN ASSISTANT

## 2024-06-04 PROCEDURE — 83880 ASSAY OF NATRIURETIC PEPTIDE: CPT | Performed by: PHYSICIAN ASSISTANT

## 2024-06-04 PROCEDURE — 86140 C-REACTIVE PROTEIN: CPT | Performed by: PHYSICIAN ASSISTANT

## 2024-06-04 PROCEDURE — 93971 EXTREMITY STUDY: CPT

## 2024-06-04 PROCEDURE — 85610 PROTHROMBIN TIME: CPT | Performed by: PHYSICIAN ASSISTANT

## 2024-06-04 PROCEDURE — 84443 ASSAY THYROID STIM HORMONE: CPT | Performed by: PHYSICIAN ASSISTANT

## 2024-06-04 PROCEDURE — 86618 LYME DISEASE ANTIBODY: CPT | Performed by: PHYSICIAN ASSISTANT

## 2024-06-04 PROCEDURE — 80053 COMPREHEN METABOLIC PANEL: CPT | Performed by: PHYSICIAN ASSISTANT

## 2024-06-04 PROCEDURE — 85730 THROMBOPLASTIN TIME PARTIAL: CPT | Performed by: PHYSICIAN ASSISTANT

## 2024-06-04 PROCEDURE — 87040 BLOOD CULTURE FOR BACTERIA: CPT | Performed by: PHYSICIAN ASSISTANT

## 2024-06-04 PROCEDURE — G0382 LEV 3 HOSP TYPE B ED VISIT: HCPCS | Performed by: PHYSICIAN ASSISTANT

## 2024-06-04 PROCEDURE — NC001 PR NO CHARGE: Performed by: DERMATOLOGY

## 2024-06-04 PROCEDURE — 93005 ELECTROCARDIOGRAM TRACING: CPT

## 2024-06-04 PROCEDURE — 86757 RICKETTSIA ANTIBODY: CPT | Performed by: PHYSICIAN ASSISTANT

## 2024-06-04 PROCEDURE — 99285 EMERGENCY DEPT VISIT HI MDM: CPT | Performed by: PHYSICIAN ASSISTANT

## 2024-06-04 PROCEDURE — 86617 LYME DISEASE ANTIBODY: CPT | Performed by: PHYSICIAN ASSISTANT

## 2024-06-04 PROCEDURE — 99283 EMERGENCY DEPT VISIT LOW MDM: CPT

## 2024-06-04 PROCEDURE — 36415 COLL VENOUS BLD VENIPUNCTURE: CPT | Performed by: PHYSICIAN ASSISTANT

## 2024-06-04 RX ORDER — TRIAMCINOLONE ACETONIDE 1 MG/G
OINTMENT TOPICAL 2 TIMES DAILY
Qty: 30 G | Refills: 0 | Status: SHIPPED | OUTPATIENT
Start: 2024-06-04 | End: 2024-06-18

## 2024-06-04 RX ORDER — DOXYCYCLINE HYCLATE 100 MG/1
100 CAPSULE ORAL 2 TIMES DAILY
Qty: 14 CAPSULE | Refills: 0 | Status: SHIPPED | OUTPATIENT
Start: 2024-06-04 | End: 2024-06-11

## 2024-06-04 RX ORDER — DEXTROAMPHETAMINE SACCHARATE, AMPHETAMINE ASPARTATE MONOHYDRATE, DEXTROAMPHETAMINE SULFATE AND AMPHETAMINE SULFATE 2.5; 2.5; 2.5; 2.5 MG/1; MG/1; MG/1; MG/1
CAPSULE, EXTENDED RELEASE ORAL
COMMUNITY

## 2024-06-04 RX ORDER — DEXTROAMPHETAMINE SACCHARATE, AMPHETAMINE ASPARTATE MONOHYDRATE, DEXTROAMPHETAMINE SULFATE AND AMPHETAMINE SULFATE 7.5; 7.5; 7.5; 7.5 MG/1; MG/1; MG/1; MG/1
CAPSULE, EXTENDED RELEASE ORAL
COMMUNITY

## 2024-06-04 RX ORDER — DOXEPIN HYDROCHLORIDE 25 MG/1
1 CAPSULE ORAL
COMMUNITY

## 2024-06-04 RX ORDER — SULFAMETHOXAZOLE AND TRIMETHOPRIM 800; 160 MG/1; MG/1
TABLET ORAL
COMMUNITY

## 2024-06-04 NOTE — Clinical Note
Mario Palm was seen and treated in our emergency department on 6/4/2024.    No restrictions            Diagnosis:     Mario  may return to work on return date.    She may return on this date: 06/06/2024         If you have any questions or concerns, please don't hesitate to call.      Julie Lynn Gutzweiler, PA-C    ______________________________           _______________          _______________  Hospital Representative                              Date                                Time

## 2024-06-04 NOTE — ED PROVIDER NOTES
History  Chief Complaint   Patient presents with    Insect Bite     Pt presents with multiple bug bites since Saturday, bilateral leg, feet and ankle swelling. Sent in by urgent care for evaluation     Patient presents to the emergency room after nose seeing bug bites on her legs on Saturday.  She is complaining of associated swelling.  She was recently camping.  She denies any fever.  She denies any sore throat.  She complains of nasal congestion, postnasal drip.  She denies any chest shortness she denies any abdominal pain.  She complains of some low back pain.  She complains of numbness over the anterior aspect of her lower legs.  She complains of more swelling on the left as compared to the right.  WERE measured over at the urgent care center of the left calf is 5 cm larger than the right.  She does complain of some calf tenderness.  Patient states on Saturday night while she was camping she took her Ambien.  She states that she immediately went to sleep and was unsure whether she blacked out at that time.  She denies any injury.  She states that she has had some upper respiratory symptoms and has been taking Claritin 10 mg at night.  This morning she was still complaining of the symptoms so she took hydroxyzine.  She fell asleep in our waiting room.  She is presently groggy from her medications.    Past medical history is positive for anxiety, chronic posttraumatic stress disorder, hypertension, obesity.      History provided by:  Patient  Insect Bite  Contact animal:  Unable to specify  Animal bite location: both lower legs.  Time since incident:  3 days  Pain details:     Quality:  Numbness    Severity:  No pain  Incident location:  Outside (camping)  Worsened by:  Nothing  Ineffective treatments:  None tried  Associated symptoms: rash and swelling    Associated symptoms: no fever and no numbness        Prior to Admission Medications   Prescriptions Last Dose Informant Patient Reported? Taking?    ALPRAZolam (XANAX) 1 mg tablet  Self Yes No   Sig: Take 1 mg by mouth daily as needed for anxiety PRN   DULoxetine (CYMBALTA) 60 mg delayed release capsule   Yes No   Sig: Take 120 mg by mouth in the morning   VYVANSE 30 MG capsule  Self Yes No   Sig: Take 60 mg by mouth in the morning   Vyvanse 60 MG capsule   Yes No   Sig: take 1 capsule by mouth every day in the morning   Patient not taking: Reported on 6/4/2024   albuterol (Ventolin HFA) 90 mcg/act inhaler   No No   Sig: Inhale 2 puffs every 6 (six) hours as needed for wheezing   amphetamine-dextroamphetamine (ADDERALL XR) 10 MG 24 hr capsule   Yes No   amphetamine-dextroamphetamine (ADDERALL XR) 30 MG 24 hr capsule   Yes No   doxepin (SINEquan) 25 mg capsule   Yes No   Sig: Take 1 capsule by mouth daily at bedtime   Patient not taking: Reported on 6/4/2024   hydrOXYzine HCL (ATARAX) 25 mg tablet   Yes No   Sig: Take 25 mg by mouth 2 (two) times a day 2 tablets   predniSONE 10 mg tablet   No No   Sig: Take 4 tablets for 3 days then 3 tablets for 3 days then 2 tablet for 3 days 1 for 1 day   Patient not taking: Reported on 6/4/2024   promethazine-dextromethorphan (PHENERGAN-DM) 6.25-15 mg/5 mL oral syrup   No No   Sig: Take 5 mL by mouth 4 (four) times a day as needed for cough   Patient not taking: Reported on 6/4/2024   sulfamethoxazole-trimethoprim (BACTRIM DS) 800-160 mg per tablet   Yes No   Sig: TAKE 1 TABLET BY MOUTH TWICE A DAY FOR 3 DAYS   Patient not taking: Reported on 6/4/2024   topiramate (TOPAMAX) 100 mg tablet   Yes No   Sig: Take 200 mg by mouth in the morning   traZODone (DESYREL) 100 mg tablet  Self Yes No   Sig: Take 200 mg by mouth daily at bedtime as needed Per pt has 100 mg tablets and  take 200mg   traZODone (DESYREL) 150 mg tablet   Yes No   Sig: Take  mg by mouth daily at bedtime as needed   triamcinolone (KENALOG) 0.025 % cream   Yes No   Sig: PLEASE SEE ATTACHED FOR DETAILED DIRECTIONS   Patient not taking: Reported on 6/4/2024    zolpidem (AMBIEN) 10 mg tablet   Yes No   Sig: Take 10 mg by mouth daily at bedtime as needed for sleep      Facility-Administered Medications: None       Past Medical History:   Diagnosis Date    Anxiety     Chronic post-traumatic stress disorder (PTSD)     Hypertension     Obesity        Past Surgical History:   Procedure Laterality Date    BREAST BIOPSY Left 2012    benign     SECTION      CYST REMOVAL      on back.       Family History   Problem Relation Age of Onset    Heart failure Mother     Heart failure Father     No Known Problems Daughter     No Known Problems Daughter     Breast cancer Maternal Grandmother 60    No Known Problems Maternal Grandfather     Breast cancer Paternal Grandmother 60    No Known Problems Paternal Grandfather     Coronary artery disease Family     Heart attack Family     No Known Problems Maternal Aunt      I have reviewed and agree with the history as documented.    E-Cigarette/Vaping    E-Cigarette Use Never User      E-Cigarette/Vaping Substances    Nicotine No     THC No     CBD Yes     Flavoring No     Other No     Unknown No      Social History     Tobacco Use    Smoking status: Former     Current packs/day: 0.00     Average packs/day: 1 pack/day for 16.0 years (16.0 ttl pk-yrs)     Types: Cigarettes     Start date:      Quit date:      Years since quittin.4    Smokeless tobacco: Never   Vaping Use    Vaping status: Never Used   Substance Use Topics    Alcohol use: Yes     Comment: socially    Drug use: Yes     Types: Marijuana     Comment: medical marijuana card       Review of Systems   Constitutional:  Positive for activity change. Negative for appetite change, chills, diaphoresis, fatigue and fever.   HENT:  Negative for congestion, dental problem, ear discharge, ear pain, postnasal drip, rhinorrhea, sore throat and trouble swallowing.    Eyes:  Negative for pain, discharge, redness and itching.   Respiratory:  Negative for cough, chest  tightness and shortness of breath.    Cardiovascular:  Positive for leg swelling. Negative for chest pain and palpitations.   Gastrointestinal:  Negative for abdominal pain, diarrhea, nausea and vomiting.   Endocrine: Negative for cold intolerance, heat intolerance, polydipsia, polyphagia and polyuria.   Genitourinary:  Negative for dysuria, frequency, hematuria and urgency.   Musculoskeletal:  Negative for gait problem.   Skin:  Positive for color change and rash.   Neurological:  Negative for numbness.   Psychiatric/Behavioral:  Negative for confusion.    All other systems reviewed and are negative.      Physical Exam  Physical Exam  Vitals and nursing note reviewed.   Constitutional:       General: She is not in acute distress.     Appearance: Normal appearance. She is normal weight. She is not ill-appearing, toxic-appearing or diaphoretic.   HENT:      Head: Normocephalic and atraumatic.      Right Ear: External ear normal.      Left Ear: External ear normal.      Nose: No congestion or rhinorrhea.      Mouth/Throat:      Pharynx: Oropharynx is clear. No oropharyngeal exudate or posterior oropharyngeal erythema.   Cardiovascular:      Rate and Rhythm: Normal rate and regular rhythm.      Heart sounds: Normal heart sounds.   Pulmonary:      Effort: Pulmonary effort is normal.      Breath sounds: Normal breath sounds.   Abdominal:      General: There is no distension.      Palpations: Abdomen is soft.      Tenderness: There is no abdominal tenderness. There is no guarding or rebound.   Musculoskeletal:         General: Swelling and tenderness present.      Cervical back: Neck supple. No rigidity or tenderness.      Right lower leg: Edema present.      Left lower leg: Edema present.   Lymphadenopathy:      Cervical: No cervical adenopathy.   Skin:     General: Skin is warm.      Capillary Refill: Capillary refill takes less than 2 seconds.      Findings: Erythema and rash present.      Comments: Erythematous  macular rash on both ankles.  Please see pictures.   Neurological:      Mental Status: She is alert and oriented to person, place, and time.   Psychiatric:         Mood and Affect: Mood normal.         Behavior: Behavior normal.         Thought Content: Thought content normal.         Judgment: Judgment normal.         Vital Signs  ED Triage Vitals [06/04/24 1002]   Temperature Pulse Respirations Blood Pressure SpO2   (!) 97 °F (36.1 °C) 86 18 (!) 174/94 100 %      Temp Source Heart Rate Source Patient Position - Orthostatic VS BP Location FiO2 (%)   Tympanic Monitor Sitting Left arm --      Pain Score       --           Vitals:    06/04/24 1002   BP: (!) 174/94   Pulse: 86   Patient Position - Orthostatic VS: Sitting         Visual Acuity      ED Medications  Medications - No data to display    Diagnostic Studies  Results Reviewed       Procedure Component Value Units Date/Time    HS Troponin I 4hr [[id number]]     Lab Status: No result Specimen: Blood     TSH, 3rd generation with Free T4 reflex [[id number]]  (Normal) Collected: 06/04/24 1310    Lab Status: Final result Specimen: Blood from Arm, Right Updated: 06/04/24 1353     TSH 3RD GENERATON 2.409 uIU/mL     Lactic acid, plasma (w/reflex if result > 2.0) [[id number]]  (Normal) Collected: 06/04/24 1310    Lab Status: Final result Specimen: Blood from Arm, Right Updated: 06/04/24 1348     LACTIC ACID 1.4 mmol/L     Narrative:      Result may be elevated if tourniquet was used during collection.    HS Troponin I 2hr [[id number]]     Lab Status: No result Specimen: Blood     HS Troponin 0hr (reflex protocol) [[id number]]  (Normal) Collected: 06/04/24 1310    Lab Status: Final result Specimen: Blood from Arm, Right Updated: 06/04/24 1343     hs TnI 0hr <2 ng/L     B-Type Natriuretic Peptide(BNP) [[id number]]  (Normal) Collected: 06/04/24 1310    Lab Status: Final result Specimen: Blood from Arm, Right Updated: 06/04/24 1343     BNP 18 pg/mL     Comprehensive metabolic  panel [[id number]]  (Abnormal) Collected: 06/04/24 1310    Lab Status: Final result Specimen: Blood from Arm, Right Updated: 06/04/24 1340     Sodium 141 mmol/L      Potassium 3.9 mmol/L      Chloride 110 mmol/L      CO2 25 mmol/L      ANION GAP 6 mmol/L      BUN 19 mg/dL      Creatinine 0.69 mg/dL      Glucose 109 mg/dL      Calcium 10.1 mg/dL      AST 16 U/L      ALT 11 U/L      Alkaline Phosphatase 65 U/L      Total Protein 7.2 g/dL      Albumin 4.3 g/dL      Total Bilirubin 0.30 mg/dL      eGFR 101 ml/min/1.73sq m     Narrative:      National [employer] guidelines for Chronic Kidney Disease (CKD):     Stage 1 with normal or high GFR (GFR > 90 mL/min/1.73 square meters)    Stage 2 Mild CKD (GFR = 60-89 mL/min/1.73 square meters)    Stage 3A Moderate CKD (GFR = 45-59 mL/min/1.73 square meters)    Stage 3B Moderate CKD (GFR = 30-44 mL/min/1.73 square meters)    Stage 4 Severe CKD (GFR = 15-29 mL/min/1.73 square meters)    Stage 5 End Stage CKD (GFR <15 mL/min/1.73 square meters)  Note: GFR calculation is accurate only with a steady state creatinine    Magnesium [[id number]]  (Normal) Collected: 06/04/24 1310    Lab Status: Final result Specimen: Blood from Arm, Right Updated: 06/04/24 1340     Magnesium 2.2 mg/dL     C-reactive protein [[id number]]  (Normal) Collected: 06/04/24 1310    Lab Status: Final result Specimen: Blood from Arm, Right Updated: 06/04/24 1340     CRP 1.5 mg/L     Protime-INR [[id number]]  (Normal) Collected: 06/04/24 1310    Lab Status: Final result Specimen: Blood from Arm, Right Updated: 06/04/24 1335     Protime 13.0 seconds      INR 0.93    APTT [[id number]]  (Normal) Collected: 06/04/24 1310    Lab Status: Final result Specimen: Blood from Arm, Right Updated: 06/04/24 1335     PTT 33 seconds     Sedimentation rate, automated [[id number]]  (Normal) Collected: 06/04/24 1310    Lab Status: Final result Specimen: Blood from Arm, Right Updated: 06/04/24 1324     Sed Rate 3 mm/hour      CBC and differential [[id number]] Collected: 06/04/24 1310    Lab Status: Final result Specimen: Blood from Arm, Right Updated: 06/04/24 1321     WBC 5.66 Thousand/uL      RBC 4.23 Million/uL      Hemoglobin 12.6 g/dL      Hematocrit 39.0 %      MCV 92 fL      MCH 29.8 pg      MCHC 32.3 g/dL      RDW 13.7 %      MPV 10.8 fL      Platelets 260 Thousands/uL      nRBC 0 /100 WBCs      Segmented % 56 %      Immature Grans % 0 %      Lymphocytes % 33 %      Monocytes % 7 %      Eosinophils Relative 4 %      Basophils Relative 0 %      Absolute Neutrophils 3.15 Thousands/µL      Absolute Immature Grans 0.01 Thousand/uL      Absolute Lymphocytes 1.89 Thousands/µL      Absolute Monocytes 0.37 Thousand/µL      Eosinophils Absolute 0.22 Thousand/µL      Basophils Absolute 0.02 Thousands/µL     Blood culture #2 [[id number]] Collected: 06/04/24 1310    Lab Status: In process Specimen: Blood from Arm, Right Updated: 06/04/24 1319    RMSF IGG by IFA-REFLEX [[id number]] Collected: 06/04/24 1310    Lab Status: In process Specimen: Blood from Arm, Right Updated: 06/04/24 1316    Lyme Total AB W Reflex to IGM/IGG [[id number]] Collected: 06/04/24 1310    Lab Status: In process Specimen: Blood from Arm, Right Updated: 06/04/24 1315    Narrative:      The following orders were created for panel order Lyme Total AB W Reflex to IGM/IGG.  Procedure                               Abnormality         Status                     ---------                               -----------         ------                     Lyme Total AB W Reflex t...[[id number]]                      In process                   Please view results for these tests on the individual orders.    Lyme Total AB W Reflex to IGM/IGG [[id number]] Collected: 06/04/24 1310    Lab Status: In process Specimen: Blood from Arm, Right Updated: 06/04/24 1315    Blood culture #1 [[id number]] Collected: 06/04/24 1243    Lab Status: In process Specimen: Blood from Arm, Left Updated: 06/04/24 1245     UA w Reflex to Microscopic w Reflex to Culture [[id number]]     Lab Status: No result Specimen: Urine                    XR chest 1 view portable   ED Interpretation by Julie Lynn Gutzweiler, PA-C (06/04 1400)   No acute cardiopulmonary disease      VAS lower limb venous duplex study, unilateral/limited   ED Interpretation by Julie Lynn Gutzweiler, PA-C (06/04 1313)   Negative for DVT                 Procedures  ECG 12 Lead Documentation Only    Date/Time: 6/4/2024 2:01 PM    Performed by: Julie Lynn Gutzweiler, PA-C  Authorized by: Julie Lynn Gutzweiler, PA-C    Indications / Diagnosis:  Edema  ECG reviewed by me, the ED Provider: yes    Patient location:  ED  Previous ECG:     Previous ECG:  Unavailable    Comparison to cardiac monitor: Yes    Interpretation:     Interpretation: normal    Rate:     ECG rate:  70    ECG rate assessment: normal    Rhythm:     Rhythm: sinus rhythm    Ectopy:     Ectopy: none    QRS:     QRS axis:  Normal    QRS intervals:  Normal  Conduction:     Conduction: normal    ST segments:     ST segments:  Normal  T waves:     T waves: normal    Comments:      No signs of acute ischemia    Independently interpreted by me           ED Course  ED Course as of 06/04/24 1517   Tue Jun 04, 2024   1256 Negative for DVT left leg   1419 I spoke to  Princess Newell from Dermatology.  She doesn't feel that there is anything to worry about.  No fever .  No headache.  No oral lesions.  No palm or sole involvement. She  recommends Tricinolone 0.1% twice daily x 2 weeks.  Ambulatory referral to Dermatology.  Low suspicion for RMSF.     1456 Dermatology called back after the patient left the department.  THEY WOULD LIKE HER TO START DOXYCYCLINE 100 MG TWICE DAILY UNTIL WE RECIEVE THE RESULTS OF THE RMSF.  I made the patient aware.  She will  the prescription  I asked her to stay out of the sun as the antibiotic will make her photosensitive and will cause sunburn.  If she needs to be in the sun, she  needs to apply 30 SPF one hour prior to going out in the sun nad she should reapply every 2 hours while in the sun.Patient understands the plan.                                SBIRT 20yo+      Flowsheet Row Most Recent Value   Initial Alcohol Screen: US AUDIT-C     1. How often do you have a drink containing alcohol? 0 Filed at: 06/04/2024 1004   2. How many drinks containing alcohol do you have on a typical day you are drinking?  0 Filed at: 06/04/2024 1004   3a. Male UNDER 65: How often do you have five or more drinks on one occasion? 0 Filed at: 06/04/2024 1004   3b. FEMALE Any Age, or MALE 65+: How often do you have 4 or more drinks on one occassion? 0 Filed at: 06/04/2024 1004   Audit-C Score 0 Filed at: 06/04/2024 1004   KELLY: How many times in the past year have you...    Used an illegal drug or used a prescription medication for non-medical reasons? Never Filed at: 06/04/2024 1004                      Medical Decision Making  Patient presents emergency room with a recent history of camping.  She noticed swelling in both of her ankles secondary to the bug bites that she felt that she has on her lower legs.  She was seen at the urgent care center and sent to the emergency room for evaluation.  She was seen and evaluated in the emergency room.  She does have a macular rash on both ankles.  She does have +1 pretibial edema.  Her left calf is larger than her right calf on exam.  She does have some calf tenderness upon palpation.  The remainder of her exam is normal.    Her differential diagnosis included but was not limited to leukocytoclastic vasculitis, [place] spotted fever, tickborne this, Lyme disease,, DVT, PE, CHF, myocarditis, pericarditis, acute coronary syndrome.    Personally spoke with dermatology, Princess Newell, regarding this patient.  She recommended after discussing with her attending that the patient take a triamcinolone and apply this topically 0.1% ointment for the next 14 days twice daily.   She also is going to take doxycycline 100 mg twice daily for 7 days until the [place] spotted fever results return.  Patient understands with the plan.  She will follow-up with dermatology.  I put in an ambulatory referral to dermatology we will contact her for an appointment.  If she has any worsening symptoms, these were reviewed with her prior to discharge, she is to return to the emergency room for repeat exam    Amount and/or Complexity of Data Reviewed  Labs: ordered.     Details: Labs were independently interpreted by me  Radiology: ordered and independent interpretation performed.     Details: Portable chest x-ray was negative for any acute cardiopulmonary disease  ECG/medicine tests: ordered and independent interpretation performed.     Details: Normal tracing, no evidence of an arrhythmia or signs of ischemia.  Independently interpreted    Risk  Prescription drug management.             Disposition  Final diagnoses:   Dermatitis - Macular reash both ankles.     Time reflects when diagnosis was documented in both MDM as applicable and the Disposition within this note       Time User Action Codes Description Comment    6/4/2024  2:24 PM Gutzweiler, Julie Add [L30.9] Dermatitis     6/4/2024  2:25 PM Gutzweiler, Julie Modify [L30.9] Dermatitis Macular reash both ankles.          ED Disposition       ED Disposition   Discharge    Condition   Stable    Date/Time   Tue Jun 4, 2024 1424    Comment   Mario Palm discharge to home/self care.                   Follow-up Information    None         Discharge Medication List as of 6/4/2024  2:29 PM        START taking these medications    Details   triamcinolone (KENALOG) 0.1 % ointment Apply topically 2 (two) times a day for 14 days, Starting Tue 6/4/2024, Until Tue 6/18/2024, Normal           CONTINUE these medications which have NOT CHANGED    Details   albuterol (Ventolin HFA) 90 mcg/act inhaler Inhale 2 puffs every 6 (six) hours as needed for wheezing,  Starting Tue 2/20/2024, Normal      ALPRAZolam (XANAX) 1 mg tablet Take 1 mg by mouth daily as needed for anxiety PRN, Starting Wed 6/20/2018, Historical Med      amphetamine-dextroamphetamine (ADDERALL XR) 10 MG 24 hr capsule Historical Med      amphetamine-dextroamphetamine (ADDERALL XR) 30 MG 24 hr capsule Historical Med      doxepin (SINEquan) 25 mg capsule Take 1 capsule by mouth daily at bedtime, Historical Med      DULoxetine (CYMBALTA) 60 mg delayed release capsule Take 120 mg by mouth in the morning, Starting Thu 11/3/2022, Historical Med      hydrOXYzine HCL (ATARAX) 25 mg tablet Take 25 mg by mouth 2 (two) times a day 2 tablets, Starting Tue 2/13/2024, Historical Med      predniSONE 10 mg tablet Take 4 tablets for 3 days then 3 tablets for 3 days then 2 tablet for 3 days 1 for 1 day, Normal      promethazine-dextromethorphan (PHENERGAN-DM) 6.25-15 mg/5 mL oral syrup Take 5 mL by mouth 4 (four) times a day as needed for cough, Starting Tue 2/20/2024, Normal      sulfamethoxazole-trimethoprim (BACTRIM DS) 800-160 mg per tablet TAKE 1 TABLET BY MOUTH TWICE A DAY FOR 3 DAYS, Historical Med      topiramate (TOPAMAX) 100 mg tablet Take 200 mg by mouth in the morning, Starting Thu 2/23/2023, Historical Med      !! traZODone (DESYREL) 100 mg tablet Take 200 mg by mouth daily at bedtime as needed Per pt has 100 mg tablets and  take 200mg, Historical Med      !! traZODone (DESYREL) 150 mg tablet Take  mg by mouth daily at bedtime as needed, Starting Wed 2/14/2024, Historical Med      triamcinolone (KENALOG) 0.025 % cream PLEASE SEE ATTACHED FOR DETAILED DIRECTIONS, Historical Med      !! VYVANSE 30 MG capsule Take 60 mg by mouth in the morning, Starting Fri 5/18/2018, Historical Med      !! Vyvanse 60 MG capsule take 1 capsule by mouth every day in the morning, Historical Med      zolpidem (AMBIEN) 10 mg tablet Take 10 mg by mouth daily at bedtime as needed for sleep, Starting Wed 2/14/2024, Historical Med        !! - Potential duplicate medications found. Please discuss with provider.              PDMP Review       None            ED Provider  Electronically Signed by             Julie Lynn Gutzweiler, PA-C  06/04/24 7631

## 2024-06-04 NOTE — ED NOTES
Pt. Called x2 in waiting area no response.  This RN called pt. At  no answer.      Sugar Gomez, RN  06/04/24 1858

## 2024-06-04 NOTE — PROGRESS NOTES
[facility]        NAME: Mario Palm is a 49 y.o. female  : 1975    MRN: [id number]  DATE: 2024  TIME: 9:37 AM    Assessment and Plan   Swelling of calf [M79.89]  1. Swelling of calf  Transfer to other facility          49-year-old female without any known history of past DVT, coagulopathy, active cancer, kidney heart or lung disease with asymmetric left lower leg swelling as well as numbness.  Below the tibial tubercle left calf measures 5 and half centimeters greater than the right.  It is not tender warm or red but did not have an easily identifiable alternative explanation for leg swelling based on Wells criteria D-dimer might be indicated.  Overall have a low suspicion for DVT but cannot rule this out clinically.  Cannot explain why insect bites in the feet will cause more proximal swelling especially asymmetrically. suspect the numbness in her leg is likely from her reported lumbar degenerative disc disease likely radiculopathy.  Cannot appreciate clearly on exam Dorsey's cyst either that would potentially explain some lower leg swelling.  She has no pain in the knee either.    The patient verbalized understanding of exam findings and treatment plan.   We engaged in the shared decision-making process and treatment options were   discussed at length with the patient.  All questions, concerns and  complaints were answered and addressed to the patient's satisfaction.    Patient Instructions   There are no Patient Instructions on file for this visit.    Follow up with PCP in 3-5 days.  Proceed to  ER if symptoms worsen.    If tests are performed, our office will contact you with results only if   changes need to made to the care plan discussed with you at the visit.   [facility]     Chief Complaint     Chief Complaint   Patient presents with   • Insect Bite     Pt states she got bug bites on her b/l legs that are swelling         History of  Present Illness       HPI  On Saturday pt reports bug bites b/l feet, bits are getting better, left worse than right. Pt reports tingling in the toes, wearing shoes makes it worse. Also reports tingling in left upper. No history of CNS disease or peripheral neuropathy. Unaddressed back pain, back hurting her right now but this is chronic, also notes this radicular pain are chronic.     Review of Systems   Review of Systems  All other related systems reviewed and are negative except as noted in HPI    Current Medications       Current Outpatient Medications:   •  albuterol (Ventolin HFA) 90 mcg/act inhaler, Inhale 2 puffs every 6 (six) hours as needed for wheezing, Disp: 18 g, Rfl: 5  •  ALPRAZolam (XANAX) 1 mg tablet, Take 1 mg by mouth daily as needed for anxiety PRN, Disp: , Rfl: 0  •  amphetamine-dextroamphetamine (ADDERALL XR) 10 MG 24 hr capsule, , Disp: , Rfl:   •  amphetamine-dextroamphetamine (ADDERALL XR) 30 MG 24 hr capsule, , Disp: , Rfl:   •  DULoxetine (CYMBALTA) 60 mg delayed release capsule, Take 120 mg by mouth in the morning, Disp: , Rfl:   •  hydrOXYzine HCL (ATARAX) 25 mg tablet, Take 25 mg by mouth 2 (two) times a day 2 tablets, Disp: , Rfl:   •  topiramate (TOPAMAX) 100 mg tablet, Take 200 mg by mouth in the morning, Disp: , Rfl:   •  traZODone (DESYREL) 100 mg tablet, Take 200 mg by mouth daily at bedtime as needed Per pt has 100 mg tablets and  take 200mg, Disp: , Rfl:   •  zolpidem (AMBIEN) 10 mg tablet, Take 10 mg by mouth daily at bedtime as needed for sleep, Disp: , Rfl:   •  doxepin (SINEquan) 25 mg capsule, Take 1 capsule by mouth daily at bedtime (Patient not taking: Reported on 6/4/2024), Disp: , Rfl:   •  predniSONE 10 mg tablet, Take 4 tablets for 3 days then 3 tablets for 3 days then 2 tablet for 3 days 1 for 1 day (Patient not taking: Reported on 6/4/2024), Disp: 28 tablet, Rfl: 0  •  promethazine-dextromethorphan (PHENERGAN-DM) 6.25-15 mg/5 mL oral syrup, Take 5 mL by mouth 4  (four) times a day as needed for cough (Patient not taking: Reported on 2024), Disp: 118 mL, Rfl: 0  •  sulfamethoxazole-trimethoprim (BACTRIM DS) 800-160 mg per tablet, TAKE 1 TABLET BY MOUTH TWICE A DAY FOR 3 DAYS (Patient not taking: Reported on 2024), Disp: , Rfl:   •  traZODone (DESYREL) 150 mg tablet, Take  mg by mouth daily at bedtime as needed, Disp: , Rfl:   •  triamcinolone (KENALOG) 0.025 % cream, PLEASE SEE ATTACHED FOR DETAILED DIRECTIONS (Patient not taking: Reported on 2024), Disp: , Rfl:   •  VYVANSE 30 MG capsule, Take 60 mg by mouth in the morning, Disp: , Rfl: 0  •  Vyvanse 60 MG capsule, take 1 capsule by mouth every day in the morning (Patient not taking: Reported on 2024), Disp: , Rfl:     Current Allergies     Allergies as of 2024   • (No Known Allergies)            The following portions of the patient's history were reviewed and updated as appropriate: allergies, current medications, past family history, past medical history, past social history, past surgical history and problem list.     Past Medical History:   Diagnosis Date   • Anxiety    • Chronic post-traumatic stress disorder (PTSD)    • Hypertension    • Obesity        Past Surgical History:   Procedure Laterality Date   • BREAST BIOPSY Left 2012    benign   •  SECTION     • CYST REMOVAL      on back.       Family History   Problem Relation Age of Onset   • Heart failure Mother    • Heart failure Father    • No Known Problems Daughter    • No Known Problems Daughter    • Breast cancer Maternal Grandmother 60   • No Known Problems Maternal Grandfather    • Breast cancer Paternal Grandmother 60   • No Known Problems Paternal Grandfather    • Coronary artery disease Family    • Heart attack Family    • No Known Problems Maternal Aunt          Medications have been verified.        Objective   /88   Pulse 64   Temp 97.5 °F (36.4 °C)   Resp 18   Wt 112 kg (247 lb)   SpO2 96%   BMI  "37.56 kg/m²   No LMP recorded.       Physical Exam     Physical Exam  Constitutional:       General: She is not in acute distress.     Appearance: She is well-developed.   HENT:      Head: Normocephalic and atraumatic.   Eyes:      General: No scleral icterus.     Conjunctiva/sclera: Conjunctivae normal.   Neck:      Trachea: No tracheal deviation.   Pulmonary:      Effort: Pulmonary effort is normal. No respiratory distress.      Breath sounds: No stridor.   Musculoskeletal:         General: Swelling present.      Cervical back: Normal range of motion.      Comments: Appears to be several small papules likely insect bites on the bilateral feet dorsal feet and right lateral ankle.  There is no pitting edema.  There is no erythema or warmth surrounding these papules.  There is no erythema or warmth throughout the legs.  There is no tenderness along deep venous system in the left however the left calf 10 cm below the tibial tubercle measures 5 and half centimeters greater than the right lower leg.  Sensation is dulled to light touch compared to the right leg in the anterior and posterior leg and no specific dermatomal pattern.  Palpable DP pulses bilaterally.   Skin:     General: Skin is warm and dry.      Findings: No erythema.   Neurological:      Mental Status: She is alert and oriented to person, place, and time.   Psychiatric:         Behavior: Behavior normal.       Ortho Exam        Procedures  No Procedures performed today        Note: Portions of this record may have been created with voice recognition software. Occasional wrong word or \"sound a like\" substitutions may have occurred due to the inherent limitations of voice recognition software. Please read the chart carefully and recognize, using context, where substitutions have occurred.*      "
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
## 2024-06-04 NOTE — DISCHARGE INSTRUCTIONS
Return to the emergency room if your symptoms worsen.  Return if you have sores in your mouth or noticed a rash on the palms or soles of your feet.  If you start to develop fever or have headaches, return to the emergency room for repeat exam.    Apply the triamcinolone ointment twice daily for 2 weeks.    Follow up with the dermatologist.  I gave you an ambulatory referral for follow-up    Take Doxycycline twice daily until test results return.

## 2024-06-05 LAB
ATRIAL RATE: 70 BPM
P AXIS: -2 DEGREES
PR INTERVAL: 162 MS
QRS AXIS: 21 DEGREES
QRSD INTERVAL: 80 MS
QT INTERVAL: 400 MS
QTC INTERVAL: 432 MS
T WAVE AXIS: 17 DEGREES
VENTRICULAR RATE: 70 BPM

## 2024-06-05 PROCEDURE — 93010 ELECTROCARDIOGRAM REPORT: CPT | Performed by: INTERNAL MEDICINE

## 2024-06-09 LAB
BACTERIA BLD CULT: NORMAL
BACTERIA BLD CULT: NORMAL

## 2024-06-19 LAB
RESULT/COMMENT: NORMAL
RICK SF IGG TITR SER IF: NORMAL {TITER}
RICK SF IGM TITR SER IF: NORMAL {TITER}

## 2025-01-15 ENCOUNTER — TELEPHONE (OUTPATIENT)
Age: 50
End: 2025-01-15

## 2025-01-15 NOTE — TELEPHONE ENCOUNTER
Patient called to report that they were at [facility] on 1/12/2025 for a Fall and they found a Suspicion of a trace subarachnoid hemorrhage along the right sylvian fissure.       Patient not presently having any symptoms but left the hospital AMA and now concerned about the Small bleed.      Patient looking to be seen by a neurologist ASAP     Discussed some open appointments and patient felt that they will go to  the nearest [facility] ED to be evaluated  and will call us back to set up a future appointment if needed

## 2025-01-17 ENCOUNTER — APPOINTMENT (EMERGENCY)
Dept: CT IMAGING | Facility: HOSPITAL | Age: 50
End: 2025-01-17
Payer: COMMERCIAL

## 2025-01-17 ENCOUNTER — HOSPITAL ENCOUNTER (EMERGENCY)
Facility: HOSPITAL | Age: 50
Discharge: HOME | End: 2025-01-17
Attending: EMERGENCY MEDICINE
Payer: COMMERCIAL

## 2025-01-17 ENCOUNTER — OFFICE VISIT (OUTPATIENT)
Age: 50
End: 2025-01-17
Payer: COMMERCIAL

## 2025-01-17 VITALS
TEMPERATURE: 98 F | HEART RATE: 71 BPM | DIASTOLIC BLOOD PRESSURE: 92 MMHG | RESPIRATION RATE: 18 BRPM | SYSTOLIC BLOOD PRESSURE: 193 MMHG | OXYGEN SATURATION: 98 %

## 2025-01-17 VITALS
HEIGHT: 68 IN | DIASTOLIC BLOOD PRESSURE: 76 MMHG | WEIGHT: 247 LBS | OXYGEN SATURATION: 100 % | SYSTOLIC BLOOD PRESSURE: 138 MMHG | BODY MASS INDEX: 37.44 KG/M2 | HEART RATE: 80 BPM

## 2025-01-17 DIAGNOSIS — S09.90XA INJURY OF HEAD, INITIAL ENCOUNTER: Primary | ICD-10-CM

## 2025-01-17 DIAGNOSIS — S06.6X1S TRAUMATIC SUBARACHNOID HEMORRHAGE WITH LOSS OF CONSCIOUSNESS OF 30 MINUTES OR LESS, SEQUELA (HCC): Primary | ICD-10-CM

## 2025-01-17 PROCEDURE — 99214 OFFICE O/P EST MOD 30 MIN: CPT

## 2025-01-17 PROCEDURE — 99284 EMERGENCY DEPT VISIT MOD MDM: CPT

## 2025-01-17 PROCEDURE — 99284 EMERGENCY DEPT VISIT MOD MDM: CPT | Performed by: EMERGENCY MEDICINE

## 2025-01-17 PROCEDURE — 70450 CT HEAD/BRAIN W/O DYE: CPT
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
## 2025-01-17 NOTE — Clinical Note
Mario Palm was seen and treated in our emergency department on 1/17/2025.                Diagnosis:     Mario  may return to work on return date.    She may return on this date: 01/20/2025         If you have any questions or concerns, please don't hesitate to call.      Dada Longo MD    ______________________________           _______________          _______________  Hospital Representative                              Date                                Time

## 2025-01-22 ENCOUNTER — TELEPHONE (OUTPATIENT)
Age: 50
End: 2025-01-22

## 2025-01-22 NOTE — TELEPHONE ENCOUNTER
Pt called stating she is traveling to [facility] on 2/3/25 and going on a cruise in March. Pt is interested in getting the shingles, flu and covid  vaccines prior to these trips.  Asking if this can be done in the office?    Please advise.    Pt is scheduled for her annual well visit on 2/24/25

## 2025-02-14 ENCOUNTER — OFFICE VISIT (OUTPATIENT)
Dept: BARIATRICS | Facility: CLINIC | Age: 50
End: 2025-02-14
Payer: COMMERCIAL

## 2025-02-14 VITALS
SYSTOLIC BLOOD PRESSURE: 142 MMHG | WEIGHT: 240.4 LBS | RESPIRATION RATE: 16 BRPM | HEIGHT: 69 IN | HEART RATE: 84 BPM | BODY MASS INDEX: 35.6 KG/M2 | DIASTOLIC BLOOD PRESSURE: 90 MMHG

## 2025-02-14 DIAGNOSIS — E28.2 POLYCYSTIC OVARIAN SYNDROME: ICD-10-CM

## 2025-02-14 DIAGNOSIS — Z12.11 COLON CANCER SCREENING: ICD-10-CM

## 2025-02-14 DIAGNOSIS — Z12.11 ENCOUNTER FOR SCREENING COLONOSCOPY: ICD-10-CM

## 2025-02-14 DIAGNOSIS — G47.33 OBSTRUCTIVE SLEEP APNEA: ICD-10-CM

## 2025-02-14 DIAGNOSIS — E66.812 OBESITY, CLASS II, BMI 35-39.9: Primary | ICD-10-CM

## 2025-02-14 PROCEDURE — 99204 OFFICE O/P NEW MOD 45 MIN: CPT | Performed by: SURGERY

## 2025-02-14 RX ORDER — QUETIAPINE FUMARATE 50 MG/1
TABLET, FILM COATED ORAL
COMMUNITY
Start: 2025-02-04

## 2025-02-14 RX ORDER — VORTIOXETINE 5 MG/1
TABLET, FILM COATED ORAL
COMMUNITY
Start: 2025-02-11

## 2025-02-14 RX ORDER — IBUPROFEN 800 MG/1
TABLET, FILM COATED ORAL
COMMUNITY
Start: 2025-01-25

## 2025-02-14 NOTE — PROGRESS NOTES
BARIATRIC INITIAL CONSULT - BARIATRIC SURGERY    Mario Palm 49 y.o. female MRN: [id number]  Unit/Bed#:  Encounter: [id number]      HPI:  Mario Palm is a 49 y.o. female who presents with a longstanding history of morbid obesity and inability to sustain a meaningful weight loss.  She is .  She desires to pursue metabolic emergent surgery to improve her health.  She denies GERD.  She denies DVT/PE.  Denies tobacco.  Rare NSAIDs.  Here today to discuss bariatric options.    Visit type: initial visit    Symptoms: excess weight    Associated Symptoms: none    Associated Conditions: sleep apnea  Disease Complications: sleep apnea  Weight Loss Interest: high    Exercise Frequency:daily  Types of Exercise: walking      Review of Systems   Respiratory:  Negative for shortness of breath.    All other systems reviewed and are negative.      Historical Information   Past Medical History:   Diagnosis Date    Anxiety     Chronic post-traumatic stress disorder (PTSD)     Hypertension     Obesity     Sleep apnea      Past Surgical History:   Procedure Laterality Date    BREAST BIOPSY Left 2012    benign     SECTION      CYST REMOVAL      on back.     Social History   Social History     Substance and Sexual Activity   Alcohol Use Yes    Comment: socially     Social History     Substance and Sexual Activity   Drug Use Yes    Types: Marijuana    Comment: medical marijuana card     Social History     Tobacco Use   Smoking Status Former    Current packs/day: 0.00    Average packs/day: 1 pack/day for 16.0 years (16.0 ttl pk-yrs)    Types: Cigarettes    Start date:     Quit date:     Years since quittin.1   Smokeless Tobacco Never     Family History: Family history non-contributory    Meds/Allergies   all medications and allergies reviewed  No Known Allergies    Objective       Current Vitals:   /90 (BP Location: Left arm, Patient Position: Sitting, Cuff Size: Large)   Pulse 84   Resp 16   " Ht 5' 8.86\" (1.749 m)   Wt 109 kg (240 lb 6.4 oz)   BMI 35.65 kg/m²       Invasive Devices       None                   Physical Exam  Constitutional:       Appearance: Normal appearance.   HENT:      Head: Atraumatic.      Nose: No rhinorrhea.   Eyes:      Extraocular Movements: Extraocular movements intact.   Cardiovascular:      Rate and Rhythm: Normal rate.   Pulmonary:      Effort: Pulmonary effort is normal. No respiratory distress.   Abdominal:      General: Abdomen is flat. There is no distension.   Musculoskeletal:         General: Normal range of motion.      Cervical back: Normal range of motion.   Skin:     General: Skin is warm and dry.   Neurological:      General: No focal deficit present.      Mental Status: She is alert and oriented to person, place, and time.   Psychiatric:         Mood and Affect: Mood normal.         Behavior: Behavior normal.         Lab Results: I have personally reviewed pertinent lab results.    Imaging: Results Review Statement: No pertinent imaging studies reviewed.  EKG, Pathology, and Other Studies: Results Review Statement: No pertinent imaging studies reviewed.      Assessment/PLAN:    Obesity, Class II, BMI 35-39.9  49 y.o. yo female with a long standing h/o of obesity and inability to sustain any meaningful weight loss on her own despite several attempts.    She is interested in the Laparoscopic sleeve gastrectomy and Swapna-en-Y gastric bypass.    As a part of her pre op evaluation, she will be referred to a cardiologist and for a sleep evaluation and consult after successfully completing an evaluation with our pre-certification/, registered dietician and licensed clinical .    She needs an EGD to evaluate the anatomy of her GI tract prior to the operation.  I have spent over 45 minutes with her face to face in the office today discussing her options and details of the surgery.Over 50% of this was coordinating care.    She was given " the opportunity to ask questions and I have answered all of them.  I have discussed and educated the patient with regards to the components of our multidisciplinary program and the importance of compliance and follow up in the post operative period. The patient was also instructed with regards to the importance of behavior modification, nutritional counseling, support meeting attendance and lifestyle changes that are important to ensure success.     Although there is a great statistical chance of improvement or even resolution of most of her associated comorbidities, the results vary from patient to patient and they largely depend on her commitment and compliance.         Obstructive sleep apnea  Compliant with CPAP  Continue care per treatment team        Kerri Valdez MD  2/14/2025  3:02 PM

## 2025-02-14 NOTE — ASSESSMENT & PLAN NOTE
49 y.o. yo female with a long standing h/o of obesity and inability to sustain any meaningful weight loss on her own despite several attempts.    She is interested in the Laparoscopic sleeve gastrectomy and Swapna-en-Y gastric bypass.    As a part of her pre op evaluation, she will be referred to a cardiologist and for a sleep evaluation and consult after successfully completing an evaluation with our pre-certification/, registered dietician and licensed clinical .    She needs an EGD to evaluate the anatomy of her GI tract prior to the operation.  I have spent over 45 minutes with her face to face in the office today discussing her options and details of the surgery.Over 50% of this was coordinating care.    She was given the opportunity to ask questions and I have answered all of them.  I have discussed and educated the patient with regards to the components of our multidisciplinary program and the importance of compliance and follow up in the post operative period. The patient was also instructed with regards to the importance of behavior modification, nutritional counseling, support meeting attendance and lifestyle changes that are important to ensure success.     Although there is a great statistical chance of improvement or even resolution of most of her associated comorbidities, the results vary from patient to patient and they largely depend on her commitment and compliance.

## 2025-02-14 NOTE — LETTER
2025     Alfa Harris MD  [address]  [address]  [place] [place] [facility] [address]    Patient: Mario Palm   YOB: 1975   Date of Visit: 2025       Dear Dr. Harris:    Thank you for referring Mario Palm to me for evaluation for metabolic and bariatric surgery. Below are my notes for this consultation.    If you have questions, please do not hesitate to call me. I look forward to following your patient along with you.         Sincerely,        Kerri Valdez MD        CC: No Recipients    Kerri Valdez MD  2025  3:06 PM  Sign when Signing Visit      BARIATRIC INITIAL CONSULT - BARIATRIC SURGERY    Mario Palm 49 y.o. female MRN: [id number]  Unit/Bed#:  Encounter: [id number]      HPI:  Mario Palm is a 49 y.o. female who presents with a longstanding history of morbid obesity and inability to sustain a meaningful weight loss.  She is .  She desires to pursue metabolic emergent surgery to improve her health.  She denies GERD.  She denies DVT/PE.  Denies tobacco.  Rare NSAIDs.  Here today to discuss bariatric options.    Visit type: initial visit    Symptoms: excess weight    Associated Symptoms: none    Associated Conditions: sleep apnea  Disease Complications: sleep apnea  Weight Loss Interest: high    Exercise Frequency:daily  Types of Exercise: walking      Review of Systems   Respiratory:  Negative for shortness of breath.    All other systems reviewed and are negative.      Historical Information  Past Medical History:   Diagnosis Date   • Anxiety    • Chronic post-traumatic stress disorder (PTSD)    • Hypertension    • Obesity    • Sleep apnea      Past Surgical History:   Procedure Laterality Date   • BREAST BIOPSY Left 2012    benign   •  SECTION     • CYST REMOVAL      on back.     Social History  Social History     Substance and Sexual Activity   Alcohol Use Yes    Comment: socially     Social History     Substance and Sexual Activity   Drug  "Use Yes   • Types: Marijuana    Comment: medical marijuana card     Social History     Tobacco Use   Smoking Status Former   • Current packs/day: 0.00   • Average packs/day: 1 pack/day for 16.0 years (16.0 ttl pk-yrs)   • Types: Cigarettes   • Start date:    • Quit date:    • Years since quittin.1   Smokeless Tobacco Never     Family History: Family history non-contributory    Meds/Allergies  all medications and allergies reviewed  No Known Allergies    Objective      Current Vitals:   /90 (BP Location: Left arm, Patient Position: Sitting, Cuff Size: Large)   Pulse 84   Resp 16   Ht 5' 8.86\" (1.749 m)   Wt 109 kg (240 lb 6.4 oz)   BMI 35.65 kg/m²       Invasive Devices       None                   Physical Exam  Constitutional:       Appearance: Normal appearance.   HENT:      Head: Atraumatic.      Nose: No rhinorrhea.   Eyes:      Extraocular Movements: Extraocular movements intact.   Cardiovascular:      Rate and Rhythm: Normal rate.   Pulmonary:      Effort: Pulmonary effort is normal. No respiratory distress.   Abdominal:      General: Abdomen is flat. There is no distension.   Musculoskeletal:         General: Normal range of motion.      Cervical back: Normal range of motion.   Skin:     General: Skin is warm and dry.   Neurological:      General: No focal deficit present.      Mental Status: She is alert and oriented to person, place, and time.   Psychiatric:         Mood and Affect: Mood normal.         Behavior: Behavior normal.         Lab Results: I have personally reviewed pertinent lab results.    Imaging: Results Review Statement: No pertinent imaging studies reviewed.  EKG, Pathology, and Other Studies: Results Review Statement: No pertinent imaging studies reviewed.      Assessment/PLAN:    Obesity, Class II, BMI 35-39.9  49 y.o. yo female with a long standing h/o of obesity and inability to sustain any meaningful weight loss on her own despite several attempts.    She is " interested in the Laparoscopic sleeve gastrectomy and Swapna-en-Y gastric bypass.    As a part of her pre op evaluation, she will be referred to a cardiologist and for a sleep evaluation and consult after successfully completing an evaluation with our pre-certification/, registered dietician and licensed clinical .    She needs an EGD to evaluate the anatomy of her GI tract prior to the operation.  I have spent over 45 minutes with her face to face in the office today discussing her options and details of the surgery.Over 50% of this was coordinating care.    She was given the opportunity to ask questions and I have answered all of them.  I have discussed and educated the patient with regards to the components of our multidisciplinary program and the importance of compliance and follow up in the post operative period. The patient was also instructed with regards to the importance of behavior modification, nutritional counseling, support meeting attendance and lifestyle changes that are important to ensure success.     Although there is a great statistical chance of improvement or even resolution of most of her associated comorbidities, the results vary from patient to patient and they largely depend on her commitment and compliance.         Obstructive sleep apnea  Compliant with CPAP  Continue care per treatment team        Kerri Valdez MD  2/14/2025  3:02 PM

## 2025-03-04 ENCOUNTER — TELEPHONE (OUTPATIENT)
Dept: BARIATRICS | Facility: CLINIC | Age: 50
End: 2025-03-04

## 2025-03-07 ENCOUNTER — TELEPHONE (OUTPATIENT)
Dept: BARIATRICS | Facility: CLINIC | Age: 50
End: 2025-03-07

## 2025-03-07 NOTE — TELEPHONE ENCOUNTER
Patient of . was seen 2/14/25. Would now like to schedule a MWM appointment. If you could assist her with this. Thank you

## 2025-03-11 ENCOUNTER — TELEMEDICINE (OUTPATIENT)
Dept: BARIATRICS | Facility: CLINIC | Age: 50
End: 2025-03-11

## 2025-03-11 DIAGNOSIS — Z98.84 BARIATRIC SURGERY STATUS: Primary | ICD-10-CM

## 2025-03-11 PROCEDURE — RECHECK

## 2025-03-13 DIAGNOSIS — E66.812 OBESITY, CLASS II, BMI 35-39.9: Primary | ICD-10-CM

## 2025-03-21 PROBLEM — G89.4 CHRONIC PAIN DISORDER: Status: ACTIVE | Noted: 2025-03-21

## 2025-03-21 PROBLEM — D17.9 LIPOMA: Status: RESOLVED | Noted: 2023-05-28 | Resolved: 2025-03-21

## 2025-03-21 PROBLEM — N92.6 IRREGULAR PERIODS: Status: RESOLVED | Noted: 2023-05-28 | Resolved: 2025-03-21

## 2025-03-21 PROBLEM — G56.00 CARPAL TUNNEL SYNDROME: Status: ACTIVE | Noted: 2025-03-21

## 2025-03-21 PROBLEM — M47.817 LUMBOSACRAL SPONDYLOSIS WITHOUT MYELOPATHY: Status: ACTIVE | Noted: 2025-03-21

## 2025-03-21 PROBLEM — M25.561 PAIN IN RIGHT KNEE: Status: RESOLVED | Noted: 2019-01-28 | Resolved: 2025-03-21

## 2025-03-21 PROBLEM — L30.9 DERMATITIS: Status: RESOLVED | Noted: 2024-06-04 | Resolved: 2025-03-21
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
## 2025-03-21 PROBLEM — R54 AGE-RELATED PHYSICAL DEBILITY: Status: RESOLVED | Noted: 2025-03-21 | Resolved: 2025-03-21

## 2025-03-21 PROBLEM — L65.9 HAIR LOSS: Status: RESOLVED | Noted: 2023-05-28 | Resolved: 2025-03-21

## 2025-03-24 ENCOUNTER — TELEPHONE (OUTPATIENT)
Dept: OTHER | Facility: OTHER | Age: 50
End: 2025-03-24

## 2025-03-24 NOTE — TELEPHONE ENCOUNTER
Patient is calling regarding cancelling an appointment.    Date/Time: 3/24 8:40    Patient was rescheduled: YES [] NO [x]    Patient requesting call back to reschedule: YES [x] NO []

## 2025-04-11 ENCOUNTER — ANNUAL EXAM (OUTPATIENT)
Dept: OBGYN CLINIC | Facility: CLINIC | Age: 50
End: 2025-04-11
Payer: COMMERCIAL

## 2025-04-11 VITALS
SYSTOLIC BLOOD PRESSURE: 162 MMHG | WEIGHT: 240 LBS | BODY MASS INDEX: 35.55 KG/M2 | DIASTOLIC BLOOD PRESSURE: 84 MMHG | HEIGHT: 69 IN

## 2025-04-11 DIAGNOSIS — Z12.4 CERVICAL CANCER SCREENING: ICD-10-CM

## 2025-04-11 DIAGNOSIS — Z12.31 ENCOUNTER FOR SCREENING MAMMOGRAM FOR BREAST CANCER: ICD-10-CM

## 2025-04-11 DIAGNOSIS — Z12.39 ENCOUNTER FOR SCREENING BREAST EXAMINATION: ICD-10-CM

## 2025-04-11 DIAGNOSIS — Z78.0 POSTMENOPAUSAL: ICD-10-CM

## 2025-04-11 DIAGNOSIS — N95.1 VASOMOTOR SYMPTOMS DUE TO MENOPAUSE: ICD-10-CM

## 2025-04-11 DIAGNOSIS — Z11.51 SCREENING FOR HPV (HUMAN PAPILLOMAVIRUS): ICD-10-CM

## 2025-04-11 DIAGNOSIS — Z01.419 ROUTINE GYNECOLOGICAL EXAMINATION: ICD-10-CM

## 2025-04-11 DIAGNOSIS — Z12.4 SCREENING FOR MALIGNANT NEOPLASM OF THE CERVIX: ICD-10-CM

## 2025-04-11 DIAGNOSIS — Z01.419 ENCOUNTER FOR WELL WOMAN EXAM: Primary | ICD-10-CM

## 2025-04-11 PROCEDURE — G0476 HPV COMBO ASSAY CA SCREEN: HCPCS | Performed by: PHYSICIAN ASSISTANT

## 2025-04-11 PROCEDURE — 99396 PREV VISIT EST AGE 40-64: CPT | Performed by: PHYSICIAN ASSISTANT

## 2025-04-11 PROCEDURE — G0145 SCR C/V CYTO,THINLAYER,RESCR: HCPCS | Performed by: PHYSICIAN ASSISTANT

## 2025-04-11 RX ORDER — ESTRADIOL/NORETHINDRONE ACETATE TRANSDERMAL SYSTEM .05; .25 MG/D; MG/D
1 PATCH, EXTENDED RELEASE TRANSDERMAL 2 TIMES WEEKLY
Qty: 8 PATCH | Refills: 5 | Status: SHIPPED | OUTPATIENT
Start: 2025-04-14

## 2025-04-11 NOTE — PROGRESS NOTES
:  Assessment & Plan  Encounter for well woman exam         Encounter for screening breast examination         Cervical cancer screening         Screening for HPV (human papillomavirus)    Orders:    Liquid-based pap, screening    Screening for malignant neoplasm of the cervix    Orders:    Liquid-based pap, screening    Routine gynecological examination    Orders:    Liquid-based pap, screening    Encounter for screening mammogram for breast cancer    Orders:    Mammo screening bilateral w 3d and cad; Future    Postmenopausal    Orders:    estradiol-norethindrone (CombiPatch) 0.05-0.25 MG/DAY; Place 1 patch on the skin 2 (two) times a week    Ambulatory Referral to Obstetrics / Gynecology; Future    Vasomotor symptoms due to menopause    Orders:    estradiol-norethindrone (CombiPatch) 0.05-0.25 MG/DAY; Place 1 patch on the skin 2 (two) times a week    Ambulatory Referral to Obstetrics / Gynecology; Future        History of Present Illness     Mario Palm is a 50 y.o. female   Pt presents for her annual exam today--  She has no complaints except vasomotors sxs, dryness  Is interested in HRT  She has no bleeding or pelvic pain x few years  Bowel and bladder are regular  Colonoscopy--  No breast concerns today  Last mammo--22    pap today.    Rx mammo  Rx Combipatch .05mg  Daily ca, d  Nsaids prn      Review of Systems   Constitutional:  Negative for chills, fever and unexpected weight change.   HENT:  Negative for ear pain and sore throat.    Eyes:  Negative for pain and visual disturbance.   Respiratory:  Negative for cough and shortness of breath.    Cardiovascular:  Negative for chest pain and palpitations.   Gastrointestinal:  Negative for abdominal pain, blood in stool, constipation, diarrhea and vomiting.   Genitourinary: Negative.  Negative for dysuria and hematuria.   Musculoskeletal:  Negative for arthralgias and back pain.   Skin:  Negative for color change and rash.   Neurological:  Negative for seizures  "and syncope.   All other systems reviewed and are negative.    Objective   /84   Ht 5' 8.5\" (1.74 m)   Wt 109 kg (240 lb)   LMP 04/29/2021 (Exact Date)   BMI 35.96 kg/m²      Physical Exam  Vitals and nursing note reviewed.   Constitutional:       General: She is not in acute distress.     Appearance: She is well-developed.   HENT:      Head: Normocephalic and atraumatic.   Eyes:      Conjunctiva/sclera: Conjunctivae normal.   Cardiovascular:      Rate and Rhythm: Normal rate and regular rhythm.      Heart sounds: No murmur heard.  Pulmonary:      Effort: Pulmonary effort is normal. No respiratory distress.      Breath sounds: Normal breath sounds.   Chest:   Breasts:     Right: Normal.      Left: Normal.   Abdominal:      Palpations: Abdomen is soft.      Tenderness: There is no abdominal tenderness.   Genitourinary:     General: Normal vulva.      Vagina: Normal.      Uterus: Normal.       Adnexa: Right adnexa normal and left adnexa normal.   Musculoskeletal:         General: No swelling.      Cervical back: Neck supple.   Skin:     General: Skin is warm and dry.      Capillary Refill: Capillary refill takes less than 2 seconds.   Neurological:      Mental Status: She is alert.   Psychiatric:         Mood and Affect: Mood normal.           "

## 2025-04-14 LAB
HPV HR 12 DNA CVX QL NAA+PROBE: NEGATIVE
HPV16 DNA CVX QL NAA+PROBE: NEGATIVE
HPV18 DNA CVX QL NAA+PROBE: NEGATIVE

## 2025-04-16 LAB
LAB AP GYN PRIMARY INTERPRETATION: NORMAL
Lab: NORMAL

## 2025-04-21 ENCOUNTER — RESULTS FOLLOW-UP (OUTPATIENT)
Dept: OBGYN CLINIC | Facility: CLINIC | Age: 50
End: 2025-04-21

## 2025-04-22 ENCOUNTER — PREP FOR PROCEDURE (OUTPATIENT)
Age: 50
End: 2025-04-22

## 2025-04-22 ENCOUNTER — OFFICE VISIT (OUTPATIENT)
Age: 50
End: 2025-04-22
Payer: COMMERCIAL

## 2025-04-22 VITALS
BODY MASS INDEX: 36.23 KG/M2 | WEIGHT: 244.6 LBS | SYSTOLIC BLOOD PRESSURE: 142 MMHG | DIASTOLIC BLOOD PRESSURE: 88 MMHG | HEIGHT: 69 IN | OXYGEN SATURATION: 96 % | HEART RATE: 95 BPM

## 2025-04-22 DIAGNOSIS — Z01.818 PREOPERATIVE EVALUATION TO RULE OUT SURGICAL CONTRAINDICATION: Primary | ICD-10-CM

## 2025-04-22 DIAGNOSIS — Z12.11 COLON CANCER SCREENING: ICD-10-CM

## 2025-04-22 DIAGNOSIS — E66.812 OBESITY, CLASS II, BMI 35-39.9: ICD-10-CM

## 2025-04-22 PROCEDURE — 99204 OFFICE O/P NEW MOD 45 MIN: CPT | Performed by: PHYSICIAN ASSISTANT

## 2025-04-22 RX ORDER — VORTIOXETINE 10 MG/1
10 TABLET, FILM COATED ORAL
COMMUNITY
Start: 2025-04-14

## 2025-04-22 RX ORDER — SODIUM CHLORIDE, SODIUM LACTATE, POTASSIUM CHLORIDE, CALCIUM CHLORIDE 600; 310; 30; 20 MG/100ML; MG/100ML; MG/100ML; MG/100ML
125 INJECTION, SOLUTION INTRAVENOUS CONTINUOUS
Status: CANCELLED | OUTPATIENT
Start: 2025-04-22

## 2025-04-22 NOTE — H&P (VIEW-ONLY)
Name: Mario Palm      : 1975      MRN: [id number]  Encounter Provider: Amie Mcintosh PA-C  Encounter Date: 2025   Encounter department: [facility] [place]  :  Assessment & Plan  Preoperative evaluation to rule out surgical contraindication    Obesity, Class II, BMI 35-39.9    Colon cancer screening    Patient presents to schedule an EGD prior to bariatric surgery/gastric bypass and screening colonoscopy.  She reports a grandfather with colon cancer.  She reports a history of a HH in  on EGD.    Will plan for EGD to evaluate the anatomy and bx for h pylori and colonoscopy to investigate.      History of Present Illness   HPI  Mario Palm is a 50 y.o. female who presents to the office to schedule an EGD prior to bariatric surgery/gastric bypass and a screening colonoscopy.  She reports a grandfather with colon cancer.  She reports a history of a hiatal hernia on EGD in .  No issues with heartburn or dysphagia.  She denies any GI symptoms.  No blood in the stool.    I discussed informed consent with the patient for the EGD and colonoscopy. The risks/benefits of the procedure were discussed with the patient. Risks included, but not limited to, infection, bleeding, perforation were discussed. Patient was agreeable.   History obtained from: patient    Review of Systems   Constitutional: Negative.    HENT: Negative.     Eyes: Negative.    Respiratory: Negative.     Cardiovascular: Negative.    Gastrointestinal: Negative.    Genitourinary: Negative.    Musculoskeletal: Negative.    Skin: Negative.    Neurological: Negative.    Hematological: Negative.    Psychiatric/Behavioral: Negative.       Medical History Reviewed by provider this encounter:  Meds     .  Past Medical History   Past Medical History:   Diagnosis Date    Age-related physical debility 2025    Anxiety     Arthritis     Chronic post-traumatic stress disorder (PTSD)     Depression      Dermatitis 2024    Endometriosis     Fall 2025    Female infertility     Fibrocystic breast 2012    Fibroid     Hair loss 2023    Headache(784.0)     Hypertension     Memory loss     Migraine     Obesity     Pain in right knee 2019    No injury      Polycystic ovary syndrome     Scoliosis     Sleep apnea      Past Surgical History:   Procedure Laterality Date    BACK SURGERY  2025    Lower Back Ablation    BREAST BIOPSY Left 2012    benign     SECTION      CYST REMOVAL      on back.    MYOMECTOMY       Family History   Problem Relation Age of Onset    Heart failure Mother     Breast cancer Mother         unsure of hx, lump removed    Mental illness Mother     Arthritis Mother     Early death Mother     Substance Abuse Mother     Psychiatric Illness Mother     Heart failure Father     Early death Father     No Known Problems Daughter     No Known Problems Daughter     Breast cancer Maternal Grandmother 60        passed away  from breast cancer    Mental illness Maternal Grandmother     Psychiatric Illness Maternal Grandmother     Cancer Maternal Grandfather     Heart disease Maternal Grandfather     Breast cancer Paternal Grandmother         unsure of hx    Cancer Paternal Grandmother     Glaucoma Paternal Grandfather     Coronary artery disease Family     Heart attack Family     No Known Problems Maternal Aunt     Heart failure Maternal Aunt     Heart failure Maternal Uncle       reports that she quit smoking about 24 years ago. Her smoking use included cigarettes. She started smoking about 40 years ago. She has a 16 pack-year smoking history. She has never used smokeless tobacco. She reports that she does not currently use alcohol. She reports that she does not currently use drugs after having used the following drugs: Marijuana.  Current Outpatient Medications   Medication Instructions    ALPRAZolam (XANAX) 1 mg, Daily PRN    estradiol-norethindrone (CombiPatch)  0.05-0.25 MG/DAY 1 patch, Transdermal, 2 times weekly    hydrOXYzine HCL (ATARAX) 25 mg, 2 times daily    ibuprofen (MOTRIN) 800 mg tablet 1 TABLET ORALLY TWICE DAILY AS NEEDED FOR PAIN 15 DAYS    QUEtiapine (SEROquel) 50 mg tablet TAKE 1-2 TABLETS BY MOUTH EVERY DAY AT BEDTIME    traZODone (DESYREL) 100 mg, Oral, Daily at bedtime PRN, Per pt has 100 mg tablets and  take 200mg    traZODone (DESYREL)  mg, Daily at bedtime PRN    triamcinolone (KENALOG) 0.025 % cream     triamcinolone (KENALOG) 0.1 % ointment Topical, 2 times daily    Trintellix 5 MG tablet     Trintellix 10 mg, Daily at bedtime    Vyvanse 60 MG capsule     zolpidem (AMBIEN) 10 mg, Daily at bedtime PRN   No Known Allergies   Current Outpatient Medications on File Prior to Visit   Medication Sig Dispense Refill    ALPRAZolam (XANAX) 1 mg tablet Take 1 mg by mouth daily as needed for anxiety PRN  0    estradiol-norethindrone (CombiPatch) 0.05-0.25 MG/DAY Place 1 patch on the skin 2 (two) times a week 8 patch 5    hydrOXYzine HCL (ATARAX) 25 mg tablet Take 25 mg by mouth 2 (two) times a day 2 tablets      ibuprofen (MOTRIN) 800 mg tablet 1 TABLET ORALLY TWICE DAILY AS NEEDED FOR PAIN 15 DAYS      traZODone (DESYREL) 100 mg tablet Take 100 mg by mouth daily at bedtime as needed Per pt has 100 mg tablets and  take 200mg      triamcinolone (KENALOG) 0.025 % cream       Trintellix 10 MG tablet Take 10 mg by mouth daily at bedtime      Vyvanse 60 MG capsule       zolpidem (AMBIEN) 10 mg tablet Take 10 mg by mouth daily at bedtime as needed for sleep      QUEtiapine (SEROquel) 50 mg tablet TAKE 1-2 TABLETS BY MOUTH EVERY DAY AT BEDTIME (Patient not taking: Reported on 4/22/2025)      traZODone (DESYREL) 150 mg tablet Take  mg by mouth daily at bedtime as needed (Patient not taking: Reported on 4/22/2025)      triamcinolone (KENALOG) 0.1 % ointment Apply topically 2 (two) times a day for 14 days 30 g 0    Trintellix 5 MG tablet  (Patient not taking:  Reported on 2025)      [DISCONTINUED] albuterol (Ventolin HFA) 90 mcg/act inhaler Inhale 2 puffs every 6 (six) hours as needed for wheezing 18 g 5    [DISCONTINUED] amphetamine-dextroamphetamine (ADDERALL XR) 10 MG 24 hr capsule       [DISCONTINUED] amphetamine-dextroamphetamine (ADDERALL XR) 30 MG 24 hr capsule       [DISCONTINUED] doxepin (SINEquan) 25 mg capsule Take 1 capsule by mouth daily at bedtime (Patient not taking: Reported on 2024)      [DISCONTINUED] DULoxetine (CYMBALTA) 60 mg delayed release capsule Take 120 mg by mouth in the morning      [DISCONTINUED] predniSONE 10 mg tablet Take 4 tablets for 3 days then 3 tablets for 3 days then 2 tablet for 3 days 1 for 1 day (Patient not taking: Reported on 2024) 28 tablet 0    [DISCONTINUED] promethazine-dextromethorphan (PHENERGAN-DM) 6.25-15 mg/5 mL oral syrup Take 5 mL by mouth 4 (four) times a day as needed for cough (Patient not taking: Reported on 2024) 118 mL 0    [DISCONTINUED] sulfamethoxazole-trimethoprim (BACTRIM DS) 800-160 mg per tablet TAKE 1 TABLET BY MOUTH TWICE A DAY FOR 3 DAYS (Patient not taking: Reported on 2024)      [DISCONTINUED] topiramate (TOPAMAX) 100 mg tablet Take 200 mg by mouth in the morning      [DISCONTINUED] VYVANSE 30 MG capsule Take 60 mg by mouth in the morning  0     No current facility-administered medications on file prior to visit.      Social History     Tobacco Use    Smoking status: Former     Current packs/day: 0.00     Average packs/day: 1 pack/day for 16.0 years (16.0 ttl pk-yrs)     Types: Cigarettes     Start date:      Quit date:      Years since quittin.3    Smokeless tobacco: Never   Vaping Use    Vaping status: Never Used   Substance and Sexual Activity    Alcohol use: Not Currently     Comment: very rarely at a business party, holiday etc    Drug use: Not Currently     Types: Marijuana     Comment: medical marijuana card    Sexual activity: Yes     Partners: Male     Birth  "control/protection: None        Objective   /88   Pulse 95   Ht 5' 8.5\" (1.74 m)   Wt 111 kg (244 lb 9.6 oz)   LMP 04/29/2021 (Exact Date)   SpO2 96%   BMI 36.65 kg/m²      Physical Exam  Constitutional:       Appearance: Normal appearance.   HENT:      Head: Normocephalic and atraumatic.   Cardiovascular:      Rate and Rhythm: Normal rate and regular rhythm.   Pulmonary:      Effort: Pulmonary effort is normal. No respiratory distress.      Breath sounds: Normal breath sounds.   Skin:     General: Skin is warm and dry.   Neurological:      Mental Status: She is alert and oriented to person, place, and time.   Psychiatric:         Mood and Affect: Mood normal.         Behavior: Behavior normal.           "

## 2025-04-22 NOTE — PROGRESS NOTES
Name: Mario Palm      : 1975      MRN: [id number]  Encounter Provider: Amie Mcintosh PA-C  Encounter Date: 2025   Encounter department: [facility] [place]  :  Assessment & Plan  Preoperative evaluation to rule out surgical contraindication    Obesity, Class II, BMI 35-39.9    Colon cancer screening    Patient presents to schedule an EGD prior to bariatric surgery/gastric bypass and screening colonoscopy.  She reports a grandfather with colon cancer.  She reports a history of a HH in  on EGD.    Will plan for EGD to evaluate the anatomy and bx for h pylori and colonoscopy to investigate.      History of Present Illness   HPI  Mario Palm is a 50 y.o. female who presents to the office to schedule an EGD prior to bariatric surgery/gastric bypass and a screening colonoscopy.  She reports a grandfather with colon cancer.  She reports a history of a hiatal hernia on EGD in .  No issues with heartburn or dysphagia.  She denies any GI symptoms.  No blood in the stool.    I discussed informed consent with the patient for the EGD and colonoscopy. The risks/benefits of the procedure were discussed with the patient. Risks included, but not limited to, infection, bleeding, perforation were discussed. Patient was agreeable.   History obtained from: patient    Review of Systems   Constitutional: Negative.    HENT: Negative.     Eyes: Negative.    Respiratory: Negative.     Cardiovascular: Negative.    Gastrointestinal: Negative.    Genitourinary: Negative.    Musculoskeletal: Negative.    Skin: Negative.    Neurological: Negative.    Hematological: Negative.    Psychiatric/Behavioral: Negative.       Medical History Reviewed by provider this encounter:  Meds     .  Past Medical History   Past Medical History:   Diagnosis Date    Age-related physical debility 2025    Anxiety     Arthritis     Chronic post-traumatic stress disorder (PTSD)     Depression      Dermatitis 2024    Endometriosis     Fall 2025    Female infertility     Fibrocystic breast 2012    Fibroid     Hair loss 2023    Headache(784.0)     Hypertension     Memory loss     Migraine     Obesity     Pain in right knee 2019    No injury      Polycystic ovary syndrome     Scoliosis     Sleep apnea      Past Surgical History:   Procedure Laterality Date    BACK SURGERY  2025    Lower Back Ablation    BREAST BIOPSY Left 2012    benign     SECTION      CYST REMOVAL      on back.    MYOMECTOMY       Family History   Problem Relation Age of Onset    Heart failure Mother     Breast cancer Mother         unsure of hx, lump removed    Mental illness Mother     Arthritis Mother     Early death Mother     Substance Abuse Mother     Psychiatric Illness Mother     Heart failure Father     Early death Father     No Known Problems Daughter     No Known Problems Daughter     Breast cancer Maternal Grandmother 60        passed away  from breast cancer    Mental illness Maternal Grandmother     Psychiatric Illness Maternal Grandmother     Cancer Maternal Grandfather     Heart disease Maternal Grandfather     Breast cancer Paternal Grandmother         unsure of hx    Cancer Paternal Grandmother     Glaucoma Paternal Grandfather     Coronary artery disease Family     Heart attack Family     No Known Problems Maternal Aunt     Heart failure Maternal Aunt     Heart failure Maternal Uncle       reports that she quit smoking about 24 years ago. Her smoking use included cigarettes. She started smoking about 40 years ago. She has a 16 pack-year smoking history. She has never used smokeless tobacco. She reports that she does not currently use alcohol. She reports that she does not currently use drugs after having used the following drugs: Marijuana.  Current Outpatient Medications   Medication Instructions    ALPRAZolam (XANAX) 1 mg, Daily PRN    estradiol-norethindrone (CombiPatch)  0.05-0.25 MG/DAY 1 patch, Transdermal, 2 times weekly    hydrOXYzine HCL (ATARAX) 25 mg, 2 times daily    ibuprofen (MOTRIN) 800 mg tablet 1 TABLET ORALLY TWICE DAILY AS NEEDED FOR PAIN 15 DAYS    QUEtiapine (SEROquel) 50 mg tablet TAKE 1-2 TABLETS BY MOUTH EVERY DAY AT BEDTIME    traZODone (DESYREL) 100 mg, Oral, Daily at bedtime PRN, Per pt has 100 mg tablets and  take 200mg    traZODone (DESYREL)  mg, Daily at bedtime PRN    triamcinolone (KENALOG) 0.025 % cream     triamcinolone (KENALOG) 0.1 % ointment Topical, 2 times daily    Trintellix 5 MG tablet     Trintellix 10 mg, Daily at bedtime    Vyvanse 60 MG capsule     zolpidem (AMBIEN) 10 mg, Daily at bedtime PRN   No Known Allergies   Current Outpatient Medications on File Prior to Visit   Medication Sig Dispense Refill    ALPRAZolam (XANAX) 1 mg tablet Take 1 mg by mouth daily as needed for anxiety PRN  0    estradiol-norethindrone (CombiPatch) 0.05-0.25 MG/DAY Place 1 patch on the skin 2 (two) times a week 8 patch 5    hydrOXYzine HCL (ATARAX) 25 mg tablet Take 25 mg by mouth 2 (two) times a day 2 tablets      ibuprofen (MOTRIN) 800 mg tablet 1 TABLET ORALLY TWICE DAILY AS NEEDED FOR PAIN 15 DAYS      traZODone (DESYREL) 100 mg tablet Take 100 mg by mouth daily at bedtime as needed Per pt has 100 mg tablets and  take 200mg      triamcinolone (KENALOG) 0.025 % cream       Trintellix 10 MG tablet Take 10 mg by mouth daily at bedtime      Vyvanse 60 MG capsule       zolpidem (AMBIEN) 10 mg tablet Take 10 mg by mouth daily at bedtime as needed for sleep      QUEtiapine (SEROquel) 50 mg tablet TAKE 1-2 TABLETS BY MOUTH EVERY DAY AT BEDTIME (Patient not taking: Reported on 4/22/2025)      traZODone (DESYREL) 150 mg tablet Take  mg by mouth daily at bedtime as needed (Patient not taking: Reported on 4/22/2025)      triamcinolone (KENALOG) 0.1 % ointment Apply topically 2 (two) times a day for 14 days 30 g 0    Trintellix 5 MG tablet  (Patient not taking:  Reported on 2025)      [DISCONTINUED] albuterol (Ventolin HFA) 90 mcg/act inhaler Inhale 2 puffs every 6 (six) hours as needed for wheezing 18 g 5    [DISCONTINUED] amphetamine-dextroamphetamine (ADDERALL XR) 10 MG 24 hr capsule       [DISCONTINUED] amphetamine-dextroamphetamine (ADDERALL XR) 30 MG 24 hr capsule       [DISCONTINUED] doxepin (SINEquan) 25 mg capsule Take 1 capsule by mouth daily at bedtime (Patient not taking: Reported on 2024)      [DISCONTINUED] DULoxetine (CYMBALTA) 60 mg delayed release capsule Take 120 mg by mouth in the morning      [DISCONTINUED] predniSONE 10 mg tablet Take 4 tablets for 3 days then 3 tablets for 3 days then 2 tablet for 3 days 1 for 1 day (Patient not taking: Reported on 2024) 28 tablet 0    [DISCONTINUED] promethazine-dextromethorphan (PHENERGAN-DM) 6.25-15 mg/5 mL oral syrup Take 5 mL by mouth 4 (four) times a day as needed for cough (Patient not taking: Reported on 2024) 118 mL 0    [DISCONTINUED] sulfamethoxazole-trimethoprim (BACTRIM DS) 800-160 mg per tablet TAKE 1 TABLET BY MOUTH TWICE A DAY FOR 3 DAYS (Patient not taking: Reported on 2024)      [DISCONTINUED] topiramate (TOPAMAX) 100 mg tablet Take 200 mg by mouth in the morning      [DISCONTINUED] VYVANSE 30 MG capsule Take 60 mg by mouth in the morning  0     No current facility-administered medications on file prior to visit.      Social History     Tobacco Use    Smoking status: Former     Current packs/day: 0.00     Average packs/day: 1 pack/day for 16.0 years (16.0 ttl pk-yrs)     Types: Cigarettes     Start date:      Quit date:      Years since quittin.3    Smokeless tobacco: Never   Vaping Use    Vaping status: Never Used   Substance and Sexual Activity    Alcohol use: Not Currently     Comment: very rarely at a business party, holiday etc    Drug use: Not Currently     Types: Marijuana     Comment: medical marijuana card    Sexual activity: Yes     Partners: Male     Birth  "control/protection: None        Objective   /88   Pulse 95   Ht 5' 8.5\" (1.74 m)   Wt 111 kg (244 lb 9.6 oz)   LMP 04/29/2021 (Exact Date)   SpO2 96%   BMI 36.65 kg/m²      Physical Exam  Constitutional:       Appearance: Normal appearance.   HENT:      Head: Normocephalic and atraumatic.   Cardiovascular:      Rate and Rhythm: Normal rate and regular rhythm.   Pulmonary:      Effort: Pulmonary effort is normal. No respiratory distress.      Breath sounds: Normal breath sounds.   Skin:     General: Skin is warm and dry.   Neurological:      Mental Status: She is alert and oriented to person, place, and time.   Psychiatric:         Mood and Affect: Mood normal.         Behavior: Behavior normal.           "

## 2025-04-22 NOTE — PATIENT INSTRUCTIONS
Scheduled date of EGD/colonoscopy (as of today): 4/24/25  Physician performing EGD/colonoscopy: Sherrie  Location of EGD/colonoscopy: [place]  Desired bowel prep reviewed with patient: Miralax  Instructions reviewed with patient by: Annel PARKER  Clearances:

## 2025-04-24 ENCOUNTER — PREP FOR PROCEDURE (OUTPATIENT)
Age: 50
End: 2025-04-24

## 2025-04-24 ENCOUNTER — ANESTHESIA (OUTPATIENT)
Dept: GASTROENTEROLOGY | Facility: HOSPITAL | Age: 50
End: 2025-04-24
Payer: COMMERCIAL

## 2025-04-24 ENCOUNTER — ANESTHESIA EVENT (OUTPATIENT)
Dept: GASTROENTEROLOGY | Facility: HOSPITAL | Age: 50
End: 2025-04-24
Payer: COMMERCIAL

## 2025-04-24 ENCOUNTER — HOSPITAL ENCOUNTER (OUTPATIENT)
Dept: GASTROENTEROLOGY | Facility: HOSPITAL | Age: 50
Setting detail: OUTPATIENT SURGERY
End: 2025-04-24
Attending: PHYSICIAN ASSISTANT
Payer: COMMERCIAL

## 2025-04-24 VITALS
SYSTOLIC BLOOD PRESSURE: 144 MMHG | BODY MASS INDEX: 35.55 KG/M2 | HEART RATE: 67 BPM | DIASTOLIC BLOOD PRESSURE: 78 MMHG | WEIGHT: 234.57 LBS | RESPIRATION RATE: 20 BRPM | OXYGEN SATURATION: 99 % | TEMPERATURE: 97.9 F | HEIGHT: 68 IN

## 2025-04-24 DIAGNOSIS — Z12.11 COLON CANCER SCREENING: ICD-10-CM

## 2025-04-24 DIAGNOSIS — Z01.818 PREOPERATIVE EVALUATION TO RULE OUT SURGICAL CONTRAINDICATION: ICD-10-CM

## 2025-04-24 DIAGNOSIS — E66.812 OBESITY, CLASS II, BMI 35-39.9: ICD-10-CM

## 2025-04-24 PROCEDURE — G0121 COLON CA SCRN NOT HI RSK IND: HCPCS | Performed by: INTERNAL MEDICINE

## 2025-04-24 PROCEDURE — 88342 IMHCHEM/IMCYTCHM 1ST ANTB: CPT | Performed by: STUDENT IN AN ORGANIZED HEALTH CARE EDUCATION/TRAINING PROGRAM

## 2025-04-24 PROCEDURE — 88341 IMHCHEM/IMCYTCHM EA ADD ANTB: CPT | Performed by: STUDENT IN AN ORGANIZED HEALTH CARE EDUCATION/TRAINING PROGRAM

## 2025-04-24 PROCEDURE — 44361 SMALL BOWEL ENDOSCOPY/BIOPSY: CPT | Performed by: INTERNAL MEDICINE

## 2025-04-24 PROCEDURE — 88305 TISSUE EXAM BY PATHOLOGIST: CPT | Performed by: STUDENT IN AN ORGANIZED HEALTH CARE EDUCATION/TRAINING PROGRAM

## 2025-04-24 RX ORDER — LIDOCAINE HYDROCHLORIDE 20 MG/ML
INJECTION, SOLUTION EPIDURAL; INFILTRATION; INTRACAUDAL; PERINEURAL AS NEEDED
Status: DISCONTINUED | OUTPATIENT
Start: 2025-04-24 | End: 2025-04-24

## 2025-04-24 RX ORDER — PROPOFOL 10 MG/ML
INJECTION, EMULSION INTRAVENOUS AS NEEDED
Status: DISCONTINUED | OUTPATIENT
Start: 2025-04-24 | End: 2025-04-24

## 2025-04-24 RX ORDER — SODIUM CHLORIDE, SODIUM LACTATE, POTASSIUM CHLORIDE, CALCIUM CHLORIDE 600; 310; 30; 20 MG/100ML; MG/100ML; MG/100ML; MG/100ML
125 INJECTION, SOLUTION INTRAVENOUS CONTINUOUS
Status: DISCONTINUED | OUTPATIENT
Start: 2025-04-24 | End: 2025-04-28 | Stop reason: HOSPADM

## 2025-04-24 RX ADMIN — PROPOFOL 30 MG: 10 INJECTION, EMULSION INTRAVENOUS at 08:40

## 2025-04-24 RX ADMIN — LIDOCAINE HYDROCHLORIDE 100 MG: 20 INJECTION, SOLUTION EPIDURAL; INFILTRATION; INTRACAUDAL; PERINEURAL at 08:27

## 2025-04-24 RX ADMIN — PROPOFOL 20 MG: 10 INJECTION, EMULSION INTRAVENOUS at 08:43

## 2025-04-24 RX ADMIN — PROPOFOL 50 MG: 10 INJECTION, EMULSION INTRAVENOUS at 08:31

## 2025-04-24 RX ADMIN — PROPOFOL 150 MG: 10 INJECTION, EMULSION INTRAVENOUS at 08:27

## 2025-04-24 RX ADMIN — SODIUM CHLORIDE, SODIUM LACTATE, POTASSIUM CHLORIDE, AND CALCIUM CHLORIDE 125 ML/HR: .6; .31; .03; .02 INJECTION, SOLUTION INTRAVENOUS at 07:45

## 2025-04-24 RX ADMIN — PROPOFOL 30 MG: 10 INJECTION, EMULSION INTRAVENOUS at 08:34

## 2025-04-24 RX ADMIN — PROPOFOL 30 MG: 10 INJECTION, EMULSION INTRAVENOUS at 08:37

## 2025-04-24 NOTE — ANESTHESIA PREPROCEDURE EVALUATION
Procedure:  COLONOSCOPY  EGD    Relevant Problems   NEURO/PSYCH   (+) Chronic pain disorder   (+) Depression   (+) Generalized anxiety disorder   (+) Post traumatic stress disorder (PTSD)   (+) Social phobia      PULMONARY   (+) Obstructive sleep apnea        Physical Exam    Airway    Mallampati score: I  TM Distance: >3 FB  Neck ROM: full     Dental   No notable dental hx     Cardiovascular      Pulmonary      Other Findings  post-pubertal.      Anesthesia Plan  ASA Score- 3     Anesthesia Type- IV sedation with anesthesia with ASA Monitors.         Additional Monitors:     Airway Plan:            Plan Factors-Exercise tolerance (METS): >4 METS.    Chart reviewed. EKG reviewed.   Patient summary reviewed.    Patient is not a current smoker.      There is medical exclusion for perioperative obstructive sleep apnea risk education.        Induction- intravenous.    Postoperative Plan-         Informed Consent- Anesthetic plan and risks discussed with patient.  I personally reviewed this patient with the CRNA. Discussed and agreed on the Anesthesia Plan with the CRNA..      NPO Status:  Vitals Value Taken Time   Date of last liquid 04/23/25 04/24/25 0722   Time of last liquid 2100 04/24/25 0722   Date of last solid 04/22/25 04/24/25 0722   Time of last solid 1800 04/24/25 0722

## 2025-04-24 NOTE — ANESTHESIA POSTPROCEDURE EVALUATION
Post-Op Assessment Note    CV Status:  Stable  Pain Score: 0    Pain management: adequate       Mental Status:  Arousable   Hydration Status:  Euvolemic   PONV Controlled:  Controlled   Airway Patency:  Patent     Post Op Vitals Reviewed: Yes    No anethesia notable event occurred.    Staff: CRNA           Last Filed PACU Vitals:  Vitals Value Taken Time   Temp 97.9 °F (36.6 °C) 04/24/25 0850   Pulse 74 04/24/25 0850   /76 04/24/25 0850   Resp 14 04/24/25 0850   SpO2 98 % 04/24/25 0850

## 2025-04-24 NOTE — INTERVAL H&P NOTE
H&P reviewed. After examining the patient I find no changes in the patients condition since the H&P had been written.    Vitals:    04/24/25 0737   BP: 144/79   Pulse: 64   Resp: 16   Temp: 97.6 °F (36.4 °C)   SpO2: 96%

## 2025-04-30 DIAGNOSIS — K29.40 AUTOIMMUNE GASTRITIS: Primary | ICD-10-CM

## 2025-05-29 ENCOUNTER — HOSPITAL ENCOUNTER (EMERGENCY)
Facility: HOSPITAL | Age: 50
End: 2025-05-29
Attending: EMERGENCY MEDICINE | Admitting: EMERGENCY MEDICINE
Payer: COMMERCIAL

## 2025-05-29 ENCOUNTER — HOSPITAL ENCOUNTER (INPATIENT)
Facility: HOSPITAL | Age: 50
LOS: 8 days | Discharge: HOME | End: 2025-06-06
Attending: HOSPITALIST | Admitting: PSYCHIATRY & NEUROLOGY
Payer: COMMERCIAL

## 2025-05-29 VITALS
SYSTOLIC BLOOD PRESSURE: 127 MMHG | TEMPERATURE: 98.6 F | DIASTOLIC BLOOD PRESSURE: 79 MMHG | RESPIRATION RATE: 23 BRPM | OXYGEN SATURATION: 97 % | HEART RATE: 72 BPM

## 2025-05-29 DIAGNOSIS — F41.1 GENERALIZED ANXIETY DISORDER: ICD-10-CM

## 2025-05-29 DIAGNOSIS — T14.91XA SUICIDE ATTEMPT (HCC): ICD-10-CM

## 2025-05-29 DIAGNOSIS — R41.82 AMS (ALTERED MENTAL STATUS): ICD-10-CM

## 2025-05-29 DIAGNOSIS — F90.2 ATTENTION DEFICIT HYPERACTIVITY DISORDER (ADHD), COMBINED TYPE: ICD-10-CM

## 2025-05-29 DIAGNOSIS — E55.9 VITAMIN D DEFICIENCY: ICD-10-CM

## 2025-05-29 DIAGNOSIS — T50.902A INTENTIONAL DRUG OVERDOSE, INITIAL ENCOUNTER (HCC): Primary | ICD-10-CM

## 2025-05-29 DIAGNOSIS — T50.901A OVERDOSE: ICD-10-CM

## 2025-05-29 DIAGNOSIS — E53.8 VITAMIN B12 DEFICIENCY: ICD-10-CM

## 2025-05-29 DIAGNOSIS — F33.9 MAJOR DEPRESSION, RECURRENT (HCC): Primary | ICD-10-CM

## 2025-05-29 DIAGNOSIS — K04.7 TOOTH INFECTION: ICD-10-CM

## 2025-05-29 DIAGNOSIS — R21 RASH: ICD-10-CM

## 2025-05-29 DIAGNOSIS — E78.5 HYPERLIPIDEMIA: ICD-10-CM

## 2025-05-29 LAB
ALBUMIN SERPL BCG-MCNC: 3.9 G/DL (ref 3.5–5)
ALP SERPL-CCNC: 69 U/L (ref 34–104)
ALT SERPL W P-5'-P-CCNC: 15 U/L (ref 7–52)
AMPHETAMINES SERPL QL SCN: NEGATIVE
ANION GAP SERPL CALCULATED.3IONS-SCNC: 6 MMOL/L (ref 4–13)
APAP SERPL-MCNC: <2 UG/ML (ref 10–20)
AST SERPL W P-5'-P-CCNC: 15 U/L (ref 13–39)
ATRIAL RATE: 68 BPM
BARBITURATES UR QL: NEGATIVE
BASOPHILS # BLD AUTO: 0.03 THOUSANDS/ÂΜL (ref 0–0.1)
BASOPHILS NFR BLD AUTO: 1 % (ref 0–1)
BENZODIAZ UR QL: POSITIVE
BILIRUB SERPL-MCNC: 0.36 MG/DL (ref 0.2–1)
BUN SERPL-MCNC: 13 MG/DL (ref 5–25)
CALCIUM SERPL-MCNC: 9.6 MG/DL (ref 8.4–10.2)
CHLORIDE SERPL-SCNC: 107 MMOL/L (ref 96–108)
CO2 SERPL-SCNC: 30 MMOL/L (ref 21–32)
COCAINE UR QL: NEGATIVE
CREAT SERPL-MCNC: 0.78 MG/DL (ref 0.6–1.3)
EOSINOPHIL # BLD AUTO: 0.14 THOUSAND/ÂΜL (ref 0–0.61)
EOSINOPHIL NFR BLD AUTO: 2 % (ref 0–6)
ERYTHROCYTE [DISTWIDTH] IN BLOOD BY AUTOMATED COUNT: 13.9 % (ref 11.6–15.1)
ETHANOL EXG-MCNC: 0 MG/DL
ETHANOL SERPL-MCNC: <10 MG/DL
EXT PREGNANCY TEST URINE: NEGATIVE
EXT. CONTROL: NORMAL
FENTANYL UR QL SCN: NEGATIVE
GFR SERPL CREATININE-BSD FRML MDRD: 88 ML/MIN/1.73SQ M
GLUCOSE SERPL-MCNC: 95 MG/DL (ref 65–140)
HCT VFR BLD AUTO: 39.8 % (ref 34.8–46.1)
HGB BLD-MCNC: 12.9 G/DL (ref 11.5–15.4)
HYDROCODONE UR QL SCN: NEGATIVE
IMM GRANULOCYTES # BLD AUTO: 0.02 THOUSAND/UL (ref 0–0.2)
IMM GRANULOCYTES NFR BLD AUTO: 0 % (ref 0–2)
LYMPHOCYTES # BLD AUTO: 1.34 THOUSANDS/ÂΜL (ref 0.6–4.47)
LYMPHOCYTES NFR BLD AUTO: 22 % (ref 14–44)
MCH RBC QN AUTO: 28.5 PG (ref 26.8–34.3)
MCHC RBC AUTO-ENTMCNC: 32.4 G/DL (ref 31.4–37.4)
MCV RBC AUTO: 88 FL (ref 82–98)
METHADONE UR QL: NEGATIVE
MONOCYTES # BLD AUTO: 0.52 THOUSAND/ÂΜL (ref 0.17–1.22)
MONOCYTES NFR BLD AUTO: 9 % (ref 4–12)
NEUTROPHILS # BLD AUTO: 3.96 THOUSANDS/ÂΜL (ref 1.85–7.62)
NEUTS SEG NFR BLD AUTO: 66 % (ref 43–75)
NRBC BLD AUTO-RTO: 0 /100 WBCS
OPIATES UR QL SCN: NEGATIVE
OXYCODONE+OXYMORPHONE UR QL SCN: NEGATIVE
P AXIS: 62 DEGREES
PCP UR QL: NEGATIVE
PLATELET # BLD AUTO: 293 THOUSANDS/UL (ref 149–390)
PMV BLD AUTO: 10.5 FL (ref 8.9–12.7)
POTASSIUM SERPL-SCNC: 4.5 MMOL/L (ref 3.5–5.3)
PR INTERVAL: 160 MS
PROT SERPL-MCNC: 6.5 G/DL (ref 6.4–8.4)
QRS AXIS: 40 DEGREES
QRSD INTERVAL: 78 MS
QT INTERVAL: 432 MS
QTC INTERVAL: 459 MS
RBC # BLD AUTO: 4.53 MILLION/UL (ref 3.81–5.12)
SALICYLATES SERPL-MCNC: <5 MG/DL (ref 5–20)
SODIUM SERPL-SCNC: 143 MMOL/L (ref 135–147)
T WAVE AXIS: 40 DEGREES
THC UR QL: NEGATIVE
VENTRICULAR RATE: 68 BPM
WBC # BLD AUTO: 6.01 THOUSAND/UL (ref 4.31–10.16)

## 2025-05-29 PROCEDURE — 99285 EMERGENCY DEPT VISIT HI MDM: CPT | Performed by: EMERGENCY MEDICINE

## 2025-05-29 PROCEDURE — 80053 COMPREHEN METABOLIC PANEL: CPT | Performed by: EMERGENCY MEDICINE

## 2025-05-29 PROCEDURE — 80307 DRUG TEST PRSMV CHEM ANLYZR: CPT | Performed by: EMERGENCY MEDICINE

## 2025-05-29 PROCEDURE — 81025 URINE PREGNANCY TEST: CPT | Performed by: EMERGENCY MEDICINE

## 2025-05-29 PROCEDURE — 80179 DRUG ASSAY SALICYLATE: CPT | Performed by: EMERGENCY MEDICINE

## 2025-05-29 PROCEDURE — 36415 COLL VENOUS BLD VENIPUNCTURE: CPT | Performed by: EMERGENCY MEDICINE

## 2025-05-29 PROCEDURE — 93010 ELECTROCARDIOGRAM REPORT: CPT | Performed by: INTERNAL MEDICINE

## 2025-05-29 PROCEDURE — 82077 ASSAY SPEC XCP UR&BREATH IA: CPT | Performed by: EMERGENCY MEDICINE

## 2025-05-29 PROCEDURE — 93005 ELECTROCARDIOGRAM TRACING: CPT

## 2025-05-29 PROCEDURE — 99285 EMERGENCY DEPT VISIT HI MDM: CPT

## 2025-05-29 PROCEDURE — 80143 DRUG ASSAY ACETAMINOPHEN: CPT | Performed by: EMERGENCY MEDICINE

## 2025-05-29 PROCEDURE — 82075 ASSAY OF BREATH ETHANOL: CPT | Performed by: EMERGENCY MEDICINE

## 2025-05-29 PROCEDURE — 85025 COMPLETE CBC W/AUTO DIFF WBC: CPT | Performed by: EMERGENCY MEDICINE

## 2025-05-29 PROCEDURE — GZ59ZZZ INDIVIDUAL PSYCHOTHERAPY, PSYCHOPHYSIOLOGICAL: ICD-10-PCS | Performed by: PSYCHIATRY & NEUROLOGY

## 2025-05-29 PROCEDURE — GZHZZZZ GROUP PSYCHOTHERAPY: ICD-10-PCS | Performed by: PSYCHIATRY & NEUROLOGY

## 2025-05-29 RX ORDER — LORAZEPAM 2 MG/ML
2 INJECTION INTRAMUSCULAR
Status: CANCELLED | OUTPATIENT
Start: 2025-05-29

## 2025-05-29 RX ORDER — BENZTROPINE MESYLATE 1 MG/ML
0.5 INJECTION, SOLUTION INTRAMUSCULAR; INTRAVENOUS
Status: CANCELLED | OUTPATIENT
Start: 2025-05-29

## 2025-05-29 RX ORDER — HALOPERIDOL 5 MG/1
5 TABLET ORAL
Status: DISCONTINUED | OUTPATIENT
Start: 2025-05-29 | End: 2025-06-06 | Stop reason: HOSPADM

## 2025-05-29 RX ORDER — BENZTROPINE MESYLATE 1 MG/ML
1 INJECTION, SOLUTION INTRAMUSCULAR; INTRAVENOUS
Status: CANCELLED | OUTPATIENT
Start: 2025-05-29

## 2025-05-29 RX ORDER — BISACODYL 10 MG
10 SUPPOSITORY, RECTAL RECTAL DAILY PRN
Status: CANCELLED | OUTPATIENT
Start: 2025-05-29

## 2025-05-29 RX ORDER — HYDROXYZINE HYDROCHLORIDE 25 MG/1
50 TABLET, FILM COATED ORAL
Status: CANCELLED | OUTPATIENT
Start: 2025-05-29

## 2025-05-29 RX ORDER — HYDROXYZINE HYDROCHLORIDE 25 MG/1
25 TABLET, FILM COATED ORAL
Status: DISCONTINUED | OUTPATIENT
Start: 2025-05-29 | End: 2025-05-31

## 2025-05-29 RX ORDER — PROPRANOLOL HYDROCHLORIDE 10 MG/1
10 TABLET ORAL EVERY 8 HOURS PRN
Status: DISCONTINUED | OUTPATIENT
Start: 2025-05-29 | End: 2025-06-06 | Stop reason: HOSPADM

## 2025-05-29 RX ORDER — BENZTROPINE MESYLATE 1 MG/ML
0.5 INJECTION, SOLUTION INTRAMUSCULAR; INTRAVENOUS
Status: DISCONTINUED | OUTPATIENT
Start: 2025-05-29 | End: 2025-06-06 | Stop reason: HOSPADM

## 2025-05-29 RX ORDER — HALOPERIDOL 5 MG/1
2.5 TABLET ORAL
Status: CANCELLED | OUTPATIENT
Start: 2025-05-29

## 2025-05-29 RX ORDER — AMOXICILLIN 250 MG
1 CAPSULE ORAL DAILY PRN
Status: CANCELLED | OUTPATIENT
Start: 2025-05-29

## 2025-05-29 RX ORDER — BENZTROPINE MESYLATE 1 MG/ML
1 INJECTION, SOLUTION INTRAMUSCULAR; INTRAVENOUS
Status: DISCONTINUED | OUTPATIENT
Start: 2025-05-29 | End: 2025-06-06 | Stop reason: HOSPADM

## 2025-05-29 RX ORDER — TRAZODONE HYDROCHLORIDE 50 MG/1
50 TABLET ORAL
Status: CANCELLED | OUTPATIENT
Start: 2025-05-29

## 2025-05-29 RX ORDER — HYDROXYZINE HYDROCHLORIDE 25 MG/1
25 TABLET, FILM COATED ORAL
Status: CANCELLED | OUTPATIENT
Start: 2025-05-29

## 2025-05-29 RX ORDER — IBUPROFEN 600 MG/1
600 TABLET, FILM COATED ORAL EVERY 6 HOURS PRN
Status: CANCELLED | OUTPATIENT
Start: 2025-05-29

## 2025-05-29 RX ORDER — BENZTROPINE MESYLATE 1 MG/1
1 TABLET ORAL
Status: DISCONTINUED | OUTPATIENT
Start: 2025-05-29 | End: 2025-06-06 | Stop reason: HOSPADM

## 2025-05-29 RX ORDER — POLYETHYLENE GLYCOL 3350 17 G/17G
17 POWDER, FOR SOLUTION ORAL DAILY PRN
Status: DISCONTINUED | OUTPATIENT
Start: 2025-05-29 | End: 2025-06-06 | Stop reason: HOSPADM

## 2025-05-29 RX ORDER — IBUPROFEN 400 MG/1
800 TABLET, FILM COATED ORAL EVERY 8 HOURS PRN
Status: CANCELLED | OUTPATIENT
Start: 2025-05-29

## 2025-05-29 RX ORDER — MAGNESIUM HYDROXIDE/ALUMINUM HYDROXICE/SIMETHICONE 120; 1200; 1200 MG/30ML; MG/30ML; MG/30ML
30 SUSPENSION ORAL EVERY 4 HOURS PRN
Status: CANCELLED | OUTPATIENT
Start: 2025-05-29

## 2025-05-29 RX ORDER — AMOXICILLIN 250 MG
1 CAPSULE ORAL DAILY PRN
Status: DISCONTINUED | OUTPATIENT
Start: 2025-05-29 | End: 2025-06-06 | Stop reason: HOSPADM

## 2025-05-29 RX ORDER — HALOPERIDOL 5 MG/ML
5 INJECTION INTRAMUSCULAR
Status: CANCELLED | OUTPATIENT
Start: 2025-05-29

## 2025-05-29 RX ORDER — PROPRANOLOL HCL 20 MG
10 TABLET ORAL EVERY 8 HOURS PRN
Status: CANCELLED | OUTPATIENT
Start: 2025-05-29

## 2025-05-29 RX ORDER — TRAZODONE HYDROCHLORIDE 50 MG/1
50 TABLET ORAL
Status: DISCONTINUED | OUTPATIENT
Start: 2025-05-29 | End: 2025-05-31

## 2025-05-29 RX ORDER — HALOPERIDOL 0.5 MG/1
1 TABLET ORAL EVERY 6 HOURS PRN
Status: DISCONTINUED | OUTPATIENT
Start: 2025-05-29 | End: 2025-06-06 | Stop reason: HOSPADM

## 2025-05-29 RX ORDER — IBUPROFEN 800 MG/1
800 TABLET, FILM COATED ORAL EVERY 8 HOURS PRN
Status: DISCONTINUED | OUTPATIENT
Start: 2025-05-29 | End: 2025-06-06 | Stop reason: HOSPADM

## 2025-05-29 RX ORDER — LORAZEPAM 2 MG/ML
2 INJECTION INTRAMUSCULAR EVERY 6 HOURS PRN
Status: CANCELLED | OUTPATIENT
Start: 2025-05-29

## 2025-05-29 RX ORDER — HYDROXYZINE HYDROCHLORIDE 25 MG/1
100 TABLET, FILM COATED ORAL
Status: CANCELLED | OUTPATIENT
Start: 2025-05-29

## 2025-05-29 RX ORDER — DIPHENHYDRAMINE HYDROCHLORIDE 50 MG/ML
50 INJECTION, SOLUTION INTRAMUSCULAR; INTRAVENOUS EVERY 6 HOURS PRN
Status: CANCELLED | OUTPATIENT
Start: 2025-05-29

## 2025-05-29 RX ORDER — HALOPERIDOL 5 MG/ML
5 INJECTION INTRAMUSCULAR
Status: DISCONTINUED | OUTPATIENT
Start: 2025-05-29 | End: 2025-06-06 | Stop reason: HOSPADM

## 2025-05-29 RX ORDER — LORAZEPAM 2 MG/ML
1 INJECTION INTRAMUSCULAR
Status: CANCELLED | OUTPATIENT
Start: 2025-05-29

## 2025-05-29 RX ORDER — LORAZEPAM 2 MG/ML
1 INJECTION INTRAMUSCULAR
Status: DISCONTINUED | OUTPATIENT
Start: 2025-05-29 | End: 2025-06-06 | Stop reason: HOSPADM

## 2025-05-29 RX ORDER — HALOPERIDOL 1 MG/1
1 TABLET ORAL EVERY 6 HOURS PRN
Status: CANCELLED | OUTPATIENT
Start: 2025-05-29

## 2025-05-29 RX ORDER — BISACODYL 10 MG
10 SUPPOSITORY, RECTAL RECTAL DAILY PRN
Status: DISCONTINUED | OUTPATIENT
Start: 2025-05-29 | End: 2025-06-06 | Stop reason: HOSPADM

## 2025-05-29 RX ORDER — POLYETHYLENE GLYCOL 3350 17 G/17G
17 POWDER, FOR SOLUTION ORAL DAILY PRN
Status: CANCELLED | OUTPATIENT
Start: 2025-05-29

## 2025-05-29 RX ORDER — HALOPERIDOL 5 MG/ML
2.5 INJECTION INTRAMUSCULAR
Status: CANCELLED | OUTPATIENT
Start: 2025-05-29

## 2025-05-29 RX ORDER — LORAZEPAM 2 MG/ML
2 INJECTION INTRAMUSCULAR
Status: DISCONTINUED | OUTPATIENT
Start: 2025-05-29 | End: 2025-06-06 | Stop reason: HOSPADM

## 2025-05-29 RX ORDER — DIPHENHYDRAMINE HYDROCHLORIDE 50 MG/ML
50 INJECTION, SOLUTION INTRAMUSCULAR; INTRAVENOUS EVERY 6 HOURS PRN
Status: DISCONTINUED | OUTPATIENT
Start: 2025-05-29 | End: 2025-05-30

## 2025-05-29 RX ORDER — HALOPERIDOL 5 MG/ML
2.5 INJECTION INTRAMUSCULAR
Status: DISCONTINUED | OUTPATIENT
Start: 2025-05-29 | End: 2025-06-06 | Stop reason: HOSPADM

## 2025-05-29 RX ORDER — IBUPROFEN 400 MG/1
400 TABLET, FILM COATED ORAL EVERY 4 HOURS PRN
Status: CANCELLED | OUTPATIENT
Start: 2025-05-29

## 2025-05-29 RX ORDER — HYDROXYZINE HYDROCHLORIDE 50 MG/1
50 TABLET, FILM COATED ORAL
Status: DISCONTINUED | OUTPATIENT
Start: 2025-05-29 | End: 2025-05-30

## 2025-05-29 RX ORDER — IBUPROFEN 600 MG/1
600 TABLET, FILM COATED ORAL EVERY 6 HOURS PRN
Status: DISCONTINUED | OUTPATIENT
Start: 2025-05-29 | End: 2025-06-06 | Stop reason: HOSPADM

## 2025-05-29 RX ORDER — MAGNESIUM HYDROXIDE/ALUMINUM HYDROXICE/SIMETHICONE 120; 1200; 1200 MG/30ML; MG/30ML; MG/30ML
30 SUSPENSION ORAL EVERY 4 HOURS PRN
Status: DISCONTINUED | OUTPATIENT
Start: 2025-05-29 | End: 2025-06-06 | Stop reason: HOSPADM

## 2025-05-29 RX ORDER — BENZTROPINE MESYLATE 1 MG/1
1 TABLET ORAL
Status: CANCELLED | OUTPATIENT
Start: 2025-05-29

## 2025-05-29 RX ORDER — IBUPROFEN 400 MG/1
400 TABLET, FILM COATED ORAL EVERY 4 HOURS PRN
Status: DISCONTINUED | OUTPATIENT
Start: 2025-05-29 | End: 2025-06-06 | Stop reason: HOSPADM

## 2025-05-29 RX ORDER — HYDROXYZINE HYDROCHLORIDE 50 MG/1
100 TABLET, FILM COATED ORAL
Status: DISCONTINUED | OUTPATIENT
Start: 2025-05-29 | End: 2025-05-30

## 2025-05-29 RX ORDER — HALOPERIDOL 5 MG/1
5 TABLET ORAL
Status: CANCELLED | OUTPATIENT
Start: 2025-05-29

## 2025-05-29 RX ORDER — LORAZEPAM 2 MG/ML
2 INJECTION INTRAMUSCULAR EVERY 6 HOURS PRN
Status: DISCONTINUED | OUTPATIENT
Start: 2025-05-29 | End: 2025-06-06 | Stop reason: HOSPADM

## 2025-05-29 NOTE — LETTER
ID # [id number]    [facility] # [id number]     Phone # [phone]    DISCHARGE SUMMARY    ATT; DEREK MURCIA

## 2025-05-29 NOTE — ED NOTES
As per Dr Mckee, pt will be medically cleared somewhere between 18:00 - 20:00.    TRACY King, CIS ll  05/29/25 17:13

## 2025-05-29 NOTE — TELEMEDICINE
e-Consult ([facility])  - Medical Toxicology   Name: Mario Palm 50 y.o. female I MRN: [id number]  Unit/Bed#: ED 10 I Date of Admission: 5/29/2025   Date of Service: 5/29/2025 I Hospital Day: 0  Inpatient consult to Toxicology  Consult performed by: Irving Alberts DO  Consult ordered by: Raeann Mcfadden MD        Physician Requesting Evaluation: Raeann Cota*   Reason for Evaluation / Principal Problem: Overdose    Assessment & Plan  Overdose  Workup in process with labs and co-ingestions  Based on medications ingested, mainly will see CNS depression. Not as likely to see antimuscarinic symptoms with hydroxyzine. Can see qtc prolongation with trazodone and orthostasis  IVF for hypotension  Monitor for any signs of serotonin toxicity (as below). Treat with benzos PRN  Would check EKG q2h 3 times to evaluate intervals  Benzodiazepines for any agitation or seizures  Would watch for 6-8 hours after ingestion. Goals for medical stability and consideration for disposition include:  Return to neurologic baseline  Ability to: tolerate PO, spontaneously void, and ambulate independently   Vitals are normal and stable  Labs are without significant abnormality  Appears clinically well  If at the end of the observation period, the patient is symptomatic, has clinically significant lab or vital sign abnormalities, or has an abnormal exam - would admit medically for continued observation and care.  1:1 monitoring with self harm precautions  Will need psychiatry evaluation when medically stable and can participate in a meaningful conversation  In the future treatment of our patient's behavioral health needs, I would recommend avoiding the following medications: bupropion, venlafaxine, desvenlafaxine, citalopram, escitalopram, TCAs, and MAOIs.  In overdose these medications can be highly lethal and can result in seizures, dysrhythmias, and/or hemodynamic instability.    AMS (altered mental  status)  Cardiac monitoring, aspiration precautions, supportive care    Please see additional teaching note below:     Medical Toxicology Teaching Note  [facility]  Serotonin Syndrome  Updated 10/20/2011    Introduction:  The most common severe adverse effect of SSRIs such as escitalopram is serotonergic syndrome.  This constellation of signs/sxs was first reported in patients taking MAOIs who were given another drug that enhanced serotonergic activity.    Pathophysiology:  While the mechanism is not fully understood, it involves excessive stimulation of the serotonin 5-HT2A receptors.      Clinical Manifestations:  The classic triad of serotonin syndrome consists of AMS, autonomic instability, and neuromuscular hyperactivity.  Symptoms include agitation, myoclonus, hyperreflexia (greater in lower extremities than upper extremities), diaphoresis, tremor, diarrhea, incoordination, muscle rigidity, and hyperthermia.  Minor manifestations are common when initiating SSRIs.  Life-threatening toxicity results from hyperthermia which is caused by excessive muscle activity.  Prolonged hyperthermia can cause protein denaturing, enzymatic dysfunction, metabolic acidosis, rhabdomyolysis, myoglobinuria, renal failure, hepatic injury, DIC or ARDS.    Diagnosis:  While several diagnostic criteria exist a simplified modality is to evaluate for the triad of autonomic instability, AMS, and neuromuscular hyperactivity with exposure to as SSRI, MAOI, SNRI, or other offending medication.  It should be noted that patients typically must be exposed to multiple serotonergic agents or take a massive overdose in order to develop serotonergic syndrome.    Management:  Treatment focuses on limiting the neuromuscular hyperactivity since this is what will lead to the fatal hyperthermia.      Benzodiazepines are the first line agent for limiting this hyperactivity.      While there is some evidence that cyproheptadine  may decrease symptoms the benefit appears to be limited to those patients with mild to moderate toxicity.  Furthermore, since cyproheptadine is only available in PO formulation, its use is limited in the severely toxic patient (consider administering through an NG tube).      Case reports indicate that atypical antipsycotics may also be beneficial.      When these modalities are ineffective in preventing hyperthermia, neuromuscular blockade is necessary (i.e. Vecuronium).      References:  Tete COTTON. Poisoning & Drug Overdose. 2007.  Marck JOHN. Marck’s Toxicologic Emergencies. 2006.      For further questions, please contact the medical  on call via SecureChat between 8am and 9pm. If between 9pm and 8am, please reach out to the Poison [facility] at [phone].     Hx and PE limited by the dynamics of a phone consultation. I have not personally interviewed or evaluated the patient, but only advised based on the information provided to me. Primary provider is responsible for all clinical decisions.     History of Present Illness   Mario Palm is a 50 y.o. year old female who presents with intentional overdose of medications - vorioxetine, zolpidem, trazodone, hydroxyzine to fall asleep. Has been more depressed recently and has been contemplating suicide. Time of ingestion thought to be between 11-12 today. Daughter called EMS at 1PM when she got home. Pt is sleepy with mumbled speech. Is protecting airway. Workup in process.    Historical Information   Medical History Review: I have reviewed the patient's PMH, PSH, Social History, Family History, Meds, and Allergies   Social History[1]  Family History[2]    Meds/Allergies   Prior to Admission medications    Medication Sig Start Date End Date Taking? Authorizing Provider   ALPRAZolam (XANAX) 1 mg tablet Take 1 mg by mouth daily as needed for anxiety PRN 6/20/18   Historical Provider, MD   estradiol-norethindrone (CombiPatch) 0.05-0.25 MG/DAY Place 1  patch on the skin 2 (two) times a week 4/14/25   Karen Abdul PA-C   hydrOXYzine HCL (ATARAX) 25 mg tablet Take 25 mg by mouth if needed 2 tablets 2/13/24   Historical Provider, MD   ibuprofen (MOTRIN) 800 mg tablet 1 TABLET ORALLY TWICE DAILY AS NEEDED FOR PAIN 15 DAYS 1/25/25   Historical Provider, MD   traZODone (DESYREL) 100 mg tablet Take 100 mg by mouth daily at bedtime as needed Per pt has 100 mg tablets and  take 200mg    Historical Provider, MD   traZODone (DESYREL) 150 mg tablet Take  mg by mouth daily at bedtime as needed  Patient not taking: Reported on 4/22/2025 2/14/24   Historical Provider, MD   triamcinolone (KENALOG) 0.025 % cream  1/17/24   Historical Provider, MD   triamcinolone (KENALOG) 0.1 % ointment Apply topically 2 (two) times a day for 14 days 6/4/24 6/18/24  Julie Lynn Gutzweiler, PA-C   Trintellix 10 MG tablet Take 10 mg by mouth daily at bedtime 4/14/25   Historical Provider, MD   Trintellix 5 MG tablet  2/11/25   Historical Provider, MD   Vyvanse 60 MG capsule  2/20/24   Historical Provider, MD   zolpidem (AMBIEN) 10 mg tablet Take 10 mg by mouth daily at bedtime as needed for sleep 2/14/24   Historical Provider, MD   Current Medications[3]   Allergies[4]    Objective :  Temp:  [98.6 °F (37 °C)] 98.6 °F (37 °C)  HR:  [69-72] 69  BP: (123-134)/(74-79) 123/74  Resp:  [12-20] 20  SpO2:  [93 %] 93 %  O2 Device: None (Room air)    No intake or output data in the 24 hours ending 05/29/25 1427    Physical exam not performed.      Lab Results: I have reviewed the following results:  Results from last 7 days   Lab Units 05/29/25  1404   WBC Thousand/uL 6.01   HEMOGLOBIN g/dL 12.9   HEMATOCRIT % 39.8   PLATELETS Thousands/uL 293   SEGS PCT % 66   LYMPHO PCT % 22   MONO PCT % 9   EOS PCT % 2                              Imaging Results Review: No pertinent imaging studies reviewed.  Other Study Results Review: EKG was personally reviewed and my interpretation is: NSR. HR 68,  intervals normal. Normal axis. No stemi..    Administrative Statements   11-20 minutes, >50% of the total time devoted to medical consultative verbal/EMR discussion between providers. Written report will be generated in the EMR.    Patient or appropriate family member was verbally informed by Dr Hernandez of this consultative service on their behalf to provide more timely access to specialty care in lieu of an in person consultation. Verbal consent was obtained.       [1]   Social History  Tobacco Use    Smoking status: Former     Current packs/day: 0.00     Average packs/day: 1 pack/day for 16.0 years (16.0 ttl pk-yrs)     Types: Cigarettes     Start date:      Quit date:      Years since quittin.4    Smokeless tobacco: Never   Vaping Use    Vaping status: Never Used   Substance and Sexual Activity    Alcohol use: Not Currently     Comment: very rarely at a business party, holiday etc    Drug use: Not Currently     Types: Marijuana     Comment: medical marijuana card    Sexual activity: Yes     Partners: Male     Birth control/protection: None   [2]   Family History  Problem Relation Name Age of Onset    Heart failure Mother Mario A Arpita     Breast cancer Mother Mario A Arpita         unsure of hx, lump removed    Mental illness Mother Mario A Arpita     Arthritis Mother Mario A Arpita     Early death Mother Mario A Arpita     Substance Abuse Mother Mario A Arpita     Psychiatric Illness Mother Mario A Arpita     Heart failure Father Mario A Arpita     Early death Father Mario HORTON Arpita     No Known Problems Daughter      No Known Problems Daughter      Breast cancer Maternal Grandmother Mario Palm 60        passed away  from breast cancer    Mental illness Maternal Grandmother Mario A Arpita     Psychiatric Illness Maternal Grandmother Mario A Arpita     Cancer Maternal Grandfather Mario A Arpita     Heart disease Maternal Grandfather Mario A Arpita     Breast cancer Paternal Grandmother Mario HORTON Eng          unsure of hx    Cancer Paternal Grandmother Mario Palm     Glaucoma Paternal Grandfather Mario Palm     Coronary artery disease Family      Heart attack Family      No Known Problems Maternal Aunt      Heart failure Maternal Aunt Mario Palm     Heart failure Maternal Uncle Mario HORTON Arpita    [3] No current facility-administered medications for this encounter.    Current Outpatient Medications:     ALPRAZolam (XANAX) 1 mg tablet, Take 1 mg by mouth daily as needed for anxiety PRN, Disp: , Rfl: 0    estradiol-norethindrone (CombiPatch) 0.05-0.25 MG/DAY, Place 1 patch on the skin 2 (two) times a week, Disp: 8 patch, Rfl: 5    hydrOXYzine HCL (ATARAX) 25 mg tablet, Take 25 mg by mouth if needed 2 tablets, Disp: , Rfl:     ibuprofen (MOTRIN) 800 mg tablet, 1 TABLET ORALLY TWICE DAILY AS NEEDED FOR PAIN 15 DAYS, Disp: , Rfl:     traZODone (DESYREL) 100 mg tablet, Take 100 mg by mouth daily at bedtime as needed Per pt has 100 mg tablets and  take 200mg, Disp: , Rfl:     traZODone (DESYREL) 150 mg tablet, Take  mg by mouth daily at bedtime as needed (Patient not taking: Reported on 4/22/2025), Disp: , Rfl:     triamcinolone (KENALOG) 0.025 % cream, , Disp: , Rfl:     triamcinolone (KENALOG) 0.1 % ointment, Apply topically 2 (two) times a day for 14 days, Disp: 30 g, Rfl: 0    Trintellix 10 MG tablet, Take 10 mg by mouth daily at bedtime, Disp: , Rfl:     Trintellix 5 MG tablet, , Disp: , Rfl:     Vyvanse 60 MG capsule, , Disp: , Rfl:     zolpidem (AMBIEN) 10 mg tablet, Take 10 mg by mouth daily at bedtime as needed for sleep, Disp: , Rfl:   [4] No Known Allergies     trunk was WNL (within normal limits)/neck was WNL (within normal limits)

## 2025-05-29 NOTE — ED NOTES
Pt is sitting up on the stretcher, eating her dinner tray, without any distress.      Marcy Eckert RN  05/29/25 5220

## 2025-05-29 NOTE — ED PROVIDER NOTES
"Time reflects when diagnosis was documented in both MDM as applicable and the Disposition within this note       Time User Action Codes Description Comment    5/29/2025  2:27 PM Irving Alberts Add [T50.901A] Overdose     5/29/2025  2:28 PM Irving Alberts Add [R41.82] AMS (altered mental status)     5/29/2025  6:33 PM Mckee-TesYasemin pinedaine Add [T50.902A] Intentional drug overdose, initial encounter (HCC)     5/29/2025  6:33 PM Mckee-TesYasemin pinedaine Add [T14.91XA] Suicide attempt (HCC)     5/29/2025  6:33 PM Mckee-TesorieroCucaRaeann Modify [T50.901A] Overdose     5/29/2025  6:33 PM Mckee-TesCuca pinedaherine Modify [T50.902A] Intentional drug overdose, initial encounter ([facility])     5/29/2025  6:33 PM Mckee-Raeann Hernandez Add [Z00.00] Annual physical exam     5/29/2025  6:33 PM Mckee-TesYaesmin pinedaine Remove [Z00.00] Annual physical exam           ED Disposition       ED Disposition   Transfer to Behavioral Health Condition   --    Date/Time   Thu May 29, 2025  8:46 PM    Comment   Mario Palm should be transferred out to [facility] and has been medically cleared.               Assessment & Plan       Medical Decision Making  This is a 50-year-old female who presents here today with intentional suicide attempt.  She says she took \"sleeping meds\" in an attempt to kill herself, several handfuls were put of unknown amount, a couple of hours ago.  She initially thought it was 2:00 but as it is 2:00 now she is not sure exactly when.  She notes her daughter called EMS when she arrived home, and that was several hours after she took the medications.  She says she took Trintellix, Xanax, Ambien, hydroxyzine, trazodone.  She has been progressively depressed lately, and been having worsening thoughts of wanting to kill herself.  She says she last tried as a teenager.  She denies illicit drug use, taking medications prescribed to somebody else, or recent alcohol use.      Review of systems: Otherwise " negative unless stated as above    She is somewhat sleepy, but no acute distress.  She is occasionally slow to respond, has somewhat mumbled speech, but is oriented.  Exam is otherwise unremarkable.  Will check lab work to evaluate for other coingestants and signs of organ damage related to this, EKG to assess for intervals, and reach out to toxicology given polypharmacy for recommendations of duration of monitoring, any additional specific workup or treatment.    Toxicology recommends every 2 hour EKGs for QTc prolongation monitoring.  She will be medically clear 6-8 hours after ingestion, as long as she has stable vital signs and improve mental status.    Lab work is unremarkable.  QTCs have remained stable(441 ms, 466 ms).  She is still sleepy but more alert and easily arousable, and speaking more clearly.  She denies any other complaints currently.  She is medically clear for crisis evaluation, and inpatient psychiatric placement.  She was advised that she will need inpatient psychiatric treatment.  If she is unwilling to sign a 201, 302 will be petitioned.    She did sign a 201, and was excepted at [facility].    Problems Addressed:  AMS (altered mental status): acute illness or injury  Intentional drug overdose, initial encounter (HCC): acute illness or injury  Overdose: acute illness or injury  Suicide attempt (HCC): acute illness or injury    Amount and/or Complexity of Data Reviewed  Labs: ordered. Decision-making details documented in ED Course.  ECG/medicine tests: ordered and independent interpretation performed. Decision-making details documented in ED Course.    Risk  Decision regarding hospitalization.             Medications - No data to display    ED Risk Strat Scores                    No data recorded                            History of Present Illness       Chief Complaint   Patient presents with    Overdose - Intentional     Pt BIBA from home after being found by their daughter in a  lethargic state, per EMS. Eloisa patterson found next to pt and told EMS that they took a mixture of about 20-25 pills containing trintellix, xanax, ambien, hydroxyzine and trazadone. Pt states that they took them around 11-12 today. Pt is lethargic and fully oriented. GCS 14       Past Medical History[1]   Past Surgical History[2]   Family History[3]   Social History[4]   E-Cigarette/Vaping    E-Cigarette Use Never User       E-Cigarette/Vaping Substances    Nicotine No     THC No     CBD Yes     Flavoring No     Other No     Unknown No       I have reviewed and agree with the history as documented.       Overdose - Intentional      Review of Systems        Objective       ED Triage Vitals [05/29/25 1351]   Temperature Pulse Blood Pressure Respirations SpO2 Patient Position - Orthostatic VS   98.6 °F (37 °C) 72 134/79 12 93 % Lying      Temp src Heart Rate Source BP Location FiO2 (%) Pain Score    -- Monitor Right arm -- --      Vitals      Date and Time Temp Pulse SpO2 Resp BP Pain Score FACES Pain Rating User   05/29/25 1900 -- 72 97 % 23 127/79 -- --    05/29/25 1801 -- 102 -- 23 123/76 -- --    05/29/25 1731 -- 94 -- -- 146/76 -- --    05/29/25 1701 -- 70 -- 17 150/71 -- --    05/29/25 1631 -- 60 -- 21 139/76 -- --    05/29/25 1501 -- 69 -- 27 140/90 -- --    05/29/25 1400 -- 69 93 % 20 123/74 -- -- NW   05/29/25 1351 98.6 °F (37 °C) 72 93 % 12 134/79 -- -- NW            Physical Exam  Vitals and nursing note reviewed.   Constitutional:       General: She is not in acute distress.     Appearance: She is well-developed.   HENT:      Head: Normocephalic and atraumatic.     Eyes:      Conjunctiva/sclera: Conjunctivae normal.      Pupils: Pupils are equal, round, and reactive to light.     Neck:      Trachea: No tracheal deviation.     Cardiovascular:      Rate and Rhythm: Normal rate and regular rhythm.      Heart sounds: Normal heart sounds.   Pulmonary:      Effort: Pulmonary effort is normal. No  respiratory distress.      Breath sounds: Normal breath sounds.   Abdominal:      General: There is no distension.      Palpations: Abdomen is soft.      Tenderness: There is no abdominal tenderness.     Musculoskeletal:      Cervical back: Normal range of motion.     Skin:     General: Skin is warm and dry.     Neurological:      Mental Status: She is oriented to person, place, and time and easily aroused.      GCS: GCS eye subscore is 3. GCS verbal subscore is 5. GCS motor subscore is 6.      Comments: Sleeping, but easily arouses to voice.  Mumbled speech but able to be understood.   Psychiatric:         Mood and Affect: Mood and affect normal.         Behavior: Behavior normal.         Thought Content: Thought content includes suicidal ideation. Thought content does not include homicidal ideation. Thought content includes suicidal plan.         Results Reviewed       Procedure Component Value Units Date/Time    Rapid drug screen, urine [[id number]]  (Abnormal) Collected: 05/29/25 1723    Lab Status: Final result Specimen: Urine, Other Updated: 05/29/25 1954     Amph/Meth UR Negative     Barbiturate Ur Negative     Benzodiazepine Urine Positive     Cocaine Urine Negative     Methadone Urine Negative     Opiate Urine Negative     PCP Ur Negative     THC Urine Negative     Oxycodone Urine Negative     Fentanyl Urine Negative     HYDROCODONE URINE Negative    Narrative:      Presumptive report. If requested, specimen will be sent to reference lab for confirmation.  FOR MEDICAL PURPOSES ONLY.   IF CONFIRMATION NEEDED PLEASE CONTACT THE LAB WITHIN 5 DAYS.    Drug Screen Cutoff Levels:  AMPHETAMINE/METHAMPHETAMINES  1000 ng/mL  BARBITURATES     200 ng/mL  BENZODIAZEPINES     200 ng/mL  COCAINE      300 ng/mL  METHADONE      300 ng/mL  OPIATES      300 ng/mL  PHENCYCLIDINE     25 ng/mL  THC       50 ng/mL  OXYCODONE      100 ng/mL  FENTANYL      5 ng/mL  HYDROCODONE     300 ng/mL    POCT pregnancy, urine [[id number]]   "(Normal) Collected: 05/29/25 1721    Lab Status: Final result Specimen: Urine Updated: 05/29/25 1724     EXT Preg Test, Ur Negative     Control Valid    Comprehensive metabolic panel [[id number]] Collected: 05/29/25 1404    Lab Status: Final result Specimen: Blood from Arm, Right Updated: 05/29/25 1429     Sodium 143 mmol/L      Potassium 4.5 mmol/L      Chloride 107 mmol/L      CO2 30 mmol/L      ANION GAP 6 mmol/L      BUN 13 mg/dL      Creatinine 0.78 mg/dL      Glucose 95 mg/dL      Calcium 9.6 mg/dL      AST 15 U/L      ALT 15 U/L      Alkaline Phosphatase 69 U/L      Total Protein 6.5 g/dL      Albumin 3.9 g/dL      Total Bilirubin 0.36 mg/dL      eGFR 88 ml/min/1.73sq m     Narrative:      National Kid[employer] Disease Foundation guidelines for Chronic Kidney Disease (CKD):     Stage 1 with normal or high GFR (GFR > 90 mL/min/1.73 square meters)    Stage 2 Mild CKD (GFR = 60-89 mL/min/1.73 square meters)    Stage 3A Moderate CKD (GFR = 45-59 mL/min/1.73 square meters)    Stage 3B Moderate CKD (GFR = 30-44 mL/min/1.73 square meters)    Stage 4 Severe CKD (GFR = 15-29 mL/min/1.73 square meters)    Stage 5 End Stage CKD (GFR <15 mL/min/1.73 square meters)  Note: GFR calculation is accurate only with a steady state creatinine    Ethanol [[id number]]  (Normal) Collected: 05/29/25 1404    Lab Status: Final result Specimen: Blood from Arm, Right Updated: 05/29/25 1429     Ethanol Lvl <10 mg/dL     Acetaminophen level-\"If concentration is detectable, please discuss with medical  on call.\" [[id number]]  (Abnormal) Collected: 05/29/25 1404    Lab Status: Final result Specimen: Blood from Arm, Right Updated: 05/29/25 1429     Acetaminophen Level <2 ug/mL     Salicylate level [[id number]]  (Abnormal) Collected: 05/29/25 1404    Lab Status: Final result Specimen: Blood from Arm, Right Updated: 05/29/25 1429     Salicylate Lvl <5 mg/dL     POCT alcohol breath test [[id number]]  (Normal) Collected: 05/29/25 1426    Lab " Status: Final result Updated: 05/29/25 1426     EXTBreath Alcohol 0.00    CBC and differential [[id number]] Collected: 05/29/25 1404    Lab Status: Final result Specimen: Blood from Arm, Right Updated: 05/29/25 1413     WBC 6.01 Thousand/uL      RBC 4.53 Million/uL      Hemoglobin 12.9 g/dL      Hematocrit 39.8 %      MCV 88 fL      MCH 28.5 pg      MCHC 32.4 g/dL      RDW 13.9 %      MPV 10.5 fL      Platelets 293 Thousands/uL      nRBC 0 /100 WBCs      Segmented % 66 %      Immature Grans % 0 %      Lymphocytes % 22 %      Monocytes % 9 %      Eosinophils Relative 2 %      Basophils Relative 1 %      Absolute Neutrophils 3.96 Thousands/µL      Absolute Immature Grans 0.02 Thousand/uL      Absolute Lymphocytes 1.34 Thousands/µL      Absolute Monocytes 0.52 Thousand/µL      Eosinophils Absolute 0.14 Thousand/µL      Basophils Absolute 0.03 Thousands/µL             No orders to display       ECG 12 Lead Documentation Only    Date/Time: 5/29/2025 2:12 PM    Performed by: Raeann Mcfadden MD  Authorized by: Raeann Mcfadden MD    Indications / Diagnosis:  Drug OD  ECG reviewed by me, the ED Provider: yes    Patient location:  ED  Previous ECG:     Previous ECG:  Compared to current    Comparison ECG info:  06/04/24    Similarity:  No change  Interpretation:     Interpretation: normal    Rate:     ECG rate:  68    ECG rate assessment: normal    Rhythm:     Rhythm: sinus rhythm    Ectopy:     Ectopy: none    QRS:     QRS axis:  Normal    QRS intervals:  Normal  Conduction:     Conduction: normal    ST segments:     ST segments:  Normal  T waves:     T waves: normal        ED Medication and Procedure Management   Prior to Admission Medications   Prescriptions Last Dose Informant Patient Reported? Taking?   ALPRAZolam (XANAX) 1 mg tablet  Self Yes No   Sig: Take 1 mg by mouth daily as needed for anxiety PRN   Trintellix 10 MG tablet   Yes No   Sig: Take 10 mg by mouth daily at bedtime    Trintellix 5 MG tablet   Yes No   Patient not taking: Reported on 2025   Vyvanse 60 MG capsule   Yes No   estradiol-norethindrone (CombiPatch) 0.05-0.25 MG/DAY   No No   Sig: Place 1 patch on the skin 2 (two) times a week   hydrOXYzine HCL (ATARAX) 25 mg tablet   Yes No   Sig: Take 25 mg by mouth if needed 2 tablets   ibuprofen (MOTRIN) 800 mg tablet   Yes No   Si TABLET ORALLY TWICE DAILY AS NEEDED FOR PAIN 15 DAYS   traZODone (DESYREL) 100 mg tablet  Self Yes No   Sig: Take 100 mg by mouth daily at bedtime as needed Per pt has 100 mg tablets and  take 200mg   traZODone (DESYREL) 150 mg tablet   Yes No   Sig: Take  mg by mouth daily at bedtime as needed   Patient not taking: Reported on 2025   triamcinolone (KENALOG) 0.025 % cream   Yes No   triamcinolone (KENALOG) 0.1 % ointment   No No   Sig: Apply topically 2 (two) times a day for 14 days   zolpidem (AMBIEN) 10 mg tablet   Yes No   Sig: Take 10 mg by mouth daily at bedtime as needed for sleep      Facility-Administered Medications: None     Patient's Medications   Discharge Prescriptions    No medications on file     No discharge procedures on file.  ED SEPSIS DOCUMENTATION   Time reflects when diagnosis was documented in both MDM as applicable and the Disposition within this note       Time User Action Codes Description Comment    2025  2:27 PM Irving Alberts [T50.901A] Overdose     2025  2:28 PM Irving Alberts [R41.82] AMS (altered mental status)     2025  6:33 PM Raeann Mcfadden Add [T50.902A] Intentional drug overdose, initial encounter ([facility])     2025  6:33 PM Raeann Mcfadden Add [T14.91XA] Suicide attempt (HCC)     2025  6:33 PM Raeann Mcfadden Modify [T50.901A] Overdose     2025  6:33 PM Raeann Mcfadden Modify [T50.902A] Intentional drug overdose, initial encounter (HCC)     2025  6:33 PM Raeann Mcfadden Add [Z00.00] Annual  physical exam     2025  6:33 PM Raeann Mcfadden Remove [Z00.00] Annual physical exam                      [1]   Past Medical History:  Diagnosis Date    Age-related physical debility 2025    Anxiety     Arthritis     Chronic post-traumatic stress disorder (PTSD)     Depression     Dermatitis 2024    Endometriosis     Fall 2025    Female infertility     Fibrocystic breast 2012    Fibroid     Hair loss 2023    Headache(784.0)     Hypertension     Memory loss     Migraine     Obesity     Pain in right knee 2019    No injury      Polycystic ovary syndrome     Scoliosis     Sleep apnea    [2]   Past Surgical History:  Procedure Laterality Date    BACK SURGERY  2025    Lower Back Ablation    BREAST BIOPSY Left 2012    benign     SECTION      CYST REMOVAL      on back.    MYOMECTOMY     [3]   Family History  Problem Relation Name Age of Onset    Heart failure Mother Mario A Eng     Breast cancer Mother Mario A Eng         unsure of hx, lump removed    Mental illness Mother Mario A Eng     Arthritis Mother Mario A Eng     Early death Mother Mario A Eng     Substance Abuse Mother Mario A Eng     Psychiatric Illness Mother Mario A Eng     Heart failure Father Mario A Eng     Early death Father Mario A Eng     No Known Problems Daughter      No Known Problems Daughter      Breast cancer Maternal Grandmother Mario HORTON Eng 60        passed away  from breast cancer    Mental illness Maternal Grandmother Mario A Eng     Psychiatric Illness Maternal Grandmother Mario A Eng     Cancer Maternal Grandfather Mario A Eng     Heart disease Maternal Grandfather Mario A Eng     Breast cancer Paternal Grandmother Mario A Eng         unsure of hx    Cancer Paternal Grandmother Mario A Eng     Glaucoma Paternal Grandfather Mario A Eng     Coronary artery disease Family      Heart attack Family      No Known Problems Maternal Aunt      Heart  failure Maternal Aunt Mario HORTON Arpita     Heart failure Maternal Uncle Mario HORTON Arpita    [4]   Social History  Tobacco Use    Smoking status: Former     Current packs/day: 0.00     Average packs/day: 1 pack/day for 16.0 years (16.0 ttl pk-yrs)     Types: Cigarettes     Start date:      Quit date:      Years since quittin.4    Smokeless tobacco: Never   Vaping Use    Vaping status: Never Used   Substance Use Topics    Alcohol use: Not Currently     Comment: very rarely at a business party, holiday etc    Drug use: Not Currently     Types: Marijuana     Comment: medical marijuana card        Raeann Mcfadden MD  25 [phone]

## 2025-05-29 NOTE — LETTER
[facility] EMERGENCY DEPARTMENT  [address] [address]  Dept: [phone]      EMTALA TRANSFER CONSENT    NAME Mario Palm               1975              MRN [id number]    I have been informed of my rights regarding examination, treatment, and transfer   by Dr. Raeann Cota*    Benefits: Continuity of care    Risks: Potential for delay in receiving treatment      Consent for Transfer:  I acknowledge that my medical condition has been evaluated and explained to me by the emergency department physician or other qualified medical person and/or my attending physician, who has recommended that I be transferred to the service of  Accepting Physician: Dr Dixie Gardner at Accepting Facility Name, City & State : [facility]. The above potential benefits of such transfer, the potential risks associated with such transfer, and the probable risks of not being transferred have been explained to me, and I fully understand them.  The doctor has explained that, in my case, the benefits of transfer outweigh the risks.  I agree to be transferred.    I authorize the performance of emergency medical procedures and treatments upon me in both transit and upon arrival at the receiving facility.  Additionally, I authorize the release of any and all medical records to the receiving facility and request they be transported with me, if possible.  I understand that the safest mode of transportation during a medical emergency is an ambulance and that the Hospital advocates the use of this mode of transport. Risks of traveling to the receiving facility by car, including absence of medical control, life sustaining equipment, such as oxygen, and medical personnel has been explained to me and I fully understand them.    (JUSTO CORRECT BOX BELOW)  [ X ]  I consent to the stated transfer and to be transported by ambulance/helicopter.  [  ]  I consent to the stated  transfer, but refuse transportation by ambulance and accept full responsibility for my transportation by car.  I understand the risks of non-ambulance transfers and I exonerate the Hospital and its staff from any deterioration in my condition that results from this refusal.    X___________________________________________    DATE  25  TIME________  Signature of patient or legally responsible individual signing on patient behalf           RELATIONSHIP TO PATIENT___Self______________________                                Provider Certification    NAME Mario Palm                            1975                     MRN [id number]    A medical screening exam was performed on the above named patient.  Based on the examination:    Condition Necessitating Transfer The primary encounter diagnosis was Intentional drug overdose, initial encounter (HCC). Diagnoses of Overdose, AMS (altered mental status), and Suicide attempt (HCC) were also pertinent to this visit.    Patient Condition: The patient has been stabilized such that within reasonable medical probability, no material deterioration of the patient condition or the condition of the unborn child(nati) is likely to result from the transfer    Reason for Transfer: Level of Care needed not available at this facility    Transfer Requirements: Facility [facility]   Space available and qualified personnel available for treatment as acknowledged by [facility] @ [phone]  Agreed to accept transfer and to provide appropriate medical treatment as acknowledged by       Dr Dixie Gardner  Appropriate medical records of the examination and treatment of the patient are provided at the time of transfer   STAFF INITIAL WHEN COMPLETED _A.A.______  Transfer will be performed by qualified personnel from [facility]  and appropriate transfer equipment as required, including the use of necessary and appropriate life support measures.    Provider  Certification: I have examined the patient and explained the following risks and benefits of being transferred/refusing transfer to the patient/family:  General risk, such as traffic hazards, adverse weather conditions, rough terrain or turbulence, possible failure of equipment (including vehicle or aircraft), or consequences of actions of persons outside the control of the transport personnel      Based on these reasonable risks and benefits to the patient and/or the unborn child(nati), and based upon the information available at the time of the patient’s examination, I certify that the medical benefits reasonably to be expected from the provision of appropriate medical treatments at another medical facility outweigh the increasing risks, if any, to the individual’s medical condition, and in the case of labor to the unborn child, from effecting the transfer.    X____________________________________________ DATE 05/29/25        TIME_______      ORIGINAL - SEND TO MEDICAL RECORDS   COPY - SEND WITH PATIENT DURING TRANSFER
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
## 2025-05-30 PROBLEM — F33.9 MAJOR DEPRESSION, RECURRENT (HCC): Status: ACTIVE | Noted: 2025-05-30

## 2025-05-30 LAB
25(OH)D3 SERPL-MCNC: 28.3 NG/ML (ref 30–100)
ALBUMIN SERPL BCG-MCNC: 3.9 G/DL (ref 3.5–5)
ALP SERPL-CCNC: 55 U/L (ref 34–104)
ALT SERPL W P-5'-P-CCNC: 13 U/L (ref 7–52)
ANION GAP SERPL CALCULATED.3IONS-SCNC: 6 MMOL/L (ref 4–13)
AST SERPL W P-5'-P-CCNC: 13 U/L (ref 13–39)
ATRIAL RATE: 103 BPM
ATRIAL RATE: 58 BPM
ATRIAL RATE: 77 BPM
ATRIAL RATE: 94 BPM
BASOPHILS # BLD AUTO: 0.04 THOUSANDS/ÂΜL (ref 0–0.1)
BASOPHILS NFR BLD AUTO: 1 % (ref 0–1)
BILIRUB SERPL-MCNC: 0.31 MG/DL (ref 0.2–1)
BUN SERPL-MCNC: 17 MG/DL (ref 5–25)
CALCIUM SERPL-MCNC: 9.5 MG/DL (ref 8.4–10.2)
CHLORIDE SERPL-SCNC: 106 MMOL/L (ref 96–108)
CHOLEST SERPL-MCNC: 176 MG/DL (ref ?–200)
CO2 SERPL-SCNC: 29 MMOL/L (ref 21–32)
CREAT SERPL-MCNC: 0.79 MG/DL (ref 0.6–1.3)
EOSINOPHIL # BLD AUTO: 0.19 THOUSAND/ÂΜL (ref 0–0.61)
EOSINOPHIL NFR BLD AUTO: 3 % (ref 0–6)
ERYTHROCYTE [DISTWIDTH] IN BLOOD BY AUTOMATED COUNT: 13.8 % (ref 11.6–15.1)
FOLATE SERPL-MCNC: 10.1 NG/ML
GFR SERPL CREATININE-BSD FRML MDRD: 86 ML/MIN/1.73SQ M
GLUCOSE P FAST SERPL-MCNC: 99 MG/DL (ref 65–99)
GLUCOSE SERPL-MCNC: 99 MG/DL (ref 65–140)
HCT VFR BLD AUTO: 36.9 % (ref 34.8–46.1)
HDLC SERPL-MCNC: 42 MG/DL
HGB BLD-MCNC: 11.8 G/DL (ref 11.5–15.4)
IMM GRANULOCYTES # BLD AUTO: 0.01 THOUSAND/UL (ref 0–0.2)
IMM GRANULOCYTES NFR BLD AUTO: 0 % (ref 0–2)
LDLC SERPL CALC-MCNC: 107 MG/DL (ref 0–100)
LYMPHOCYTES # BLD AUTO: 2.07 THOUSANDS/ÂΜL (ref 0.6–4.47)
LYMPHOCYTES NFR BLD AUTO: 32 % (ref 14–44)
MCH RBC QN AUTO: 28.6 PG (ref 26.8–34.3)
MCHC RBC AUTO-ENTMCNC: 32 G/DL (ref 31.4–37.4)
MCV RBC AUTO: 90 FL (ref 82–98)
MONOCYTES # BLD AUTO: 0.63 THOUSAND/ÂΜL (ref 0.17–1.22)
MONOCYTES NFR BLD AUTO: 10 % (ref 4–12)
NEUTROPHILS # BLD AUTO: 3.57 THOUSANDS/ÂΜL (ref 1.85–7.62)
NEUTS SEG NFR BLD AUTO: 54 % (ref 43–75)
NONHDLC SERPL-MCNC: 134 MG/DL
NRBC BLD AUTO-RTO: 0 /100 WBCS
P AXIS: 46 DEGREES
P AXIS: 46 DEGREES
P AXIS: 62 DEGREES
P AXIS: 74 DEGREES
PLATELET # BLD AUTO: 298 THOUSANDS/UL (ref 149–390)
PMV BLD AUTO: 11.3 FL (ref 8.9–12.7)
POTASSIUM SERPL-SCNC: 4 MMOL/L (ref 3.5–5.3)
PR INTERVAL: 142 MS
PR INTERVAL: 146 MS
PR INTERVAL: 154 MS
PR INTERVAL: 162 MS
PROT SERPL-MCNC: 6.5 G/DL (ref 6.4–8.4)
QRS AXIS: 19 DEGREES
QRS AXIS: 3 DEGREES
QRS AXIS: 42 DEGREES
QRS AXIS: 7 DEGREES
QRSD INTERVAL: 76 MS
QRSD INTERVAL: 78 MS
QRSD INTERVAL: 84 MS
QRSD INTERVAL: 84 MS
QT INTERVAL: 356 MS
QT INTERVAL: 384 MS
QT INTERVAL: 426 MS
QT INTERVAL: 450 MS
QTC INTERVAL: 441 MS
QTC INTERVAL: 466 MS
QTC INTERVAL: 480 MS
QTC INTERVAL: 483 MS
RBC # BLD AUTO: 4.12 MILLION/UL (ref 3.81–5.12)
SODIUM SERPL-SCNC: 141 MMOL/L (ref 135–147)
T WAVE AXIS: -23 DEGREES
T WAVE AXIS: 25 DEGREES
T WAVE AXIS: 28 DEGREES
T WAVE AXIS: 29 DEGREES
TREPONEMA PALLIDUM IGG+IGM AB [PRESENCE] IN SERUM OR PLASMA BY IMMUNOASSAY: NORMAL
TRIGL SERPL-MCNC: 137 MG/DL (ref ?–150)
TSH SERPL DL<=0.05 MIU/L-ACNC: 1.83 UIU/ML (ref 0.45–4.5)
VENTRICULAR RATE: 103 BPM
VENTRICULAR RATE: 58 BPM
VENTRICULAR RATE: 77 BPM
VENTRICULAR RATE: 94 BPM
VIT B12 SERPL-MCNC: 336 PG/ML (ref 180–914)
WBC # BLD AUTO: 6.51 THOUSAND/UL (ref 4.31–10.16)

## 2025-05-30 PROCEDURE — 84443 ASSAY THYROID STIM HORMONE: CPT | Performed by: PSYCHIATRY & NEUROLOGY

## 2025-05-30 PROCEDURE — 85025 COMPLETE CBC W/AUTO DIFF WBC: CPT | Performed by: PSYCHIATRY & NEUROLOGY

## 2025-05-30 PROCEDURE — 93010 ELECTROCARDIOGRAM REPORT: CPT | Performed by: INTERNAL MEDICINE

## 2025-05-30 PROCEDURE — 82746 ASSAY OF FOLIC ACID SERUM: CPT | Performed by: PSYCHIATRY & NEUROLOGY

## 2025-05-30 PROCEDURE — 80053 COMPREHEN METABOLIC PANEL: CPT | Performed by: PSYCHIATRY & NEUROLOGY

## 2025-05-30 PROCEDURE — 86780 TREPONEMA PALLIDUM: CPT | Performed by: PSYCHIATRY & NEUROLOGY

## 2025-05-30 PROCEDURE — 93005 ELECTROCARDIOGRAM TRACING: CPT

## 2025-05-30 PROCEDURE — 80061 LIPID PANEL: CPT | Performed by: PSYCHIATRY & NEUROLOGY

## 2025-05-30 PROCEDURE — 99223 1ST HOSP IP/OBS HIGH 75: CPT | Performed by: PSYCHIATRY & NEUROLOGY

## 2025-05-30 PROCEDURE — 82607 VITAMIN B-12: CPT | Performed by: PSYCHIATRY & NEUROLOGY

## 2025-05-30 PROCEDURE — 82306 VITAMIN D 25 HYDROXY: CPT | Performed by: PSYCHIATRY & NEUROLOGY

## 2025-05-30 RX ORDER — DEXTROAMPHETAMINE SACCHARATE, AMPHETAMINE ASPARTATE, DEXTROAMPHETAMINE SULFATE AND AMPHETAMINE SULFATE 2.5; 2.5; 2.5; 2.5 MG/1; MG/1; MG/1; MG/1
10 TABLET ORAL DAILY
Refills: 0 | Status: DISCONTINUED | OUTPATIENT
Start: 2025-05-31 | End: 2025-05-31

## 2025-05-30 RX ORDER — HYDROXYZINE HYDROCHLORIDE 25 MG/1
25 TABLET, FILM COATED ORAL 3 TIMES DAILY
Status: DISCONTINUED | OUTPATIENT
Start: 2025-05-30 | End: 2025-05-31

## 2025-05-30 RX ORDER — VITAMIN A 3000 MCG
10000 CAPSULE ORAL DAILY
Status: DISCONTINUED | OUTPATIENT
Start: 2025-05-30 | End: 2025-06-04

## 2025-05-30 RX ORDER — CHLORAL HYDRATE 500 MG
1000 CAPSULE ORAL 2 TIMES DAILY
Status: DISCONTINUED | OUTPATIENT
Start: 2025-05-30 | End: 2025-06-04

## 2025-05-30 RX ORDER — TRAZODONE HYDROCHLORIDE 50 MG/1
50 TABLET ORAL
Status: DISCONTINUED | OUTPATIENT
Start: 2025-05-30 | End: 2025-05-31

## 2025-05-30 RX ADMIN — VORTIOXETINE 10 MG: 10 TABLET, FILM COATED ORAL at 14:27

## 2025-05-30 RX ADMIN — OMEGA-3 FATTY ACIDS CAP 1000 MG 1000 MG: 1000 CAP at 14:27

## 2025-05-30 RX ADMIN — Medication 10000 UNITS: at 18:16

## 2025-05-30 RX ADMIN — Medication 1 TABLET: at 14:27

## 2025-05-30 RX ADMIN — OMEGA-3 FATTY ACIDS CAP 1000 MG 1000 MG: 1000 CAP at 18:15

## 2025-05-30 RX ADMIN — CYANOCOBALAMIN TAB 500 MCG 1000 MCG: 500 TAB at 14:27

## 2025-05-30 NOTE — ED NOTES
CW emailed pt's 201 document and face sheet to Ry Mcginnis for review for an in-network bed.    TRACY King, CIS ll  05/29/25 20:07   Catheter used: Left 4. Catheter inserted.

## 2025-05-30 NOTE — PLAN OF CARE
Problem: Ineffective Coping  Goal: Participates in unit activities  Description: Interventions:  - Provide therapeutic environment   - Provide required programming   - Redirect inappropriate behaviors   Outcome: Progressing     Problem: Risk for Self Injury/Neglect  Goal: Attend and participate in unit activities, including therapeutic, recreational, and educational groups  Description: Interventions:  - Provide therapeutic and educational activities daily, encourage attendance and participation, and document same in the medical record  - Obtain collateral information, encourage visitation and family involvement in care   Outcome: Progressing     Problem: Depression  Goal: Attend and participate in unit activities, including therapeutic, recreational, and educational groups  Description: Interventions:  - Provide therapeutic and educational activities daily, encourage attendance and participation, and document same in the medical record   Outcome: Progressing   New patient will encourage her to attend groups

## 2025-05-30 NOTE — TREATMENT TEAM
05/29/25 2330 05/30/25 0007   Provider Notification   Reason for Communication Admission Admission   Provider Name Dixie Cadet Tomer   Provider Role Attending physician Attending physician   Method of Communication Other (Comment)  (epic chat) Other (Comment)  (medical board)   Response Waiting for response Waiting for response   Notification Time 0006 0006   Shift Event Other (Comment)  (PTA med list and C-SSRS lifetime/current) Other (Comment)  (PTA med list and medical board)
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
## 2025-05-30 NOTE — CASE MANAGEMENT
TEE placed call to patient's spouse, Michael: [phone] for family notification. Patient's  expressed concerns re: 'conflicting information' related to areas of patient contact/ visiting/ personal belonging access. Family/patient contact via video chat was reviewed; in-person visits requiring Tx Team review, as discussed earlier by nursing and patient's , were also addressed. Patient access to her phone to retrieve contact numbers or needed information was affirmed.  CM addressed overall concerns to which the  reported acceptance. He was in agreement with the treatment plan as reviewed. He also reported that the patient has an appt for an ablation procedure dealing with lower back pain on Saturday, June 7th that may require rescheduling due to continued stay. He was able to cancel the patient's Psychiatric Med Mgmt appt at Dr. Garcia's office for today. He stated that the patient's therapy appt. Had just occurred the day prior to admission.  He did not express safety concerns re: patient's return home and indicated that transportation would be provided by the family. Relevant phone numbers for future contact with patient and staff were provided. The  had no additional questions or concerns. The call ended mutually.    TEE placed call to the office of patient's psychiatric med mgmt: Dr. Garcia: [phone] for admission notification.  Office currently closed will call again.

## 2025-05-30 NOTE — ED NOTES
Patient is accepted at [facility].  Patient is accepted by Dr. Dixie Gardner per Ry NUÑEZ of Intake.     Transportation is arranged with [facility] via [employer].   Transportation is scheduled for 22:30.   Patient may go to the floor at 22:30.      CW informed Ry NUÑEZ of Intake, Dr Mckee and pt's RN, Marcy CHAPPELL of pt's ETA.      Nurse report is to be called to [phone] prior to patient transfer.    TRACY King, CIS ll  05/29/25  20:27

## 2025-05-30 NOTE — PROGRESS NOTES
05/30/25 1400   Team Meeting   Meeting Type Tx Team Meeting   Initial Conference Date 05/30/25   Next Conference Date 06/30/25   Team Members Present   Team Members Present Physician;Nurse;   Physician Team Member Dr. Mars   Nursing Team Member Princess Mejia RN   Care Management Team Member Henrietta Carlisle RN   Patient/Family Present   Patient Present Yes   Patient's Family Present No     Initial Plan  Treatment team met and reviewed pt strengths, limitations, coping skills, treatment plan and goals; pt declined signature due to Diagnostic issues;  tx plan marked as such; copy on chart

## 2025-05-30 NOTE — PLAN OF CARE
Problem: Ineffective Coping  Goal: Cooperates with admission process  Description: Interventions:   - Complete admission process  Outcome: Completed     Problem: Risk for Self Injury/Neglect  Goal: Refrain from harming self  Description: Interventions:  - Monitor patient closely, per order  - Develop a trusting relationship  - Supervise medication ingestion, monitor effects and side effects   Outcome: Progressing

## 2025-05-30 NOTE — PROGRESS NOTES
Met with Mabel completed her admission self assessment. She states she has had suicidal ideations over the past year. She states her  wants to quit but is not talking about divorce. She planned a birthday party for him and spent most of her time in her room. She also states she has ADHD and feels like a failure since she gets easily distracted/ she has things she enjoys. She wants to learn about self esteem, assertiveness, life skills, copy strategies, help my family to understand my struggles, relaxation technique, healthy lifestyles.
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
## 2025-05-30 NOTE — ED NOTES
Insurance Authorization for admission:   Phone call placed to Earnest.  Phone number: [phone].     Spoke to Sisi EVANS     6 days approved.  Level of care: 201.  Review on 6/3.   Authorization # [id number].    UR will be Boogie WATKINS @ [phone].

## 2025-05-30 NOTE — NURSING NOTE
Pt had broken sleep throughout the night. Awoke a few times, appearing confused and repeatedly asking to shower despite unit rules being explained. Non labored breathing while asleep without signs or symptoms of distress. Continuous q15 minute rounding and safety checks ongoing.

## 2025-05-30 NOTE — PROGRESS NOTES
Patient came in with the following items:     Remains with patient:   2x tshirts   1x pair of jeans   1x pair of leggings   2x pairs of socks   1x pair of underwear   1x pair of slippers     Storage #12:   1x pair of sweat pants   1x    1x watch    1x pair of wired headphones   1x bra   1x pair of keys   1x chapstick   1x bag of wipes   1x sock     Security bag #[id number]:  1x wallet   1x passport   1x PA ID paper   1x health ins card   1x gift card  2x $20's  1x $5  3x quarters (75 cents)  3x debit cards   1x credit card     Patient signed for the following items above.

## 2025-05-30 NOTE — NURSING NOTE
"Pt arrived to [facility] from [place] [facility] on a valid 201 commitment. Pt had a suicide attempt via overdose (Trintellix, Xanax, Ambien, Atarax, and Trazodone). She was found by her daughter who called EMS. Event leading up to SA was having a surprise birthday party for her  this past weekend; he was upset about it and told her he is no longer interested in her. Reports worsening SI over the past 6 months. Pt had a SA via OD when she was 18 by taking her friend's grandparents medications.    Upon arrival to the unit, pt used wheelchair due to generalized weakness. Pt's speech is slow and slurred. Alert and oriented x4. Pt reports feeling drowsy. Continues to endorse passive SI; able to maintain safety on unit. Denies HI but stated \"I understand mothers not wanting to be alive and wanting to take their children with them so they don't have to suffer without a parent\". Pt has a son with autism and 2 adopted children. Currently denies hallucinations but has experienced them before when awake for 2-3 days. Reports ongoing severe anxiety. When asked about depression, pt stated \"I think I struggle more with ADHD and constantly feeling like a failure because I can't complete anything\". Denies current issues with sleep or appetite. Identifies her children, pets, and house as protective factors; stated her  is not supportive.    PTA medication list reviewed. C-SSRS lifetime is high, current is low. Providers made aware. Pt refused shower due to feeling drowsy and leg weakness. Skin assessment completed and unremarkable. Pt currently resting in bed. Reports wearing a CPAP at home; going to contact her  tomorrow to bring. Continuous q15 minute rounding and safety checks ongoing.  "

## 2025-05-30 NOTE — NURSING NOTE
Patient declined Atarax and stated she will not take it while inpatient. Patient stated she will not take Adderall either. She stated she has worked with her doctor for many years on her current medication regimen and does not want to change it.

## 2025-05-30 NOTE — PLAN OF CARE
Problem: DISCHARGE PLANNING  Goal: Discharge to home or other facility with appropriate resources  Outcome: Progressing  Patient is new to unit; 201; Initial Goal added

## 2025-05-30 NOTE — ED NOTES
Called [phone], gave [place] report at [facility].     Marcy Eckert RN  05/29/25 [id number]

## 2025-05-30 NOTE — NURSING NOTE
Patient visible and social with peers. Cooperative and medication compliant. Presents flat but brightens on approach. Denies SI. Did not endorse HI to this writer. Denies AVH. Moderate depression and anxiety. Medications reviewed with patient. Patient asked this writer to message doctor to prescribe 60mg Vyvanse, 10mg Ambien, and 100mg Trazadone. Vyvanse is non formulary, provider substituted with Adderall 10mg. Provider confirmed 50mg Trazadone due to overdose. Patient requested in-person visit with . This writer stated that the treatment team would discuss in rounds on Monday. Continuous visual safety checks performed throughout the shift. All safety precautions maintained.

## 2025-05-31 PROCEDURE — 99232 SBSQ HOSP IP/OBS MODERATE 35: CPT

## 2025-05-31 RX ORDER — HYDROXYZINE HYDROCHLORIDE 50 MG/1
50 TABLET, FILM COATED ORAL 3 TIMES DAILY PRN
Status: DISCONTINUED | OUTPATIENT
Start: 2025-05-31 | End: 2025-06-06 | Stop reason: HOSPADM

## 2025-05-31 RX ORDER — TRAZODONE HYDROCHLORIDE 100 MG/1
100 TABLET ORAL
Status: DISCONTINUED | OUTPATIENT
Start: 2025-05-31 | End: 2025-06-03

## 2025-05-31 RX ORDER — LISDEXAMFETAMINE DIMESYLATE 60 MG/1
60 CAPSULE ORAL DAILY
Status: DISCONTINUED | OUTPATIENT
Start: 2025-06-01 | End: 2025-06-06 | Stop reason: HOSPADM

## 2025-05-31 RX ADMIN — Medication 10000 UNITS: at 11:51

## 2025-05-31 RX ADMIN — HYDROXYZINE HYDROCHLORIDE 50 MG: 50 TABLET ORAL at 15:46

## 2025-05-31 RX ADMIN — Medication 1 TABLET: at 11:51

## 2025-05-31 RX ADMIN — Medication 1000 UNITS: at 15:46

## 2025-05-31 RX ADMIN — HYDROXYZINE HYDROCHLORIDE 50 MG: 50 TABLET ORAL at 22:42

## 2025-05-31 RX ADMIN — TRAZODONE HYDROCHLORIDE 100 MG: 100 TABLET ORAL at 21:36

## 2025-05-31 RX ADMIN — Medication 400 UNITS: at 11:52

## 2025-05-31 RX ADMIN — Medication 3 MG: at 21:35

## 2025-05-31 RX ADMIN — OMEGA-3 FATTY ACIDS CAP 1000 MG 1000 MG: 1000 CAP at 11:51

## 2025-05-31 RX ADMIN — VORTIOXETINE 10 MG: 10 TABLET, FILM COATED ORAL at 11:51

## 2025-05-31 RX ADMIN — CYANOCOBALAMIN TAB 500 MCG 1000 MCG: 500 TAB at 11:51

## 2025-05-31 NOTE — ASSESSMENT & PLAN NOTE
Restart Vyvanse 60 mg daily for ADHD symptoms.  PDMP reviewed.  Patient's  dropped off home supply and labeled bottle, sent to pharmacy.  No associated orders from this encounter found during lookback period of 72 hours.

## 2025-05-31 NOTE — PROGRESS NOTES
"Progress Note - Mario Palm 50 y.o. female MRN: [id number]    Unit/Bed#: [facility] 218-01 Encounter: [id number]        Subjective:   Patient seen and examined at bedside after reviewing the chart and discussing the case with the caring staff.      Patient examined at bedside.  Patient has no acute issues.    On review of patient's labs patient's vitamin D level was found to be low 28.3 and B12 is also low 336.    Physical Exam   Vitals: Blood pressure 119/74, pulse 68, temperature (!) 97.3 °F (36.3 °C), temperature source Temporal, resp. rate 18, height 5' 8\" (1.727 m), weight 109 kg (240 lb 12.8 oz), last menstrual period 04/29/2021, SpO2 96%.,Body mass index is 36.61 kg/m².  Constitutional: He appears well-developed.   HEENT: PERR, EOMI, MMM  Cardiovascular: Normal rate and regular rhythm.    Pulmonary/Chest: Effort normal and breath sounds normal.   Abdomen: Soft, + BS, NT    Assessment/Plan:  Mario Palm is a(n) 50 y.o. female with MDD.      JOSEPH.  CPAP at bedtime.   Hyperlipidemia.  Continue fish oil 1,000 mg twice daily.   Chronic pain disorder.  Ibuprofen as needed.   Vitamin deficiencies.  Continue daily home supplements: MVI, vitamin A, Vitamin E, Vitamin B12.   HRT.  Patient on estradiol-norethindrone (CombiPatch) 0.05-0.25 mg/day 2x weekly.  May have someone bring in from home.  Vitamin D deficiency.  I will put the patient on vitamin D supplements.  Vitamin B12 deficiency.  I will put the patient on vitamin B12 supplements.  "

## 2025-05-31 NOTE — NURSING NOTE
Patient compliant with meds and meals. Patient pleasant and cooperative, social with select peers. Patient denies all except for moderate anxiety and depression. Patient visible on unit. Q 15 min behavioral and safety checks in place.

## 2025-05-31 NOTE — NURSING NOTE
"Pt was visible on the unit, withdrawn to self. Pt refused HS medication, stated that \"it doesn't work\". Cooperative with staff during care. Endorsed mild anxiety. Denied SI, HI, AVH, or depression. Affect was flat. Eye contact as fair. Speech pattern was normal and relaxed. Appearance and hygiene was unremarkable. Made no c/o pain or discomfort. Cpap was applied. Safety checks continued.   "

## 2025-05-31 NOTE — PROGRESS NOTES
Progress Note - Behavioral Health   Mario HORTON Eng 50 y.o. female MRN: [id number]  Unit/Bed#: [facility] 218-01 Encounter: [id number]    Assessment & Plan   Principal Problem:    Major depression, recurrent (HCC)  Active Problems:    Attention deficit hyperactivity disorder (ADHD), combined type    Generalized anxiety disorder      Recommended Treatment:     Assessment & Plan  Major depression, recurrent (HCC)  Trintellix 10 mg daily for depressive symptoms.  Plan to increase to 15 mg daily tomorrow.  No associated orders from this encounter found during lookback period of 72 hours.    Attention deficit hyperactivity disorder (ADHD), combined type  Restart Vyvanse 60 mg daily for ADHD symptoms.  PDMP reviewed.  Patient's  dropped off home supply and labeled bottle, sent to pharmacy.  No associated orders from this encounter found during lookback period of 72 hours.    Generalized anxiety disorder  Trintellix 10 mg daily for anxiety symptoms.  Atarax 50 mg 3 times daily as needed for severe anxiety and panic attacks.  No associated orders from this encounter found during lookback period of 72 hours.    Continue with group therapy, milieu therapy and occupational therapy.    Continue frequent safety checks and vitals per unit protocol.  Continue with SLIM medical management as indicated  Continue coordinating with case management regarding disposition    Legal Status: 201  Disposition: To be determined, coordinating with case management    Case discussed with treatment team.  Risks, benefits and possible side effects of Medications: Risks, benefits, and possible side effects of medications have been explained to the patient, who verbalizes understanding    ------------------------------------------------------------    Subjective: Patient's chart was reviewed, and patient's progress and plan was discussed with treatment team. Per nursing report, Mario has been visible but withdrawn, refusing at bedtime medication and  "endorsing mild anxiety but ultimately redirectable and cooperative on the unit and largelycompliant with medications. Patient has remained in behavioral control for the last 24 hours. Last night patient was documented to have slept throughout the night.    Mario was evaluated this morning for continuity of care. On examination, Mario is pleasant, calm, cooperative and easily redirectable, endorsing home medications to include Vyvanse 60 mg daily which is consistent with PDMP review.  She denies adverse effects from medications but states Atarax is too low a dose to be effective, same for trazodone. She denies suicidal ideation, homicidal ideation, auditory hallucinations, and visual hallucinations. Her goals for today are to remain in behavioral control and medication compliant.    VS: Reviewed, BMI of 36.61, otherwise within normal limits    Progress Toward Goals: Slow improvement    Psychiatric Review of Systems:  Behavior over the last 24 hours: improved  Sleep: insomnia  Appetite: adequate  Medication side effects: none verbalized  Medical ROS: Complete review of systems is negative except as noted above.    Vital signs in last 24 hours:  Temp:  [97.3 °F (36.3 °C)] 97.3 °F (36.3 °C)  HR:  [68] 68  BP: (119)/(74) 119/74  Resp:  [18] 18  SpO2:  [96 %] 96 %  O2 Device: None (Room air)    Mental Status Exam:    Appearance:  alert, good eye contact, appears stated age, casually dressed, marginal grooming/hygiene, and overweight   Behavior:  calm, cooperative, and sitting comfortably   Speech:  spontaneous, clear, normal rate, normal volume, and coherent   Mood:  \"Okay\"   Affect:  mood-congruent, slightly dysphoric   Thought Process:  Linear goal-directed   Associations: intact associations   Thought Content:  no verbalized delusions or overt paranoia   Perceptual Disturbances: no reported hallucinations and does not appear to be responding to internal stimuli at this time   Risk Potential: Suicidal ideation - " None at present  Homicidal ideation - None at present  Potential for aggression - Not at present   Sensorium:  oriented to person, place, time/date, and situation   Memory:  recent and remote memory grossly intact   Consciousness:  alert and awake   Attention/Concentration: attention span and concentration appear shorter than expected for age   Insight:  improving   Judgment: improving   Gait/Station: normal gait/station   Motor Activity: no abnormal movements     Current Medications:  Current Facility-Administered Medications   Medication Dose Route Frequency Provider Last Rate    aluminum-magnesium hydroxide-simethicone  30 mL Oral Q4H PRN Dixie Gardner MD      amphetamine-dextroamphetamine  10 mg Oral Daily Alejandro Mars MD      haloperidol lactate  2.5 mg Intramuscular Q4H PRN Max 4/day Dixie Gardner MD      And    LORazepam  1 mg Intramuscular Q4H PRN Max 4/day Dixie Gardner MD      And    benztropine  0.5 mg Intramuscular Q4H PRN Max 4/day Dixie Gardner MD      haloperidol lactate  5 mg Intramuscular Q4H PRN Max 4/day Dixie Gardner MD      And    LORazepam  2 mg Intramuscular Q4H PRN Max 4/day Dixie Gardner MD      And    benztropine  1 mg Intramuscular Q4H PRN Max 4/day Dixie Gardner MD      benztropine  1 mg Intramuscular Q4H PRN Max 6/day Dixie Gardner MD      benztropine  1 mg Oral Q4H PRN Max 6/day Dixie Gardner MD      bisacodyl  10 mg Rectal Daily PRN Dixie Gardner MD      cyanocobalamin  1,000 mcg Oral Daily Erum Hartman PA-C      fish oil  1,000 mg Oral BID Erum Hartman PA-C      haloperidol  1 mg Oral Q6H PRN Dixie Gardner MD      haloperidol  2.5 mg Oral Q4H PRN Max 4/day Dixie Gardner MD      haloperidol  5 mg Oral Q4H PRN Max 4/day Dixie Gardner MD      hydrOXYzine HCL  25 mg Oral Q6H PRN Max 4/day Dixie Gardner MD      hydrOXYzine HCL  25 mg Oral TID Alejandro Mars MD      ibuprofen  400 mg Oral Q4H PRN Dixie NUÑEZ  MD Jj      ibuprofen  600 mg Oral Q6H PRMINA Gardner MD      ibuprofen  800 mg Oral Q8H PRMINA Gardner MD      LORazepam  2 mg Intramuscular Q6H PRN Dixie Gardner MD      melatonin  3 mg Oral HS Dixie Gardner MD      multivitamin-minerals  1 tablet Oral Daily TUNDE Price-C      polyethylene glycol  17 g Oral Daily PRMINA Gadrner MD      propranolol  10 mg Oral Q8H PRMINA Gardner MD      senna-docusate sodium  1 tablet Oral Daily PRMINA Gardner MD      traZODone  50 mg Oral HS PRMINA Gardner MD      traZODone  50 mg Oral HS Alejandro Mars MD      vitamin A  10,000 Units Oral Daily Erum Hartman PA-C      vitamin E  400 Units Oral Daily TUNDE Price-C      vortioxetine  10 mg Oral Daily Alejandro Mars MD         Behavioral Health Medications: all current active meds have been reviewed. Changes as in plan section above.    Laboratory results:  I have personally reviewed all pertinent laboratory/tests results.   Recent Results (from the past 48 hours)   ECG 12 lead    Collection Time: 05/29/25  1:49 PM   Result Value Ref Range    Ventricular Rate 68 BPM    Atrial Rate 68 BPM    AR Interval 160 ms    QRSD Interval 78 ms    QT Interval 432 ms    QTC Interval 459 ms    P Axis 62 degrees    QRS Axis 40 degrees    T Wave Axis 40 degrees   CBC and differential    Collection Time: 05/29/25  2:04 PM   Result Value Ref Range    WBC 6.01 4.31 - 10.16 Thousand/uL    RBC 4.53 3.81 - 5.12 Million/uL    Hemoglobin 12.9 11.5 - 15.4 g/dL    Hematocrit 39.8 34.8 - 46.1 %    MCV 88 82 - 98 fL    MCH 28.5 26.8 - 34.3 pg    MCHC 32.4 31.4 - 37.4 g/dL    RDW 13.9 11.6 - 15.1 %    MPV 10.5 8.9 - 12.7 fL    Platelets 293 149 - 390 Thousands/uL    nRBC 0 /100 WBCs    Segmented % 66 43 - 75 %    Immature Grans % 0 0 - 2 %    Lymphocytes % 22 14 - 44 %    Monocytes % 9 4 - 12 %    Eosinophils Relative 2 0 - 6 %    Basophils Relative 1 0 - 1 %     "Absolute Neutrophils 3.96 1.85 - 7.62 Thousands/µL    Absolute Immature Grans 0.02 0.00 - 0.20 Thousand/uL    Absolute Lymphocytes 1.34 0.60 - 4.47 Thousands/µL    Absolute Monocytes 0.52 0.17 - 1.22 Thousand/µL    Eosinophils Absolute 0.14 0.00 - 0.61 Thousand/µL    Basophils Absolute 0.03 0.00 - 0.10 Thousands/µL   Comprehensive metabolic panel    Collection Time: 05/29/25  2:04 PM   Result Value Ref Range    Sodium 143 135 - 147 mmol/L    Potassium 4.5 3.5 - 5.3 mmol/L    Chloride 107 96 - 108 mmol/L    CO2 30 21 - 32 mmol/L    ANION GAP 6 4 - 13 mmol/L    BUN 13 5 - 25 mg/dL    Creatinine 0.78 0.60 - 1.30 mg/dL    Glucose 95 65 - 140 mg/dL    Calcium 9.6 8.4 - 10.2 mg/dL    AST 15 13 - 39 U/L    ALT 15 7 - 52 U/L    Alkaline Phosphatase 69 34 - 104 U/L    Total Protein 6.5 6.4 - 8.4 g/dL    Albumin 3.9 3.5 - 5.0 g/dL    Total Bilirubin 0.36 0.20 - 1.00 mg/dL    eGFR 88 ml/min/1.73sq m   Ethanol    Collection Time: 05/29/25  2:04 PM   Result Value Ref Range    Ethanol Lvl <10 <10 mg/dL   Acetaminophen level-\"If concentration is detectable, please discuss with medical  on call.\"    Collection Time: 05/29/25  2:04 PM   Result Value Ref Range    Acetaminophen Level <2 (L) 10 - 20 ug/mL   Salicylate level    Collection Time: 05/29/25  2:04 PM   Result Value Ref Range    Salicylate Lvl <5 (L) 5 - 20 mg/dL   POCT alcohol breath test    Collection Time: 05/29/25  2:26 PM   Result Value Ref Range    EXTBreath Alcohol 0.00    ECG 12 lead    Collection Time: 05/29/25  3:58 PM   Result Value Ref Range    Ventricular Rate 58 BPM    Atrial Rate 58 BPM    OH Interval 162 ms    QRSD Interval 84 ms    QT Interval 450 ms    QTC Interval 441 ms    P Axis 74 degrees    QRS Axis 42 degrees    T Wave Axis 29 degrees   POCT pregnancy, urine    Collection Time: 05/29/25  5:21 PM   Result Value Ref Range    EXT Preg Test, Ur Negative Negative    Control Valid Valid   Rapid drug screen, urine    Collection Time: 05/29/25  " 5:23 PM   Result Value Ref Range    Amph/Meth UR Negative Negative    Barbiturate Ur Negative Negative    Benzodiazepine Urine Positive (A) Negative    Cocaine Urine Negative Negative    Methadone Urine Negative Negative    Opiate Urine Negative Negative    PCP Ur Negative Negative    THC Urine Negative Negative    Oxycodone Urine Negative Negative    Fentanyl Urine Negative Negative    HYDROCODONE URINE Negative Negative   ECG 12 lead    Collection Time: 05/29/25  5:52 PM   Result Value Ref Range    Ventricular Rate 103 BPM    Atrial Rate 103 BPM    ME Interval 142 ms    QRSD Interval 78 ms    QT Interval 356 ms    QTC Interval 466 ms    P Axis 46 degrees    QRS Axis 3 degrees    T Wave Axis 25 degrees   ECG 12 lead    Collection Time: 05/29/25  6:00 PM   Result Value Ref Range    Ventricular Rate 94 BPM    Atrial Rate 94 BPM    ME Interval 146 ms    QRSD Interval 76 ms    QT Interval 384 ms    QTC Interval 480 ms    P Axis 62 degrees    QRS Axis 19 degrees    T Wave Axis 28 degrees   Comprehensive metabolic panel    Collection Time: 05/30/25  5:03 AM   Result Value Ref Range    Sodium 141 135 - 147 mmol/L    Potassium 4.0 3.5 - 5.3 mmol/L    Chloride 106 96 - 108 mmol/L    CO2 29 21 - 32 mmol/L    ANION GAP 6 4 - 13 mmol/L    BUN 17 5 - 25 mg/dL    Creatinine 0.79 0.60 - 1.30 mg/dL    Glucose 99 65 - 140 mg/dL    Glucose, Fasting 99 65 - 99 mg/dL    Calcium 9.5 8.4 - 10.2 mg/dL    AST 13 13 - 39 U/L    ALT 13 7 - 52 U/L    Alkaline Phosphatase 55 34 - 104 U/L    Total Protein 6.5 6.4 - 8.4 g/dL    Albumin 3.9 3.5 - 5.0 g/dL    Total Bilirubin 0.31 0.20 - 1.00 mg/dL    eGFR 86 ml/min/1.73sq m   CBC and differential    Collection Time: 05/30/25  5:03 AM   Result Value Ref Range    WBC 6.51 4.31 - 10.16 Thousand/uL    RBC 4.12 3.81 - 5.12 Million/uL    Hemoglobin 11.8 11.5 - 15.4 g/dL    Hematocrit 36.9 34.8 - 46.1 %    MCV 90 82 - 98 fL    MCH 28.6 26.8 - 34.3 pg    MCHC 32.0 31.4 - 37.4 g/dL    RDW 13.8 11.6 - 15.1  %    MPV 11.3 8.9 - 12.7 fL    Platelets 298 149 - 390 Thousands/uL    nRBC 0 /100 WBCs    Segmented % 54 43 - 75 %    Immature Grans % 0 0 - 2 %    Lymphocytes % 32 14 - 44 %    Monocytes % 10 4 - 12 %    Eosinophils Relative 3 0 - 6 %    Basophils Relative 1 0 - 1 %    Absolute Neutrophils 3.57 1.85 - 7.62 Thousands/µL    Absolute Immature Grans 0.01 0.00 - 0.20 Thousand/uL    Absolute Lymphocytes 2.07 0.60 - 4.47 Thousands/µL    Absolute Monocytes 0.63 0.17 - 1.22 Thousand/µL    Eosinophils Absolute 0.19 0.00 - 0.61 Thousand/µL    Basophils Absolute 0.04 0.00 - 0.10 Thousands/µL   TSH, 3rd generation with Free T4 reflex    Collection Time: 05/30/25  5:03 AM   Result Value Ref Range    TSH 3RD GENERATON 1.835 0.450 - 4.500 uIU/mL   Vitamin B12    Collection Time: 05/30/25  5:03 AM   Result Value Ref Range    Vitamin B-12 336 180 - 914 pg/mL   Folate    Collection Time: 05/30/25  5:03 AM   Result Value Ref Range    Folate 10.1 >5.9 ng/mL   Vitamin D 25 hydroxy    Collection Time: 05/30/25  5:03 AM   Result Value Ref Range    Vit D, 25-Hydroxy 28.3 (L) 30.0 - 100.0 ng/mL   Lipid panel    Collection Time: 05/30/25  5:03 AM   Result Value Ref Range    Cholesterol 176 See Comment mg/dL    Triglycerides 137 See Comment mg/dL    HDL, Direct 42 (L) >=50 mg/dL    LDL Calculated 107 (H) 0 - 100 mg/dL    Non-HDL-Chol (CHOL-HDL) 134 mg/dl   Total Syphilis (IgG/IgM) Screening    Collection Time: 05/30/25  5:03 AM   Result Value Ref Range    Treponema Pallidium Total Antibodies Non-reactive Non-reactive   ECG 12 lead    Collection Time: 05/30/25  7:10 AM   Result Value Ref Range    Ventricular Rate 77 BPM    Atrial Rate 77 BPM    WA Interval 154 ms    QRSD Interval 84 ms    QT Interval 426 ms    QTC Interval 483 ms    P [place] 46 degrees    QRS Axis 7 degrees    T Wave Axis -23 degrees        This note has been constructed using a voice recognition system. There may be translation, syntax, or grammatical errors. If you have any  questions, please contact the dictating author.    Jermaine Barclay, DO  Psychiatry Residency, PGY-III

## 2025-05-31 NOTE — PLAN OF CARE
Problem: SAFETY ADULT  Goal: Patient will remain free of falls  Description: INTERVENTIONS:  - Educate patient/family on patient safety including physical limitations  - Instruct patient to call for assistance with activity   - Consider consulting OT/PT to assist with strengthening/mobility based on AM PAC & JH-HLM score  - Consult OT/PT to assist with strengthening/mobility   - Keep Call bell within reach  - Keep bed low and locked with side rails adjusted as appropriate  - Keep care items and personal belongings within reach  - Initiate and maintain comfort rounds  - Make Fall Risk Sign visible to staff  - Offer Toileting every 4 Hours, in advance of need  - Initiate/Maintain 0 alarm  - Obtain necessary fall risk management equipment: none  - Apply yellow socks and bracelet for high fall risk patients  - Consider moving patient to room near nurses station  Outcome: Progressing     Problem: Risk for Self Injury/Neglect  Goal: Verbalize thoughts and feelings  Description: Interventions:  - Assess and re-assess patient's lethality and potential for self-injury  - Engage patient in 1:1 interactions, daily, for a minimum of 15 minutes  - Encourage patient to express feelings, fears, frustrations, hopes  - Establish rapport/trust with patient   Outcome: Progressing

## 2025-05-31 NOTE — ASSESSMENT & PLAN NOTE
Trintellix 10 mg daily for anxiety symptoms.  Atarax 50 mg 3 times daily as needed for severe anxiety and panic attacks.  No associated orders from this encounter found during lookback period of 72 hours.

## 2025-05-31 NOTE — NURSING NOTE
Pt admitted to the unit with the following belongings:      Remains with pt:  Leggings x 2  Underwear x 2  Pjs x 3  Socks x 2 pair   Bra x 3  Long sleeve shirt x 1    EXAM ROOM  Mello bag w/ cPap machine and accessories x 1    Pt storage:#12  Leggings x 2  Underwear x 3  Socks x 3 pair   Bra x 1  Tank top x 1      Contraband: #1  Tablet x 1  Charging cord x 1  Phone-no charging cord x 1      Hospital safe - bag #:  N/a      Medications sent to pharmacy - bag #: [id number]

## 2025-05-31 NOTE — ASSESSMENT & PLAN NOTE
Trintellix 10 mg daily for depressive symptoms.  Plan to increase to 15 mg daily tomorrow.  No associated orders from this encounter found during lookback period of 72 hours.

## 2025-06-01 PROCEDURE — 99232 SBSQ HOSP IP/OBS MODERATE 35: CPT

## 2025-06-01 RX ADMIN — HYDROXYZINE HYDROCHLORIDE 50 MG: 50 TABLET ORAL at 18:03

## 2025-06-01 RX ADMIN — IBUPROFEN 600 MG: 600 TABLET ORAL at 19:22

## 2025-06-01 RX ADMIN — OMEGA-3 FATTY ACIDS CAP 1000 MG 1000 MG: 1000 CAP at 18:03

## 2025-06-01 RX ADMIN — CYANOCOBALAMIN TAB 500 MCG 1000 MCG: 500 TAB at 09:13

## 2025-06-01 RX ADMIN — Medication 3 MG: at 21:22

## 2025-06-01 RX ADMIN — Medication 10000 UNITS: at 09:12

## 2025-06-01 RX ADMIN — IBUPROFEN 400 MG: 400 TABLET, FILM COATED ORAL at 23:41

## 2025-06-01 RX ADMIN — VORTIOXETINE 15 MG: 10 TABLET, FILM COATED ORAL at 09:13

## 2025-06-01 RX ADMIN — Medication 1 TABLET: at 09:13

## 2025-06-01 RX ADMIN — Medication 1000 UNITS: at 09:13

## 2025-06-01 RX ADMIN — TRAZODONE HYDROCHLORIDE 100 MG: 100 TABLET ORAL at 21:22

## 2025-06-01 RX ADMIN — Medication 400 UNITS: at 09:12

## 2025-06-01 RX ADMIN — HYDROXYZINE HYDROCHLORIDE 50 MG: 50 TABLET ORAL at 21:22

## 2025-06-01 RX ADMIN — OMEGA-3 FATTY ACIDS CAP 1000 MG 1000 MG: 1000 CAP at 09:13

## 2025-06-01 RX ADMIN — PROPRANOLOL HYDROCHLORIDE 10 MG: 10 TABLET ORAL at 16:11

## 2025-06-01 NOTE — ASSESSMENT & PLAN NOTE
Increase Trintellix to 20 mg daily for depressive symptoms.  No associated orders from this encounter found during lookback period of 72 hours.

## 2025-06-01 NOTE — NURSING NOTE
Patient has had broken and poor sleep throughout the night. During periods of sleep non-labored breathing noted and patient showed no signs or symptoms of distress. No behaviors overnight. No needs identified currently. Will remain on safety precautions and continual monitoring.

## 2025-06-01 NOTE — PROGRESS NOTES
Progress Note - Behavioral Health   Mario HORTON Eng 50 y.o. female MRN: [id number]  Unit/Bed#: [facility] 218-01 Encounter: [id number]    Assessment & Plan   Principal Problem:    Major depression, recurrent (HCC)  Active Problems:    Attention deficit hyperactivity disorder (ADHD), combined type    Generalized anxiety disorder      Recommended Treatment:     Assessment & Plan  Major depression, recurrent (HCC)  Increase Trintellix to 20 mg daily for depressive symptoms.  No associated orders from this encounter found during lookback period of 72 hours.    Attention deficit hyperactivity disorder (ADHD), combined type  Restart Vyvanse 60 mg daily for ADHD symptoms.  PDMP reviewed.  Patient's  dropped off home supply and labeled bottle, sent to pharmacy.  No associated orders from this encounter found during lookback period of 72 hours.    Generalized anxiety disorder  Increase Trintellix to 20 mg daily for anxiety symptoms.  Atarax 50 mg 3 times daily as needed for severe anxiety and panic attacks.  No associated orders from this encounter found during lookback period of 72 hours.    Continue with group therapy, milieu therapy and occupational therapy.    Continue frequent safety checks and vitals per unit protocol.  Continue with SLIM medical management as indicated  Continue coordinating with case management regarding disposition    Legal Status: 201  Disposition: To be determined, coordinating with case management    Case discussed with treatment team.  Risks, benefits and possible side effects of Medications: Risks, benefits, and possible side effects of medications have been explained to the patient, who verbalizes understanding    ------------------------------------------------------------    Subjective: Patient's chart was reviewed, and patient's progress and plan was discussed with treatment team. Per nursing report, Mario has been struggling with sleep, social with select peers and ultimately cooperative  "on the unit and compliant with medications. Patient has remained in behavioral control for the last 24 hours.    Mario was evaluated this morning for continuity of care. On examination, Mario is stating she feels tired from poor sleep and remains pharmacologically preoccupied and believes herself to be a fast metabolizer of medication, stating she almost never gets side effects from medications and feels they are always less than adequate at which time she requests an expedited titration of Trintellix at which time psychopharmacologic education was provided and risks and benefits of titration were discussed at which time she expressed understanding and were amenable to recommendations. She denies adverse effects from medications. She denies suicidal ideation, homicidal ideation, auditory hallucinations, and visual hallucinations. Her goals for today are to remain in behavioral control and medication compliant and continue developing coping skills.    VS: Reviewed, BMI noted to be 36.61, mildly hypertensive at 137/93 mmHg, otherwise within normal limits    Progress Toward Goals: Continued improvement    Psychiatric Review of Systems:  Behavior over the last 24 hours: improved  Sleep: insomnia  Appetite: adequate  Medication side effects: none verbalized  Medical ROS: Complete review of systems is negative except as noted above.    Vital signs in last 24 hours:  Temp:  [97.6 °F (36.4 °C)-98.3 °F (36.8 °C)] 97.6 °F (36.4 °C)  HR:  [] 94  BP: (126-140)/() 137/93  Resp:  [17] 17  SpO2:  [96 %] 96 %  O2 Device: None (Room air)    Mental Status Exam:    Appearance:  alert, good eye contact, appears stated age, casually dressed, and appropriate grooming and hygiene   Behavior:  calm, cooperative, and sitting comfortably   Speech:  spontaneous, clear, normal rate, normal volume, and coherent   Mood:  \"A little better but still low\"   Affect:  mood-congruent, slightly dysphoric   Thought Process:  generally " linear and goal-directed but perseverative at times   Associations: intact associations   Thought Content:  no verbalized delusions or overt paranoia, pharmacologic preoccupation   Perceptual Disturbances: no reported hallucinations and does not appear to be responding to internal stimuli at this time   Risk Potential: Suicidal ideation - None at present  Homicidal ideation - None at present  Potential for aggression - Not at present   Sensorium:  oriented to person, place, and time/date   Memory:  recent and remote memory grossly intact   Consciousness:  alert and awake   Attention/Concentration: attention span and concentration appear shorter than expected for age   Insight:  improving   Judgment: improving   Gait/Station: normal gait/station   Motor Activity: no abnormal movements     Current Medications:  Current Facility-Administered Medications   Medication Dose Route Frequency Provider Last Rate    aluminum-magnesium hydroxide-simethicone  30 mL Oral Q4H PRN Dixie Gardner MD      haloperidol lactate  2.5 mg Intramuscular Q4H PRN Max 4/day Dixie Gardner MD      And    LORazepam  1 mg Intramuscular Q4H PRN Max 4/day Dixie Gardner MD      And    benztropine  0.5 mg Intramuscular Q4H PRN Max 4/day Dixie Gardner MD      haloperidol lactate  5 mg Intramuscular Q4H PRN Max 4/day Dixie Gardner MD      And    LORazepam  2 mg Intramuscular Q4H PRN Max 4/day Dixie Gardner MD      And    benztropine  1 mg Intramuscular Q4H PRN Max 4/day Dixie Gardner MD      benztropine  1 mg Intramuscular Q4H PRN Max 6/day Dixie Gardner MD      benztropine  1 mg Oral Q4H PRN Max 6/day Dixie Gardner MD      bisacodyl  10 mg Rectal Daily PRN Dixie Gardner MD      cholecalciferol  1,000 Units Oral Daily Alfonzo Mclaughlin MD      cyanocobalamin  1,000 mcg Oral Daily Erum Hartman PA-C      fish oil  1,000 mg Oral BID Erum Hartman PA-C      haloperidol  1 mg Oral Q6H PRN Dixie NUÑEZ  MD Jj      haloperidol  2.5 mg Oral Q4H PRN Max 4/day Dixie Gardner MD      haloperidol  5 mg Oral Q4H PRN Max 4/day Dixie Gardner MD      hydrOXYzine HCL  50 mg Oral TID PRN Jermaine Barclay,       ibuprofen  400 mg Oral Q4H PRN Dixie Gardner MD      ibuprofen  600 mg Oral Q6H PRN Dixie Gardner MD      ibuprofen  800 mg Oral Q8H PRN Dixie Gardner MD      LORazepam  2 mg Intramuscular Q6H PRN Dixie Gardner MD      melatonin  3 mg Oral HS Dixie Gardner MD      multivitamin-minerals  1 tablet Oral Daily Erum Hartman PA-C      NON FORMULARY  60 mg Oral Daily Jermaine Barclay,       polyethylene glycol  17 g Oral Daily PRN Dixie Gardner MD      propranolol  10 mg Oral Q8H PRN Dixie Gardner MD      senna-docusate sodium  1 tablet Oral Daily PRN Dixie Gardner MD      traZODone  100 mg Oral HS PRN Jermaine Barclay DO      vitamin A  10,000 Units Oral Daily Erum Hartman PA-C      vitamin E  400 Units Oral Daily Erum Hartman PA-C      vortioxetine  15 mg Oral Daily Jermaine Barclay DO         Behavioral Health Medications: all current active meds have been reviewed. Changes as in plan section above.    Laboratory results:  I have personally reviewed all pertinent laboratory/tests results.   No results found for this or any previous visit (from the past 48 hours).     This note has been constructed using a voice recognition system. There may be translation, syntax, or grammatical errors. If you have any questions, please contact the dictating author.    Jermaine Barclay DO  Psychiatry Residency, PGY-III

## 2025-06-01 NOTE — ASSESSMENT & PLAN NOTE
Increase Trintellix to 20 mg daily for anxiety symptoms.  Atarax 50 mg 3 times daily as needed for severe anxiety and panic attacks.  No associated orders from this encounter found during lookback period of 72 hours.

## 2025-06-01 NOTE — NURSING NOTE
"Patient observed within the milieu, intermittently socializing with peers or on the phone with loved ones. She reports feeling very remorseful for her suicide attempt as she states it was an impulsive decision that could have permanently affected her children's life. She states she has every will to live. She does admit that she is currently having marital issues that has resulted in her  telling her he's \"done\" and checked out of the marriage. She admits to feelings of betrayal and resentment due to recent commentary he made about the relationship, her use of an ambulance, and comments made about their child who has special needs. Patient states she has realized the many options and supports she has in her life and realizes she will be okay. She admits to needing to get a better hold on her impulsivity and depression, but she denies SI now. Compliant with medication. Q15 minute checks ongoing.   "

## 2025-06-01 NOTE — PLAN OF CARE
Problem: Ineffective Coping  Goal: Identifies healthy coping skills  Outcome: Progressing  Goal: Demonstrates healthy coping skills  Outcome: Progressing

## 2025-06-01 NOTE — PROGRESS NOTES
"Progress Note - Mario Palm 50 y.o. female MRN: [id number]    Unit/Bed#: [facility] 218-01 Encounter: [id number]        Subjective:   Patient seen and examined at bedside after reviewing the chart and discussing the case with the caring staff.      Patient examined at bedside.  Patient has developed petechial rash on her bilateral extremity.  Patient denied any symptoms including pruritus.  This rash has spread to her buttock.    On review of patient's labs patient's vitamin D level was found to be low 28.3 and B12 is also low 336.    Physical Exam   Vitals: Blood pressure 137/93, pulse 94, temperature 97.6 °F (36.4 °C), temperature source Temporal, resp. rate 17, height 5' 8\" (1.727 m), weight 109 kg (240 lb 12.8 oz), last menstrual period 04/29/2021, SpO2 96%.,Body mass index is 36.61 kg/m².  Constitutional: He appears well-developed.   HEENT: PERR, EOMI, MMM  Cardiovascular: Normal rate and regular rhythm.    Pulmonary/Chest: Effort normal and breath sounds normal.   Abdomen: Soft, + BS, NT    Assessment/Plan:  Mario Palm is a(n) 50 y.o. female with MDD.      JOSEPH.  CPAP at bedtime.   Hyperlipidemia.  Continue fish oil 1,000 mg twice daily.   Chronic pain disorder.  Ibuprofen as needed.   Vitamin deficiencies.  Continue daily home supplements: MVI, vitamin A, Vitamin E, Vitamin B12.   HRT.  Patient on estradiol-norethindrone (CombiPatch) 0.05-0.25 mg/day 2x weekly.  May have someone bring in from home.  Vitamin D deficiency.  I will put the patient on vitamin D supplements.  Vitamin B12 deficiency.  I will put the patient on vitamin B12 supplements.  Petechial rash over bilateral lower extremity.  We will monitor.  Patient CBC was within normal limits.  I may consider Medrol Dosepak if it becomes symptomatic  "

## 2025-06-02 ENCOUNTER — TELEPHONE (OUTPATIENT)
Age: 50
End: 2025-06-02

## 2025-06-02 PROCEDURE — 93005 ELECTROCARDIOGRAM TRACING: CPT

## 2025-06-02 PROCEDURE — 99232 SBSQ HOSP IP/OBS MODERATE 35: CPT | Performed by: PSYCHIATRY & NEUROLOGY

## 2025-06-02 RX ORDER — CLONIDINE HYDROCHLORIDE 0.1 MG/1
0.1 TABLET ORAL 2 TIMES DAILY PRN
Status: DISCONTINUED | OUTPATIENT
Start: 2025-06-02 | End: 2025-06-06 | Stop reason: HOSPADM

## 2025-06-02 RX ADMIN — Medication 10000 UNITS: at 08:51

## 2025-06-02 RX ADMIN — CYANOCOBALAMIN TAB 500 MCG 1000 MCG: 500 TAB at 08:48

## 2025-06-02 RX ADMIN — Medication 3 MG: at 22:21

## 2025-06-02 RX ADMIN — TRAZODONE HYDROCHLORIDE 100 MG: 100 TABLET ORAL at 22:21

## 2025-06-02 RX ADMIN — OMEGA-3 FATTY ACIDS CAP 1000 MG 1000 MG: 1000 CAP at 08:49

## 2025-06-02 RX ADMIN — LISDEXAMFETAMINE DIMESYLATE 60 MG: 60 CAPSULE ORAL at 08:49

## 2025-06-02 RX ADMIN — Medication 400 UNITS: at 08:51

## 2025-06-02 RX ADMIN — AMOXICILLIN AND CLAVULANATE POTASSIUM 1 TABLET: 875; 125 TABLET, FILM COATED ORAL at 15:19

## 2025-06-02 RX ADMIN — LORAZEPAM 2 MG: 2 INJECTION INTRAMUSCULAR; INTRAVENOUS at 21:40

## 2025-06-02 RX ADMIN — CLONIDINE HYDROCHLORIDE 0.1 MG: 0.1 TABLET ORAL at 20:09

## 2025-06-02 RX ADMIN — Medication 1000 UNITS: at 08:49

## 2025-06-02 RX ADMIN — VORTIOXETINE 20 MG: 10 TABLET, FILM COATED ORAL at 08:49

## 2025-06-02 RX ADMIN — Medication 1 TABLET: at 08:48

## 2025-06-02 RX ADMIN — AMOXICILLIN AND CLAVULANATE POTASSIUM 1 TABLET: 875; 125 TABLET, FILM COATED ORAL at 21:42

## 2025-06-02 NOTE — PLAN OF CARE
Problem: Depression  Goal: Verbalize thoughts and feelings  Description: Interventions:  - Assess and re-assess patient's level of risk   - Engage patient in 1:1 interactions, daily, for a minimum of 15 minutes   - Encourage patient to express feelings, fears, frustrations, hopes   6/2/2025 0402 by Madyson Abernathy RN  Outcome: Progressing     Problem: Depression  Goal: Refrain from isolation  Description: Interventions:  - Develop a trusting relationship   - Encourage socialization   6/2/2025 0325 by Madyson Abernathy RN  Outcome: Progressing     Problem: Depression  Goal: Refrain from self-neglect  Outcome: Progressing

## 2025-06-02 NOTE — TELEPHONE ENCOUNTER
Received a call from Henrietta  with [facility] in [place] calling to inform PCP that the patient has been admitted into their behavioral health unit since May 29th.       They will call back upon  discharge to schedule a TCM with PCP.       LIONEL

## 2025-06-02 NOTE — PLAN OF CARE
Problem: Ineffective Coping  Goal: Participates in unit activities  Description: Interventions:  - Provide therapeutic environment   - Provide required programming   - Redirect inappropriate behaviors   Outcome: Progressing   She attended groups over the weekend.

## 2025-06-02 NOTE — ASSESSMENT & PLAN NOTE
Increase Trintellix to 20 mg daily for anxiety symptoms.  Atarax 50 mg 3 times daily as needed for severe anxiety and panic attacks.  No associated orders from this encounter found during lookback period of 72 hours.  No changes at this time due to recent increase.

## 2025-06-02 NOTE — CASE MANAGEMENT
CM placed call to patient's Psychiatric Med Mgmt provider/ Dr. Garcia's office: [phone] for admission notification. Spoke with office staff who reported awareness of patient's IP status. Office will schedule a KEYSHAWN appt with d/c notification.    TEE placed call to patient's Therapist office, Heather Moy: [phone] for admission notification. CM left VM message with CM contact name and number for return call for collaboration.    CM placed call to patient's PCP office, Dr. Harris: [phone] for admission notification. Office staff will schedule a KEYSHAWN appt with d/c notification.

## 2025-06-02 NOTE — NURSING NOTE
Patient observed within the milieu, socializing with select peers. She reports her mood remains the same, reporting moderate anxiety and depression, however admits she is more focused on somatic issues including a headache and a rash on her lower legs. She reports declining intervention on her rash until tomorrow but she did utilize prn ibuprofen this evening. She continues to deny SI/HI and hallucinations. Makes her needs known. Her affect remains constricted but she brightens on approach. She continues to report poor rest. She hopes to be discharged on 72 hour notice tomorrow but states she accepted whatever the outcome. Q15 minute checks ongoing.

## 2025-06-02 NOTE — ASSESSMENT & PLAN NOTE
Restart Vyvanse 60 mg daily for ADHD symptoms.  PDMP reviewed.  Patient's  dropped off home supply and labeled bottle, sent to pharmacy.  No associated orders from this encounter found during lookback period of 72 hours.  No changes at this time.

## 2025-06-02 NOTE — PROGRESS NOTES
05/30/25 0900   Team Meeting   Meeting Type Daily Rounds   Team Members Present   Team Members Present Physician;;Nurse;Occupational Therapist   Physician Team Member MD Katarina Mars CRNP,   Nursing Team Member KAMLA Burt   Care Management Team Member MS Dante   OT Team Member Ze Quesada LPC   Patient/Family Present   Patient Present No   Patient's Family Present No   201. New admit.  Came to ED for OD. Reported  is stressor. Denies all. Eating/sleeping well. Med compliant. D/C date not known due to med adjustment.

## 2025-06-02 NOTE — PROGRESS NOTES
"Progress Note - Mario Palm 50 y.o. female MRN: [id number]    Unit/Bed#: [facility] 218-01 Encounter: [id number]        Subjective:   Patient seen and examined at bedside after reviewing the chart and discussing the case with the caring staff.      Patient examined at bedside.  Patient reports rash on bilateral legs that began Saturday.  Denies itching or pain.  No new drugs.  Does not believe it is spreading.  Patient denies any other symptoms other than a tooth infections.  Is reporting pain and drainage.  Reports recent tooth procedure.      Patient is a possible discharge today on 72 hr.     Physical Exam   Vitals: Blood pressure 142/84, pulse 105, temperature 97.5 °F (36.4 °C), temperature source Temporal, resp. rate 18, height 5' 8\" (1.727 m), weight 109 kg (240 lb 12.8 oz), last menstrual period 04/29/2021, SpO2 95%.,Body mass index is 36.61 kg/m².  Constitutional: He appears well-developed.   HEENT: PERR, EOMI, MMM  Cardiovascular: Normal rate and regular rhythm.    Pulmonary/Chest: Effort normal and breath sounds normal.   Abdomen: Soft, + BS, NT    Assessment/Plan:  Mario Palm is a(n) 50 y.o. female with MDD.      Medical Clearance: Patient is medically cleared for discharge. All prescriptions have been sent to pharmacy.     JOSEPH.  CPAP at bedtime.   Hyperlipidemia.  Continue fish oil 1,000 mg twice daily.   Chronic pain disorder.  Ibuprofen as needed.   Vitamin deficiencies.  Continue daily home supplements: MVI, Vitamin A, Vitamin E, Vitamin B12, Vitamin D.   HRT.  Patient on estradiol-norethindrone (CombiPatch) 0.05-0.25 mg/day 2x weekly.  May have someone bring in from home.  Petechial rash, BL LE.   Patient asymptomatic other than signs of dental infection.  Repeat CBC, CMP, UA pending.  Cont to monitor and follow up with PCP outpt.   Dental infection.  Start Augmentin 875-125 mg twice daily x 7 days 6/2.  Chlorhexidine rinse twice daily.  Motrin as needed for pain.     The patient was discussed with Dr." Arnoldo and he is in agreement with the above note.

## 2025-06-02 NOTE — PROGRESS NOTES
Met with Mabel completed her relapse prevention. She was able to identify her protective factors, warning signs, stressors, coping skills and support people. We discussed the community support due to her signing her 72 hour notice and being discharged.

## 2025-06-02 NOTE — ASSESSMENT & PLAN NOTE
Increase Trintellix to 20 mg daily for depressive symptoms.  No associated orders from this encounter found during lookback period of 72 hours.  No changes at this time.

## 2025-06-02 NOTE — NURSING NOTE
Patient observed having sporadic and poor sleep overnight. Non labored breathing observed during periods of rest. No acute behaviors observed. Will remain on safety precautions and continual monitoring.

## 2025-06-02 NOTE — PLAN OF CARE
Problem: Risk for Self Injury/Neglect  Goal: Refrain from harming self  Description: Interventions:  - Monitor patient closely, per order  - Develop a trusting relationship  - Supervise medication ingestion, monitor effects and side effects   Outcome: Progressing     Problem: Depression  Goal: Verbalize thoughts and feelings  Description: Interventions:  - Assess and re-assess patient's level of risk   - Engage patient in 1:1 interactions, daily, for a minimum of 15 minutes   - Encourage patient to express feelings, fears, frustrations, hopes   Outcome: Progressing  Goal: Refrain from isolation  Description: Interventions:  - Develop a trusting relationship   - Encourage socialization   Outcome: Progressing

## 2025-06-02 NOTE — PROGRESS NOTES
Progress Note - Behavioral Health   Name: Mario Palm 50 y.o. female I MRN: [id number]  Unit/Bed#: -01 I Date of Admission: 5/29/2025   Date of Service: 6/2/2025 I Hospital Day: 4    Assessment & Plan  Major depression, recurrent (HCC)  Increase Trintellix to 20 mg daily for depressive symptoms.  No associated orders from this encounter found during lookback period of 72 hours.  No changes at this time.  Attention deficit hyperactivity disorder (ADHD), combined type  Restart Vyvanse 60 mg daily for ADHD symptoms.  PDMP reviewed.  Patient's  dropped off home supply and labeled bottle, sent to pharmacy.  No associated orders from this encounter found during lookback period of 72 hours.  No changes at this time.   Generalized anxiety disorder  Increase Trintellix to 20 mg daily for anxiety symptoms.  Atarax 50 mg 3 times daily as needed for severe anxiety and panic attacks.  No associated orders from this encounter found during lookback period of 72 hours.  No changes at this time due to recent increase.    Current Medications:    Current Facility-Administered Medications:     amoxicillin-clavulanate (AUGMENTIN) 875-125 mg per tablet 1 tablet, Oral, Q12H GERMÁN    Cholecalciferol (VITAMIN D3) tablet 1,000 Units, Oral, Daily    cyanocobalamin (VITAMIN B-12) tablet 1,000 mcg, Oral, Daily    fish oil capsule 1,000 mg, Oral, BID    lisdexamfetamine (VYVANSE) capsule 60 mg, Oral, Daily    melatonin tablet 3 mg, Oral, HS    multivitamin-minerals (CENTRUM) tablet 1 tablet, Oral, Daily    vitamin A capsule 10,000 Units, Oral, Daily    vitamin E (TOCOPHEROL) capsule 400 Units, Oral, Daily    vortioxetine (TRINTELLIX) tablet 20 mg, Oral, Daily      Risks/Benefits of Treatment:     Risks, benefits, and possible side effects of medications explained to patient and patient verbalizes understanding and agreement for treatment.    Progress Toward Goals: progressing    Treatment Planning:     All current active  medications have been reviewed.  Continue to monitor response to treatment and assess for potential side effects of medications.  Encourage group therapy, milieu therapy and occupational therapy.  Collaboration with medical service for medical comorbidities as indicated.  Behavioral Health checks for safety monitoring.  Estimated Discharge Day:  7 days (6/9/2025)        Subjective     Behavior over the last 24 hours: some improvement.  As per report from staff the patient remains compliant with medication and meals pleasant and cooperative.  She has been social with peers.  Denies any symptoms except some anxiety and depression.  No behaviors are reported.  The patient had signed a 72-hour notice earlier but agreed to rescind it if she was able to talk to her .  She did speak to the  and was agreeable to stay for further treatment.  The patient is currently on Trintellix and is also taking Vyvanse.  She reports that she is still little anxious and difficulty sleeping because of not being on 200 mg of trazodone.  Discussed about gradual titration because of the recent changes.      Sleep: slept off and on  Appetite: fair  Medication side effects: No  ROS: review of systems as noted above in HPI/Subjective report, all other systems are negative    Objective :  Temp:  [97.5 °F (36.4 °C)] 97.5 °F (36.4 °C)  HR:  [105-144] 144  BP: (142-187)/() 180/100  Resp:  [18] 18  SpO2:  [95 %-96 %] 96 %  O2 Device: None (Room air)    Mental Status Evaluation:    Appearance:  adequate grooming   Behavior:  pleasant, cooperative, restless   Speech:  normal volume, normal pitch, slightly pressured   Mood:  anxious   Affect:  appropriate   Thought Process:  organized   Thought Content:  no overt delusions   Perceptual Disturbances: no auditory hallucinations, no visual hallucinations   Risk Potential: Suicidal Ideation -  None at present  Homicidal Ideation -  None at present  Potential for Aggression - Not at  "present   Sensorium:  oriented to person, place, and time/date   Memory:  recent and remote memory grossly intact   Consciousness:  alert and awake   Attention/Concentration: attention span and concentration are age appropriate   Insight:  limited   Judgment: limited   Gait/Station: normal gait/station   Motor Activity: no abnormal movements       Lab Results: I have reviewed the following results:  Results from the past 24 hours: No results found for this or any previous visit (from the past 24 hours).    Administrative Statements     Counseling / Coordination of Care:   Patient's progress discussed with staff in treatment team meeting.  Medication changes reviewed with staff in treatment team meeting.    Portions of the record may have been created with voice recognition software. Occasional wrong word or \"sound a like\" substitutions may have occurred due to the inherent limitations of voice recognition software. Read the chart carefully and recognize, using context, where substitutions have occurred.    Alejandro Mars MD 06/02/25  "

## 2025-06-03 LAB
ALBUMIN SERPL BCG-MCNC: 4.2 G/DL (ref 3.5–5)
ALP SERPL-CCNC: 62 U/L (ref 34–104)
ALT SERPL W P-5'-P-CCNC: 16 U/L (ref 7–52)
ANION GAP SERPL CALCULATED.3IONS-SCNC: 7 MMOL/L (ref 4–13)
AST SERPL W P-5'-P-CCNC: 17 U/L (ref 13–39)
ATRIAL RATE: 100 BPM
BASOPHILS # BLD AUTO: 0.04 THOUSANDS/ÂΜL (ref 0–0.1)
BASOPHILS NFR BLD AUTO: 1 % (ref 0–1)
BILIRUB SERPL-MCNC: 0.67 MG/DL (ref 0.2–1)
BILIRUB UR QL STRIP: NEGATIVE
BUN SERPL-MCNC: 18 MG/DL (ref 5–25)
CALCIUM SERPL-MCNC: 9.7 MG/DL (ref 8.4–10.2)
CHLORIDE SERPL-SCNC: 107 MMOL/L (ref 96–108)
CLARITY UR: CLEAR
CO2 SERPL-SCNC: 24 MMOL/L (ref 21–32)
COLOR UR: YELLOW
CREAT SERPL-MCNC: 0.69 MG/DL (ref 0.6–1.3)
EOSINOPHIL # BLD AUTO: 0.13 THOUSAND/ÂΜL (ref 0–0.61)
EOSINOPHIL NFR BLD AUTO: 2 % (ref 0–6)
ERYTHROCYTE [DISTWIDTH] IN BLOOD BY AUTOMATED COUNT: 13.9 % (ref 11.6–15.1)
GFR SERPL CREATININE-BSD FRML MDRD: 101 ML/MIN/1.73SQ M
GLUCOSE P FAST SERPL-MCNC: 115 MG/DL (ref 65–99)
GLUCOSE SERPL-MCNC: 115 MG/DL (ref 65–140)
GLUCOSE UR STRIP-MCNC: NEGATIVE MG/DL
HCT VFR BLD AUTO: 41.7 % (ref 34.8–46.1)
HGB BLD-MCNC: 13.7 G/DL (ref 11.5–15.4)
HGB UR QL STRIP.AUTO: NEGATIVE
IMM GRANULOCYTES # BLD AUTO: 0.03 THOUSAND/UL (ref 0–0.2)
IMM GRANULOCYTES NFR BLD AUTO: 0 % (ref 0–2)
INR PPP: 0.99 (ref 0.85–1.19)
KETONES UR STRIP-MCNC: NEGATIVE MG/DL
LEUKOCYTE ESTERASE UR QL STRIP: NEGATIVE
LYMPHOCYTES # BLD AUTO: 1.92 THOUSANDS/ÂΜL (ref 0.6–4.47)
LYMPHOCYTES NFR BLD AUTO: 27 % (ref 14–44)
MCH RBC QN AUTO: 28.9 PG (ref 26.8–34.3)
MCHC RBC AUTO-ENTMCNC: 32.9 G/DL (ref 31.4–37.4)
MCV RBC AUTO: 88 FL (ref 82–98)
MONOCYTES # BLD AUTO: 0.55 THOUSAND/ÂΜL (ref 0.17–1.22)
MONOCYTES NFR BLD AUTO: 8 % (ref 4–12)
NEUTROPHILS # BLD AUTO: 4.36 THOUSANDS/ÂΜL (ref 1.85–7.62)
NEUTS SEG NFR BLD AUTO: 62 % (ref 43–75)
NITRITE UR QL STRIP: NEGATIVE
NRBC BLD AUTO-RTO: 0 /100 WBCS
P AXIS: 55 DEGREES
PH UR STRIP.AUTO: 6 [PH]
PLATELET # BLD AUTO: 340 THOUSANDS/UL (ref 149–390)
PMV BLD AUTO: 10.8 FL (ref 8.9–12.7)
POTASSIUM SERPL-SCNC: 4 MMOL/L (ref 3.5–5.3)
PR INTERVAL: 138 MS
PROT SERPL-MCNC: 7.2 G/DL (ref 6.4–8.4)
PROT UR STRIP-MCNC: NEGATIVE MG/DL
PROTHROMBIN TIME: 13.6 SECONDS (ref 12.3–15)
QRS AXIS: 0 DEGREES
QRSD INTERVAL: 84 MS
QT INTERVAL: 356 MS
QTC INTERVAL: 460 MS
RBC # BLD AUTO: 4.74 MILLION/UL (ref 3.81–5.12)
SODIUM SERPL-SCNC: 138 MMOL/L (ref 135–147)
SP GR UR STRIP.AUTO: 1.02
T WAVE AXIS: 37 DEGREES
UROBILINOGEN UR QL STRIP.AUTO: 0.2 E.U./DL
VENTRICULAR RATE: 100 BPM
WBC # BLD AUTO: 7.03 THOUSAND/UL (ref 4.31–10.16)

## 2025-06-03 PROCEDURE — 99232 SBSQ HOSP IP/OBS MODERATE 35: CPT | Performed by: PSYCHIATRY & NEUROLOGY

## 2025-06-03 PROCEDURE — 93010 ELECTROCARDIOGRAM REPORT: CPT | Performed by: INTERNAL MEDICINE

## 2025-06-03 PROCEDURE — 81003 URINALYSIS AUTO W/O SCOPE: CPT

## 2025-06-03 PROCEDURE — 80053 COMPREHEN METABOLIC PANEL: CPT

## 2025-06-03 PROCEDURE — 85025 COMPLETE CBC W/AUTO DIFF WBC: CPT

## 2025-06-03 PROCEDURE — 85610 PROTHROMBIN TIME: CPT

## 2025-06-03 RX ORDER — DIPHENHYDRAMINE HCL 25 MG
50 TABLET ORAL EVERY 6 HOURS PRN
Status: DISCONTINUED | OUTPATIENT
Start: 2025-06-03 | End: 2025-06-06 | Stop reason: HOSPADM

## 2025-06-03 RX ORDER — TRAZODONE HYDROCHLORIDE 100 MG/1
200 TABLET ORAL
Status: DISCONTINUED | OUTPATIENT
Start: 2025-06-03 | End: 2025-06-04

## 2025-06-03 RX ADMIN — AMOXICILLIN AND CLAVULANATE POTASSIUM 1 TABLET: 875; 125 TABLET, FILM COATED ORAL at 21:10

## 2025-06-03 RX ADMIN — CYANOCOBALAMIN TAB 500 MCG 1000 MCG: 500 TAB at 08:05

## 2025-06-03 RX ADMIN — Medication 400 UNITS: at 08:08

## 2025-06-03 RX ADMIN — Medication 10000 UNITS: at 08:09

## 2025-06-03 RX ADMIN — OMEGA-3 FATTY ACIDS CAP 1000 MG 1000 MG: 1000 CAP at 17:46

## 2025-06-03 RX ADMIN — Medication 1 TABLET: at 08:05

## 2025-06-03 RX ADMIN — DIPHENHYDRAMINE HYDROCHLORIDE 50 MG: 25 TABLET ORAL at 11:55

## 2025-06-03 RX ADMIN — PROPRANOLOL HYDROCHLORIDE 10 MG: 10 TABLET ORAL at 22:44

## 2025-06-03 RX ADMIN — AMOXICILLIN AND CLAVULANATE POTASSIUM 1 TABLET: 875; 125 TABLET, FILM COATED ORAL at 08:05

## 2025-06-03 RX ADMIN — HYDROXYZINE HYDROCHLORIDE 50 MG: 50 TABLET ORAL at 23:36

## 2025-06-03 RX ADMIN — VORTIOXETINE 20 MG: 10 TABLET, FILM COATED ORAL at 08:06

## 2025-06-03 RX ADMIN — PROPRANOLOL HYDROCHLORIDE 10 MG: 10 TABLET ORAL at 14:14

## 2025-06-03 RX ADMIN — OMEGA-3 FATTY ACIDS CAP 1000 MG 1000 MG: 1000 CAP at 08:05

## 2025-06-03 RX ADMIN — PROPRANOLOL HYDROCHLORIDE 10 MG: 10 TABLET ORAL at 05:04

## 2025-06-03 RX ADMIN — TRAZODONE HYDROCHLORIDE 200 MG: 100 TABLET ORAL at 21:10

## 2025-06-03 RX ADMIN — LISDEXAMFETAMINE DIMESYLATE 60 MG: 60 CAPSULE ORAL at 08:11

## 2025-06-03 RX ADMIN — Medication 3 MG: at 21:10

## 2025-06-03 RX ADMIN — Medication 1000 UNITS: at 08:06

## 2025-06-03 NOTE — TREATMENT TEAM
06/02/25 2140   Toro Anxiety Scale   Anxious Mood 4   Tension 4   Fears 4   Insomnia 0   Intellectual 2   Depressed Mood 2   Somatic Complaints: Muscular 0   Somatic Complaints: Sensory 0   Cardiovascular Symptoms 3   Respiratory Symptoms 3   Gastrointestinal Symptoms 0   Genitourinary Symptoms 0   Autonomic Symptoms 0   Behavior at Interview 4   Toro Anxiety Score 26     Patient was administered prn IM ativan 2 mg due to concerns of her rash, blood pressure, and abscess, feeling powerlessness due to admission and her hx of cardiac issues within her family. Provider was contacted, breathing techniques were administered but the patient ultimately was overwhelmed. EKG obtained was unremarkable, NSR and normal EKG. Patient is now calmly laying in bed. Newest BP and pulse were within normal limits.

## 2025-06-03 NOTE — NURSING NOTE
"Patient pleasant and cooperative with staff. Endorses ongoing and anxiety and depression, but \"feels improved, good.\" Denies SI/HI or AVH. Patient visible and selectively social on unit. Compliant with scheduled medications.    Patient focused on medications: \"They still have not gotten my night medications right. I take Ambien. I know they won't order that but then my trazodone should be increased.\" Patient reports she did sleep last night, after \"using multiple PRNs because of my blood pressure. I worry I won't sleep tonight.\" Patient requesting to be ordered Trazodone 200 mg for sleep. Provider notified.     Patient presents with bilateral rash anterior and posterior legs; rash ascending up her legs. Legs and ankles presents with non pitting edema. Rash is red and spotted. No open areas or blistering present. Patient denies itching, pain or tingling. Patient describes, \"I would not know its there, but when I press on the bigger spots, it feels uncomfortable.\" Per Provider Katarina, PRN benadryl given. Benadryl ineffective. No changes to rash. Provider Devan Mars, and Katarina updated frequently throughout the day about rash. UA and labs ordered. No new orders at this time. Will continue to monitor rash and photo uploaded to chart.    Will continue to monitor. Continual q15 min rounding and safety checks in place.    "

## 2025-06-03 NOTE — ASSESSMENT & PLAN NOTE
Restart Vyvanse 60 mg daily for ADHD symptoms.  PDMP reviewed.  Patient's  dropped off home supply and labeled bottle, sent to pharmacy.  No associated orders from this encounter found during lookback period of 72 hours.  No changes at this time.  Her blood pressure was elevated yesterday reaching up 264/108 with increased pulse rate however the blood pressure is down to 130/80 in the morning and later on 119/92.  Blood pressure monitoring will continue while she is on Vyvanse.

## 2025-06-03 NOTE — NURSING NOTE
Assessment done to patient's B/L legs with another nurse, areas feel raised. No open areas, no weeping noted. Pt. Presents with b/l edema, nonpitting. Pt. Denies pain/discomfort. Rash isolated to B/L legs. No rash noted on trunk/arms. New pictures uploaded to chart. Q 15 maintained.

## 2025-06-03 NOTE — PROGRESS NOTES
"Progress Note - Mario Palm 50 y.o. female MRN: [id number]    Unit/Bed#: [facility] 218-01 Encounter: [id number]        Subjective:   Patient seen and examined at bedside after reviewing the chart and discussing the case with the caring staff.      Patient examined at bedside.  Patient continues with petechial rash on bilateral legs however patient believes it is improving.  Denies pain or itchiness.  No areas of blistering.  No new medications.  No known allergies.  No personal or family history of clotting disorders or vasculitis.  Patient denies fever, chills, chest pain, shortness of breath, urinary symptoms.  Patient started on Augmentin for dental infection yesterday, symptoms have already improved.  BP elevated last night.  Was given Catapres 0.1 mg but did not appear to help BP.  Once nursing staff used larger cuff, BP was normal.    EKG normal.   CBC and CMP normal.  UA pending.        Physical Exam   Vitals: Blood pressure 119/92, pulse 86, temperature 98 °F (36.7 °C), temperature source Temporal, resp. rate 18, height 5' 8\" (1.727 m), weight 109 kg (240 lb 12.8 oz), last menstrual period 04/29/2021, SpO2 96%.,Body mass index is 36.61 kg/m².  Constitutional: He appears well-developed.   HEENT: PERR, EOMI, MMM  Cardiovascular: Normal rate and regular rhythm.    Pulmonary/Chest: Effort normal and breath sounds normal.   Abdomen: Soft, + BS, NT    Assessment/Plan:  Mario Palm is a(n) 50 y.o. female with MDD.     JOSEPH.  CPAP at bedtime.   Hyperlipidemia.  Continue fish oil 1,000 mg twice daily.   Chronic pain disorder.  Ibuprofen as needed.   Vitamin deficiencies.  Continue daily home supplements: MVI, Vitamin A, Vitamin E, Vitamin B12, Vitamin D.   HRT.  Patient on estradiol-norethindrone (CombiPatch) 0.05-0.25 mg/day 2x weekly.  May have someone bring in from home.  Petechial rash, BL LE.   Patient asymptomatic other than signs of dental infection.  Repeat CBC, CMP WNL.  UA pending.  Cont to monitor and follow " up with PCP outpt.   Dental infection.  Start Augmentin 875-125 mg twice daily x 7 days 6/2.  Chlorhexidine rinse twice daily.  Motrin as needed for pain.     The patient was discussed with Dr. Mclaughlin and he is in agreement with the above note.

## 2025-06-03 NOTE — PROGRESS NOTES
Progress Note - Behavioral Health   Name: Mario Palm 50 y.o. female I MRN: [id number]  Unit/Bed#: -01 I Date of Admission: 5/29/2025   Date of Service: 6/3/2025 I Hospital Day: 5    Assessment & Plan  Major depression, recurrent (HCC)  Increase Trintellix to 20 mg daily for depressive symptoms.  No associated orders from this encounter found during lookback period of 72 hours.  No changes at this time.  Attention deficit hyperactivity disorder (ADHD), combined type  Restart Vyvanse 60 mg daily for ADHD symptoms.  PDMP reviewed.  Patient's  dropped off home supply and labeled bottle, sent to pharmacy.  No associated orders from this encounter found during lookback period of 72 hours.  No changes at this time.  Her blood pressure was elevated yesterday reaching up 264/108 with increased pulse rate however the blood pressure is down to 130/80 in the morning and later on 119/92.  Blood pressure monitoring will continue while she is on Vyvanse.   Generalized anxiety disorder  Increase Trintellix to 20 mg daily for anxiety symptoms.  Atarax 50 mg 3 times daily as needed for severe anxiety and panic attacks.  No associated orders from this encounter found during lookback period of 72 hours.  No changes at this time due to recent increase.    Current Medications:    Current Facility-Administered Medications:     amoxicillin-clavulanate (AUGMENTIN) 875-125 mg per tablet 1 tablet, Oral, Q12H GERMÁN    Cholecalciferol (VITAMIN D3) tablet 1,000 Units, Oral, Daily    cyanocobalamin (VITAMIN B-12) tablet 1,000 mcg, Oral, Daily    fish oil capsule 1,000 mg, Oral, BID    lisdexamfetamine (VYVANSE) capsule 60 mg, Oral, Daily    melatonin tablet 3 mg, Oral, HS    multivitamin-minerals (CENTRUM) tablet 1 tablet, Oral, Daily    vitamin A capsule 10,000 Units, Oral, Daily    vitamin E (TOCOPHEROL) capsule 400 Units, Oral, Daily    vortioxetine (TRINTELLIX) tablet 20 mg, Oral, Daily      Risks/Benefits of Treatment:      Risks, benefits, and possible side effects of medications explained to patient and patient verbalizes understanding and agreement for treatment.    Progress Toward Goals: improving    Treatment Planning:     All current active medications have been reviewed.  Continue to monitor response to treatment and assess for potential side effects of medications.  Encourage group therapy, milieu therapy and occupational therapy.  Collaboration with medical service for medical comorbidities as indicated.  Behavioral Health checks for safety monitoring.  Estimated Discharge Day:  7 days (6/10/2025)  Legal Status : Voluntary 201 commitment.      Subjective     Behavior over the last 24 hours: improved.   According to the staff the patient has been pleasant and cooperative and feels improved but still complains of anxiety and depression.  She has been denying any SI/HI or AVH.  The patient also has bilateral rash which was reported to the medical and has been seen.  Benadryl was given.  Urinalysis has also been ordered.    I had examined the patient and the patient reported that today she is feeling much better.  She says that yesterday she was quite overwhelmed and had increasing anxiety but she is much more relaxed today.  She states that rash is still there but is not painful and she believes that there is some decrease.  The patient was started on antibiotic but she says that the rash appeared before she was put on antibiotic.  The patient states that in the past she has experienced sensitivity to various things however this time it is much more significant.  She reports that she also plans to go back home and take care of her appointments but wants to get better.  She says that her toothache is also better now.  Denies any hallucination or paranoid thoughts.  Does not report any thoughts of self-harm or harm to others.  She has been participating in various group activities.        Sleep: slept off and on  Appetite:  normal  Medication side effects: No  ROS: review of systems as noted above in HPI/Subjective report, all other systems are negative    Objective :  Temp:  [97.5 °F (36.4 °C)-98 °F (36.7 °C)] 98 °F (36.7 °C)  HR:  [] 86  BP: (119-190)/() 119/92  Resp:  [18] 18  SpO2:  [96 %] 96 %  O2 Device: None (Room air)    Mental Status Evaluation:    Appearance:  adequate grooming, dressed appropriately   Behavior:  pleasant, cooperative, more calm   Speech:  normal rate, normal volume, normal pitch   Mood:  less anxious   Affect:  mood-congruent   Thought Process:  organized, goal directed, logical   Thought Content:  no overt delusions   Perceptual Disturbances: no auditory hallucinations, no visual hallucinations   Risk Potential: Suicidal Ideation -  None at present  Homicidal Ideation -  None at present  Potential for Aggression - No   Sensorium:  oriented to person, place, and time/date   Memory:  recent and remote memory grossly intact   Consciousness:  alert and awake   Attention/Concentration: attention span and concentration are age appropriate   Insight:  intact   Judgment: good   Gait/Station: normal gait/station   Motor Activity: no abnormal movements       Lab Results: I have reviewed the following results:  Results from the past 24 hours:   Recent Results (from the past 24 hours)   ECG 12 lead    Collection Time: 06/02/25  8:26 PM   Result Value Ref Range    Ventricular Rate 100 BPM    Atrial Rate 100 BPM    MS Interval 138 ms    QRSD Interval 84 ms    QT Interval 356 ms    QTC Interval 460 ms    P Axis 55 degrees    QRS Axis 0 degrees    T Wave Axis 37 degrees   CBC and differential    Collection Time: 06/03/25  5:36 AM   Result Value Ref Range    WBC 7.03 4.31 - 10.16 Thousand/uL    RBC 4.74 3.81 - 5.12 Million/uL    Hemoglobin 13.7 11.5 - 15.4 g/dL    Hematocrit 41.7 34.8 - 46.1 %    MCV 88 82 - 98 fL    MCH 28.9 26.8 - 34.3 pg    MCHC 32.9 31.4 - 37.4 g/dL    RDW 13.9 11.6 - 15.1 %    MPV 10.8 8.9  - 12.7 fL    Platelets 340 149 - 390 Thousands/uL    nRBC 0 /100 WBCs    Segmented % 62 43 - 75 %    Immature Grans % 0 0 - 2 %    Lymphocytes % 27 14 - 44 %    Monocytes % 8 4 - 12 %    Eosinophils Relative 2 0 - 6 %    Basophils Relative 1 0 - 1 %    Absolute Neutrophils 4.36 1.85 - 7.62 Thousands/µL    Absolute Immature Grans 0.03 0.00 - 0.20 Thousand/uL    Absolute Lymphocytes 1.92 0.60 - 4.47 Thousands/µL    Absolute Monocytes 0.55 0.17 - 1.22 Thousand/µL    Eosinophils Absolute 0.13 0.00 - 0.61 Thousand/µL    Basophils Absolute 0.04 0.00 - 0.10 Thousands/µL   Comprehensive metabolic panel    Collection Time: 06/03/25  5:36 AM   Result Value Ref Range    Sodium 138 135 - 147 mmol/L    Potassium 4.0 3.5 - 5.3 mmol/L    Chloride 107 96 - 108 mmol/L    CO2 24 21 - 32 mmol/L    ANION GAP 7 4 - 13 mmol/L    BUN 18 5 - 25 mg/dL    Creatinine 0.69 0.60 - 1.30 mg/dL    Glucose 115 65 - 140 mg/dL    Glucose, Fasting 115 (H) 65 - 99 mg/dL    Calcium 9.7 8.4 - 10.2 mg/dL    AST 17 13 - 39 U/L    ALT 16 7 - 52 U/L    Alkaline Phosphatase 62 34 - 104 U/L    Total Protein 7.2 6.4 - 8.4 g/dL    Albumin 4.2 3.5 - 5.0 g/dL    Total Bilirubin 0.67 0.20 - 1.00 mg/dL    eGFR 101 ml/min/1.73sq m   Protime-INR    Collection Time: 06/03/25 11:05 AM   Result Value Ref Range    Protime 13.6 12.3 - 15.0 seconds    INR 0.99 0.85 - 1.19   UA w Reflex to Microscopic w Reflex to Culture    Collection Time: 06/03/25 11:19 AM    Specimen: Urine, Clean Catch   Result Value Ref Range    Color, UA Yellow Yellow, Straw    Clarity, UA Clear Hazy, Clear    Specific Gravity, UA 1.020 >1.005 - <1.030    pH, UA 6.0 5.0, 5.5, 6.0, 6.5, 7.0, 7.5    Leukocytes, UA Negative Negative    Nitrite, UA Negative Negative    Protein, UA Negative Negative, Interference- unable to analyze mg/dl    Glucose, UA Negative Negative mg/dl    Ketones, UA Negative Negative mg/dl    Urobilinogen, UA 0.2 0.2, 1.0 E.U./dl E.U./dl    Bilirubin, UA Negative Negative    Occult  Blood, UA Negative Negative       Administrative Statements     Counseling / Coordination of Care:   Patient's progress discussed with staff in treatment team meeting.  Medication changes reviewed with staff in treatment team meeting.        Alejandro Mars MD 06/03/25

## 2025-06-03 NOTE — PLAN OF CARE
Problem: Depression  Goal: Verbalize thoughts and feelings  Description: Interventions:  - Assess and re-assess patient's level of risk   - Engage patient in 1:1 interactions, daily, for a minimum of 15 minutes   - Encourage patient to express feelings, fears, frustrations, hopes   Outcome: Progressing  Goal: Refrain from isolation  Description: Interventions:  - Develop a trusting relationship   - Encourage socialization   Outcome: Progressing     Problem: Anxiety  Goal: Anxiety is at manageable level  Description: Interventions:  - Assess and monitor patient's anxiety level.   - Monitor for signs and symptoms (heart palpitations, chest pain, shortness of breath, headaches, nausea, feeling jumpy, restlessness, irritable, apprehensive).   - Collaborate with interdisciplinary team and initiate plan and interventions as ordered.  - [place] patient to unit/surroundings  - Explain treatment plan  - Encourage participation in care  - Encourage verbalization of concerns/fears  - Identify coping mechanisms  - Assist in developing anxiety-reducing skills  - Administer/offer alternative therapies  - Limit or eliminate stimulants  Outcome: Not Progressing

## 2025-06-03 NOTE — PROGRESS NOTES
06/02/25 0900   Team Meeting   Meeting Type Daily Rounds   Team Members Present   Team Members Present Physician;Nurse;Occupational Therapist;;   Physician Team Member Jerad STACY, TORI Del Rio MD   Nursing Team Member KAMLA Bob   Care Management Team Member MS Dante   Social Work Team Member CINDA Bermudez   OT Team Member RASHI Quesada   Patient/Family Present   Patient Present No   Patient's Family Present No     201. Feels more hopeful. Denies all. Eating/sleeping well. Med compliant. Social cooperative. D/C date not known due to med adjustment.

## 2025-06-03 NOTE — NURSING NOTE
"Patient has been very emotional and anxious this evening due to abrupt changes in her physical health that have occurred whilst admitted. She reports that while she has been willing to continue psychiatric treatment she feels it is quite difficult due to physical symptoms. She reports she felt relieved and hopeful mentally after a discussion with provider and her  but has \"spiraled\" since. She denies SI/HI and hallucinations. Q15 minute checks ongoing.   "

## 2025-06-04 PROCEDURE — 99232 SBSQ HOSP IP/OBS MODERATE 35: CPT | Performed by: PSYCHIATRY & NEUROLOGY

## 2025-06-04 RX ORDER — HYDROCORTISONE 25 MG/G
CREAM TOPICAL 3 TIMES DAILY PRN
Status: DISCONTINUED | OUTPATIENT
Start: 2025-06-04 | End: 2025-06-06 | Stop reason: HOSPADM

## 2025-06-04 RX ORDER — CHLORAL HYDRATE 500 MG
1000 CAPSULE ORAL DAILY
Status: DISCONTINUED | OUTPATIENT
Start: 2025-06-05 | End: 2025-06-06 | Stop reason: HOSPADM

## 2025-06-04 RX ORDER — GABAPENTIN 100 MG/1
100 CAPSULE ORAL 3 TIMES DAILY
Status: DISCONTINUED | OUTPATIENT
Start: 2025-06-04 | End: 2025-06-05

## 2025-06-04 RX ORDER — CHLORHEXIDINE GLUCONATE ORAL RINSE 1.2 MG/ML
15 SOLUTION DENTAL EVERY 12 HOURS SCHEDULED
Status: DISCONTINUED | OUTPATIENT
Start: 2025-06-04 | End: 2025-06-06 | Stop reason: HOSPADM

## 2025-06-04 RX ORDER — MIRTAZAPINE 15 MG/1
15 TABLET, FILM COATED ORAL
Status: DISCONTINUED | OUTPATIENT
Start: 2025-06-04 | End: 2025-06-06 | Stop reason: HOSPADM

## 2025-06-04 RX ORDER — TRAZODONE HYDROCHLORIDE 100 MG/1
100 TABLET ORAL
Status: DISCONTINUED | OUTPATIENT
Start: 2025-06-04 | End: 2025-06-06 | Stop reason: HOSPADM

## 2025-06-04 RX ADMIN — Medication 1000 UNITS: at 08:03

## 2025-06-04 RX ADMIN — MIRTAZAPINE 15 MG: 15 TABLET, FILM COATED ORAL at 22:04

## 2025-06-04 RX ADMIN — AMOXICILLIN AND CLAVULANATE POTASSIUM 1 TABLET: 875; 125 TABLET, FILM COATED ORAL at 22:04

## 2025-06-04 RX ADMIN — CHLORHEXIDINE GLUCONATE 15 ML: 1.2 SOLUTION ORAL at 12:45

## 2025-06-04 RX ADMIN — LISDEXAMFETAMINE DIMESYLATE 60 MG: 60 CAPSULE ORAL at 08:51

## 2025-06-04 RX ADMIN — Medication 3 MG: at 22:04

## 2025-06-04 RX ADMIN — AMOXICILLIN AND CLAVULANATE POTASSIUM 1 TABLET: 875; 125 TABLET, FILM COATED ORAL at 08:03

## 2025-06-04 RX ADMIN — GABAPENTIN 100 MG: 100 CAPSULE ORAL at 22:04

## 2025-06-04 RX ADMIN — IBUPROFEN 800 MG: 800 TABLET, FILM COATED ORAL at 02:17

## 2025-06-04 RX ADMIN — VORTIOXETINE 20 MG: 10 TABLET, FILM COATED ORAL at 08:03

## 2025-06-04 RX ADMIN — GABAPENTIN 100 MG: 100 CAPSULE ORAL at 16:09

## 2025-06-04 RX ADMIN — CHLORHEXIDINE GLUCONATE 15 ML: 1.2 SOLUTION ORAL at 22:06

## 2025-06-04 RX ADMIN — Medication 1 TABLET: at 08:03

## 2025-06-04 NOTE — PLAN OF CARE
Problem: Knowledge Deficit  Goal: Patient/family/caregiver demonstrates understanding of disease process, treatment plan, medications, and discharge instructions  Description: Complete learning assessment and assess knowledge base.  Interventions:  - Provide teaching at level of understanding  - Provide teaching via preferred learning methods  Outcome: Progressing     Problem: Ineffective Coping  Goal: Participates in unit activities  Description: Interventions:  - Provide therapeutic environment   - Provide required programming   - Redirect inappropriate behaviors   Outcome: Progressing   Pt participates in groups

## 2025-06-04 NOTE — PROGRESS NOTES
06/04/25    Team Meeting   Meeting Type Daily Rounds   Team Members Present   Team Members Present Physician;Nurse;;;Occupational Therapist   Physician Team Member Jerad STACY;Deepika STACY; TORI Bray   Nursing Team Member KAMLA Alicia   Care Management Team Member MS Dante   Social Work Team Member CINDA Bermudez   OT Team Member RASHI Quesada; RASHI Kunz   Patient/Family Present   Patient Present No   Patient's Family Present No   201. Rash. Denies all. Eating/sleeping well. Med compliant. Attending groups. Call collateral to confirm   discharge. Rescinded 72 hr. D/C 6-4.

## 2025-06-04 NOTE — ASSESSMENT & PLAN NOTE
Increase Trintellix to 20 mg daily for depressive symptoms.  Following changes were made on this encounter.    Decrease Trintellix to 10 mg daily  Start Remeron 15 mg at bedtime to be titrated to 30 mg if tolerated.  Gabapentin 100 mg 3 times daily to be titrated if tolerated well

## 2025-06-04 NOTE — ASSESSMENT & PLAN NOTE
Restart Vyvanse 60 mg daily for ADHD symptoms.  PDMP reviewed.  Patient's  dropped off home supply and labeled bottle, sent to pharmacy.  Continue with Vyvanse

## 2025-06-04 NOTE — ASSESSMENT & PLAN NOTE
Increase Trintellix to 20 mg daily for anxiety symptoms.  Atarax 50 mg 3 times daily as needed for severe anxiety and panic attacks.  Started on Remeron 15 mg at bedtime and Trintellix has been reduced to 10 mg daily.

## 2025-06-04 NOTE — NURSING NOTE
"Patient cooperative with staff. Guarded and defensive in conversations with this writer Patient somatically preoccupied today. Reports \"I am not taking the vitamins anymore. I do not like what I am putting in my body.\" Verbal education provided, patient not receptive.\" Mood lability noted, patient preservative on medications and becomes pressured and defensive during conversation. Rash remains on bilateral legs (posterior and anterior) Rash is bumpy and red. Refer to pictures placed in chart 6/4/2024 @ 1118. Patient denies itching or pain. Believes rash is due to her scheduled vitmains. Provider Devan notified.     Patient continues to endorse severe anxiety. Denies SI/HI or AVH. Less focused on VS and BP today. Patient appears restless and anxious when observed in milieu and in conversations with staff. Staff support provided and patient educated on PRNs available for symptom management.     Patient visible in milieu, selectively social. Compliant with scheduled medications besides fish oil, Vitamin E, Vitamin A, and B12. Patient educated on importance of medication compliance.     Will continue to monitor. Continual q15 min rounding and safety checks in place.   "

## 2025-06-04 NOTE — NURSING NOTE
"When approached by this writer to give scheduled Gabapentin 100 mg. Patient defensive and guarded. Patient states, \"I do not want any psych medications.\" When asked by the writer why she is refusing medication patient states, \"because everything here is combative. I just told you I don't want any medications and now I have to explain myself. This is not what I want to do\" Patient educated on inpatient treatment; patient not receptive. Patient stated, \"Okay I would like to sign a 72 hr now.\" Patient dismissive and walked away from this writer.     Patient signed 72 hr notice 6/4/2025 @ 1611.   "

## 2025-06-04 NOTE — PROGRESS NOTES
"Progress Note - Mario Palm 50 y.o. female MRN: [id number]    Unit/Bed#: [facility] 218-01 Encounter: [id number]        Subjective:   Patient seen and examined at bedside after reviewing the chart and discussing the case with the caring staff.      Patient examined at bedside.  Patient continues with petechial rash on bilateral legs however appears to be the same.  Not spreading.  Denies pain or itchiness.  No areas of blistering.      Labs and UA WNL.     Physical Exam   Vitals: Blood pressure 126/80, pulse 97, temperature 97.6 °F (36.4 °C), temperature source Temporal, resp. rate 18, height 5' 8\" (1.727 m), weight 109 kg (240 lb 12.8 oz), last menstrual period 04/29/2021, SpO2 97%.,Body mass index is 36.61 kg/m².  Constitutional: He appears well-developed.   HEENT: PERR, EOMI, MMM  Cardiovascular: Normal rate and regular rhythm.    Pulmonary/Chest: Effort normal and breath sounds normal.   Abdomen: Soft, + BS, NT    Assessment/Plan:  Mario Palm is a(n) 50 y.o. female with MDD.     JOSEPH.  CPAP at bedtime.   Hyperlipidemia.  Continue fish oil 1,000 mg daily.   Chronic pain disorder.  Ibuprofen as needed.   Vitamin deficiencies.  Continue daily home supplements: MVI, Vitamin A, Vitamin E, Vitamin B12, Vitamin D.  D/c Vit A and E as possible causes of rash 6/4.   HRT.  Patient on estradiol-norethindrone (CombiPatch) 0.05-0.25 mg/day 2x weekly.  May have someone bring in from home.  Petechial rash, BL LE.   Patient asymptomatic other than signs of dental infection.  Repeat CBC, CMP, PT, UA WNL.  Trial steroid cream as needed.  Cont to monitor and follow up with PCP outpt.   Dental infection.  Start Augmentin 875-125 mg twice daily x 7 days 6/2.  Chlorhexidine rinse twice daily.  Motrin as needed for pain.     The patient was discussed with Dr. Mclaughlin and he is in agreement with the above note.    "

## 2025-06-04 NOTE — NURSING NOTE
"Patient went to check on patient after she was seen crying. Patient lying in bed, patient tearful. Patient focused on \"people talking about stuff up and down the hallway\". Staff did not overhear any conversations. Patient paranoid, feels people are talking about her. Patient reports that she doesn't \"want any medications\". Staff educated patient on medication compliance, patient voiced understanding. Q 15 maintained.  "

## 2025-06-04 NOTE — PROGRESS NOTES
06/03/25    Team Meeting   Meeting Type Daily Rounds   Team Members Present   Team Members Present Physician;Nurse;;;Occupational Therapist   Physician Team Member Jerad STACY; Deepika STACY; TORI Bray   Nursing Team Member KAMLA Bob   Care Management Team Member MS Dante   Social Work Team Member CINDA Bermudez   OT Team Member RASHI Quesada   Patient/Family Present   Patient Present No   Patient's Family Present No   201. Concerned with tooth and rash. Doesn't feel like she is being heard. Med compliant. Eating/sleeping well. Attending groups. D/C 6-4

## 2025-06-04 NOTE — PLAN OF CARE
Problem: Depression  Goal: Verbalize thoughts and feelings  Description: Interventions:  - Assess and re-assess patient's level of risk   - Engage patient in 1:1 interactions, daily, for a minimum of 15 minutes   - Encourage patient to express feelings, fears, frustrations, hopes   Outcome: Progressing     Problem: Anxiety  Goal: Anxiety is at manageable level  Description: Interventions:  - Assess and monitor patient's anxiety level.   - Monitor for signs and symptoms (heart palpitations, chest pain, shortness of breath, headaches, nausea, feeling jumpy, restlessness, irritable, apprehensive).   - Collaborate with interdisciplinary team and initiate plan and interventions as ordered.  - [place] patient to unit/surroundings  - Explain treatment plan  - Encourage participation in care  - Encourage verbalization of concerns/fears  - Identify coping mechanisms  - Assist in developing anxiety-reducing skills  - Administer/offer alternative therapies  - Limit or eliminate stimulants  Outcome: Progressing

## 2025-06-04 NOTE — NURSING NOTE
Pt endorsing anxiety related to not being able to sleep. Atarax 50 mg PO given per order. Will continue to monitor and reassess.

## 2025-06-04 NOTE — NURSING NOTE
"Patient presented to this writer stating, \"I will take my medication now. I do not want to be noncompliant.\" Gabapentin 100 gm Po given. Mouth check performed. Will continue to monitor.   "

## 2025-06-04 NOTE — NURSING NOTE
Patient experienced disrupted sleep. Respirations even and unlabored. No acute behaviors. Q15 minute checks continue.

## 2025-06-04 NOTE — PROGRESS NOTES
Progress Note - Behavioral Health   Name: Mario Palm 50 y.o. female I MRN: [id number]  Unit/Bed#: -01 I Date of Admission: 5/29/2025   Date of Service: 6/4/2025 I Hospital Day: 6    Assessment & Plan  Major depression, recurrent (HCC)  Increase Trintellix to 20 mg daily for depressive symptoms.  Following changes were made on this encounter.    Decrease Trintellix to 10 mg daily  Start Remeron 15 mg at bedtime to be titrated to 30 mg if tolerated.  Gabapentin 100 mg 3 times daily to be titrated if tolerated well  Attention deficit hyperactivity disorder (ADHD), combined type  Restart Vyvanse 60 mg daily for ADHD symptoms.  PDMP reviewed.  Patient's  dropped off home supply and labeled bottle, sent to pharmacy.  Continue with Vyvanse    Generalized anxiety disorder  Increase Trintellix to 20 mg daily for anxiety symptoms.  Atarax 50 mg 3 times daily as needed for severe anxiety and panic attacks.  Started on Remeron 15 mg at bedtime and Trintellix has been reduced to 10 mg daily.    Current Medications:    Current Facility-Administered Medications:     amoxicillin-clavulanate (AUGMENTIN) 875-125 mg per tablet 1 tablet, Oral, Q12H GERMÁN    chlorhexidine (PERIDEX) 0.12 % oral rinse 15 mL, Swish & Spit, Q12H GERMÁN    Cholecalciferol (VITAMIN D3) tablet 1,000 Units, Oral, Daily    [START ON 6/5/2025] cyanocobalamin (VITAMIN B-12) tablet 500 mcg, Oral, Daily    [START ON 6/5/2025] fish oil capsule 1,000 mg, Oral, Daily    gabapentin (NEURONTIN) capsule 100 mg, Oral, TID    lisdexamfetamine (VYVANSE) capsule 60 mg, Oral, Daily    melatonin tablet 3 mg, Oral, HS    mirtazapine (REMERON) tablet 15 mg, Oral, HS    multivitamin-minerals (CENTRUM) tablet 1 tablet, Oral, Daily    [START ON 6/5/2025] vortioxetine (TRINTELLIX) tablet 10 mg, Oral, Daily      Risks/Benefits of Treatment:     Risks, benefits, and possible side effects of medications explained to patient and patient verbalizes understanding and agreement  for treatment.    Progress Toward Goals: The patient still remains symptomatic except decrease in suicidal thinking but moods are still depressed and has increased anxiety, improving slowly    Treatment Planning:     All current active medications have been reviewed.  Continue to monitor response to treatment and assess for potential side effects of medications.  Encourage group therapy, milieu therapy and occupational therapy.  Collaboration with medical service for medical comorbidities as indicated.  Behavioral Health checks for safety monitoring.  Estimated Discharge Day:  7 days (6/11/2025)  Legal Status : Voluntary 201 commitment.      Subjective   The staff reports that the patient is cooperative but remains guarded and defensive with somatic preoccupation.  Mood lability is also noted in she is noted to be perseverative according to the staff the patient still continues to have the rash.  She also reports anxiety feeling restless.  I evaluated the patient for follow-up.  She continues to report feeling anxious and also having depressed mood.  The patient says that she was having difficulty focusing and concentrating.  She is noted to be restless with pressured speech.  The patient is still worried how she is going to handle the situation at home.  She says that she has benefited from the group and has been learning to cope with the situation.  The patient did report that she was not able to sleep well last night.  She stated that she tried various medication but had difficulty falling asleep.  Found her mind racing.    Behavior over the last 24 hours: limited improvement         Sleep: difficulty falling asleep  Appetite: normal  Medication side effects: No  ROS: review of systems as noted above in HPI/Subjective report, all other systems are negative    Objective :  Temp:  [97.6 °F (36.4 °C)-97.9 °F (36.6 °C)] 97.9 °F (36.6 °C)  HR:  [] 101  BP: (126-136)/() 136/99  Resp:  [18] 18  SpO2:  [97 %]  97 %    Mental Status Evaluation:    Appearance:  age appropriate, casually dressed, adequate grooming   Behavior:  cooperative, restless and fidgety   Speech:  normal volume, normal pitch, increased rate, pressured   Mood:  depressed   Affect:  appropriate   Thought Process:  organized, goal directed, logical   Thought Content:  no overt delusions   Perceptual Disturbances: no auditory hallucinations, no visual hallucinations   Risk Potential: Suicidal Ideation -  None at present  Homicidal Ideation -  None at present  Potential for Aggression - Not at present   Sensorium:  oriented to person, place, and time/date   Memory:  recent and remote memory grossly intact   Consciousness:  alert and awake   Attention/Concentration: attention span and concentration are age appropriate   Insight:  fair   Judgment: fair   Gait/Station: normal gait/station   Motor Activity: no abnormal movements       Lab Results: I have reviewed the following results:  Most Recent Labs:   Lab Results   Component Value Date    WBC 7.03 06/03/2025    RBC 4.74 06/03/2025    HGB 13.7 06/03/2025    HCT 41.7 06/03/2025     06/03/2025    RDW 13.9 06/03/2025    NEUTROABS 4.36 06/03/2025    SODIUM 138 06/03/2025    K 4.0 06/03/2025     06/03/2025    CO2 24 06/03/2025    BUN 18 06/03/2025    CREATININE 0.69 06/03/2025    GLUC 115 06/03/2025    CALCIUM 9.7 06/03/2025    AST 17 06/03/2025    ALT 16 06/03/2025    ALKPHOS 62 06/03/2025    TP 7.2 06/03/2025    ALB 4.2 06/03/2025    TBILI 0.67 06/03/2025    CHOLESTEROL 176 05/30/2025    HDL 42 (L) 05/30/2025    TRIG 137 05/30/2025    LDLCALC 107 (H) 05/30/2025    NONHDLC 134 05/30/2025    BTO3NQMBTLTD 1.835 05/30/2025    FREET4 0.86 07/01/2019    TREPONEMAPA Non-reactive 05/30/2025    HGBA1C 5.5 02/27/2024     02/27/2024       Administrative Statements     Counseling / Coordination of Care:   Patient's progress discussed with staff in treatment team meeting.  Medication changes reviewed  "with staff in treatment team meeting.    Portions of the record may have been created with voice recognition software. Occasional wrong word or \"sound a like\" substitutions may have occurred due to the inherent limitations of voice recognition software. Read the chart carefully and recognize, using context, where substitutions have occurred.    Alejandro Mars MD 06/04/25  "

## 2025-06-04 NOTE — SOCIAL WORK
CM met with pt to follow up with her recent meeting with Dr. Mars. Pt reported that it would be better for her to go on Monday so that her medications can be adjusted and monitor her until that time. Pt stated that she was fine with that. TEE informed pt that she wanted to reach out to her  and she reported that it would better for her to communicate him via email. TEE sent an email to her  Michael with an update and pt's  acknowledged that he got the information about her discharge.

## 2025-06-05 PROCEDURE — 84446 ASSAY OF VITAMIN E: CPT

## 2025-06-05 PROCEDURE — 99232 SBSQ HOSP IP/OBS MODERATE 35: CPT | Performed by: PSYCHIATRY & NEUROLOGY

## 2025-06-05 RX ORDER — HYDROCORTISONE 25 MG/G
CREAM TOPICAL 3 TIMES DAILY PRN
Qty: 28 G | Refills: 0 | Status: SHIPPED | OUTPATIENT
Start: 2025-06-05

## 2025-06-05 RX ORDER — CHLORAL HYDRATE 500 MG
1000 CAPSULE ORAL DAILY
Qty: 30 CAPSULE | Refills: 0 | Status: SHIPPED | OUTPATIENT
Start: 2025-06-06

## 2025-06-05 RX ORDER — GABAPENTIN 100 MG/1
200 CAPSULE ORAL 3 TIMES DAILY
Status: DISCONTINUED | OUTPATIENT
Start: 2025-06-05 | End: 2025-06-06 | Stop reason: HOSPADM

## 2025-06-05 RX ORDER — LISDEXAMFETAMINE DIMESYLATE 60 MG/1
60 CAPSULE ORAL DAILY
Qty: 10 CAPSULE | Refills: 0 | Status: SHIPPED | OUTPATIENT
Start: 2025-06-06 | End: 2025-06-17

## 2025-06-05 RX ORDER — MIRTAZAPINE 15 MG/1
15 TABLET, FILM COATED ORAL
Qty: 10 TABLET | Refills: 0 | Status: SHIPPED | OUTPATIENT
Start: 2025-06-05 | End: 2025-06-17

## 2025-06-05 RX ORDER — GABAPENTIN 100 MG/1
200 CAPSULE ORAL 3 TIMES DAILY
Qty: 60 CAPSULE | Refills: 0 | Status: SHIPPED | OUTPATIENT
Start: 2025-06-05 | End: 2025-06-17

## 2025-06-05 RX ADMIN — OMEGA-3 FATTY ACIDS CAP 1000 MG 1000 MG: 1000 CAP at 08:16

## 2025-06-05 RX ADMIN — CYANOCOBALAMIN TAB 500 MCG 500 MCG: 500 TAB at 08:16

## 2025-06-05 RX ADMIN — Medication 3 MG: at 22:05

## 2025-06-05 RX ADMIN — CHLORHEXIDINE GLUCONATE 15 ML: 1.2 SOLUTION ORAL at 08:16

## 2025-06-05 RX ADMIN — MIRTAZAPINE 15 MG: 15 TABLET, FILM COATED ORAL at 22:05

## 2025-06-05 RX ADMIN — CHLORHEXIDINE GLUCONATE 15 ML: 1.2 SOLUTION ORAL at 22:05

## 2025-06-05 RX ADMIN — GABAPENTIN 200 MG: 100 CAPSULE ORAL at 17:26

## 2025-06-05 RX ADMIN — AMOXICILLIN AND CLAVULANATE POTASSIUM 1 TABLET: 875; 125 TABLET, FILM COATED ORAL at 08:16

## 2025-06-05 RX ADMIN — Medication 1 TABLET: at 08:16

## 2025-06-05 RX ADMIN — GABAPENTIN 200 MG: 100 CAPSULE ORAL at 22:05

## 2025-06-05 RX ADMIN — AMOXICILLIN AND CLAVULANATE POTASSIUM 1 TABLET: 875; 125 TABLET, FILM COATED ORAL at 22:04

## 2025-06-05 RX ADMIN — VORTIOXETINE 10 MG: 10 TABLET, FILM COATED ORAL at 08:16

## 2025-06-05 RX ADMIN — GABAPENTIN 100 MG: 100 CAPSULE ORAL at 08:16

## 2025-06-05 RX ADMIN — Medication 1000 UNITS: at 08:16

## 2025-06-05 RX ADMIN — LISDEXAMFETAMINE DIMESYLATE 60 MG: 60 CAPSULE ORAL at 08:16

## 2025-06-05 NOTE — SOCIAL WORK
CM spoke to Mario's   Michael [phone]. regarding her wife's discharge tomorrow. CM informed pt that she was going to be discharge tomorrow and asked who would be picking her up.

## 2025-06-05 NOTE — ASSESSMENT & PLAN NOTE
Trintellix decreased to 10 mg daily  Remeron is continued at 15 mg at bedtime  Gabapentin increased to 200 mg 3 times daily

## 2025-06-05 NOTE — PROGRESS NOTES
Progress Note - Behavioral Health   Name: Mario Palm 50 y.o. female I MRN: [id number]  Unit/Bed#: -01 I Date of Admission: 5/29/2025   Date of Service: 6/5/2025 I Hospital Day: 7    Assessment & Plan  Major depression, recurrent (HCC)    Following changes were made on this encounter.  Decrease Trintellix to 10 mg daily  Start Remeron 15 mg at bedtime to be titrated to 30 mg if tolerated.  Gabapentin increased to 200 mg 3 times daily  Attention deficit hyperactivity disorder (ADHD), combined type  Restart Vyvanse 60 mg daily for ADHD symptoms.  PDMP reviewed.  Patient's  dropped off home supply and labeled bottle, sent to pharmacy.  Continue with Vyvanse    Generalized anxiety disorder  Trintellix decreased to 10 mg daily  Remeron is continued at 15 mg at bedtime  Gabapentin increased to 200 mg 3 times daily  Post traumatic stress disorder (PTSD)      Current Medications:    Current Facility-Administered Medications:     amoxicillin-clavulanate (AUGMENTIN) 875-125 mg per tablet 1 tablet, Oral, Q12H GERMÁN    chlorhexidine (PERIDEX) 0.12 % oral rinse 15 mL, Swish & Spit, Q12H GERMÁN    Cholecalciferol (VITAMIN D3) tablet 1,000 Units, Oral, Daily    cyanocobalamin (VITAMIN B-12) tablet 500 mcg, Oral, Daily    fish oil capsule 1,000 mg, Oral, Daily    gabapentin (NEURONTIN) capsule 100 mg, Oral, TID    lisdexamfetamine (VYVANSE) capsule 60 mg, Oral, Daily    melatonin tablet 3 mg, Oral, HS    mirtazapine (REMERON) tablet 15 mg, Oral, HS    multivitamin-minerals (CENTRUM) tablet 1 tablet, Oral, Daily    vortioxetine (TRINTELLIX) tablet 10 mg, Oral, Daily      Risks/Benefits of Treatment:     Risks, benefits, and possible side effects of medications explained to patient and patient verbalizes understanding and agreement for treatment.    Progress Toward Goals: Some improvement    Treatment Planning:     All current active medications have been reviewed.  Continue to monitor response to treatment and assess for  "potential side effects of medications.  Encourage group therapy, milieu therapy and occupational therapy.  Collaboration with medical service for medical comorbidities as indicated.  Behavioral Health checks for safety monitoring.  Estimated Discharge Day:  The patient to be discharged tomorrow since she has signed a 72-hour notice.  No grounds for involuntary commitment exist at this time.  Denies any thoughts of self-harm or harm to others.  Legal Status : Signed 72 hour notice.  Long Stay Certification : Not Applicable    Subjective     Behavior over the last 24 hours: limited improvement  According to the staff the patient has been guarded and somewhat anxious.  Remains discharged focused and will not resent 72-hour notice.  Some irritability is noted.  Remains selectively social and compliant with her medication and is agreeable to take the medication.    The patient was able to get good sleep last night and reported that she did not have any issues with it last night.  The patient however is somewhat anxious she says that environment in the unit is making her more anxious and worse.  She says that there are certain things she does not like and she would like to go home and see her psychiatrist for follow-up.  She stated \"I do not have any suicidal thoughts or homicidal thoughts\".  The patient reports that she has a therapist that she would like to see.  She did report improvement with Neurontin saying that it did decrease her anxiety.  Remains focused on how other people are behaving on the unit.  Somewhat fearful especially in social situations saying that it causes more anxiety.  She is not reporting any auditory or visual hallucination or any overt delusional thinking.  Speech however remains rapid.  No behaviors as being have been exhibited.  The patient was initially reluctant to take any medication but did take her medication later on.        Sleep: Slept better last night.  Appetite: normal  Medication " "side effects: None reported  ROS:  , review of systems as noted above in HPI/Subjective report, all other systems are negative    Objective :  Temp:  [97.9 °F (36.6 °C)-98.7 °F (37.1 °C)] 98.7 °F (37.1 °C)  HR:  [] 91  BP: (136-165)/(80-99) 165/80  Resp:  [18] 18  SpO2:  [97 %-98 %] 98 %  O2 Device: None (Room air)    Mental Status Evaluation:    Appearance:  age appropriate, adequate grooming   Behavior:  cooperative, restless   Speech:  increased rate, hypertalkative   Mood:  \"I feel better today   Affect:  labile   Thought Process:  circumstantial, increased rate of thoughts   Thought Content:  some paranoia, however other people are not doing things that they are supposed to do and was somewhat vague about it.   Perceptual Disturbances: no auditory hallucinations, no visual hallucinations   Risk Potential: Suicidal Ideation -  None at present  Homicidal Ideation -  None at present  Potential for Aggression - No   Sensorium:  oriented to person, place, and time/date   Memory:  recent and remote memory grossly intact   Consciousness:  alert and awake   Attention/Concentration: attention span and concentration appear shorter than expected for age   Insight:  limited   Judgment: limited   Gait/Station: normal gait/station   Motor Activity: no abnormal movements       Lab Results: I have reviewed the following results:  Most Recent Labs:   Lab Results   Component Value Date    WBC 7.03 06/03/2025    RBC 4.74 06/03/2025    HGB 13.7 06/03/2025    HCT 41.7 06/03/2025     06/03/2025    RDW 13.9 06/03/2025    NEUTROABS 4.36 06/03/2025    SODIUM 138 06/03/2025    K 4.0 06/03/2025     06/03/2025    CO2 24 06/03/2025    BUN 18 06/03/2025    CREATININE 0.69 06/03/2025    GLUC 115 06/03/2025    CALCIUM 9.7 06/03/2025    AST 17 06/03/2025    ALT 16 06/03/2025    ALKPHOS 62 06/03/2025    TP 7.2 06/03/2025    ALB 4.2 06/03/2025    TBILI 0.67 06/03/2025    CHOLESTEROL 176 05/30/2025    HDL 42 (L) 05/30/2025    " "TRIG 137 05/30/2025    LDLCALC 107 (H) 05/30/2025    NONHDLC 134 05/30/2025    KGL1JFPHHEGE 1.835 05/30/2025    FREET4 0.86 07/01/2019    TREPONEMAPA Non-reactive 05/30/2025    HGBA1C 5.5 02/27/2024     02/27/2024       Administrative Statements     Counseling / Coordination of Care:   Patient's progress discussed with staff in treatment team meeting.  Medication changes reviewed with staff in treatment team meeting.    Portions of the record may have been created with voice recognition software. Occasional wrong word or \"sound a like\" substitutions may have occurred due to the inherent limitations of voice recognition software. Read the chart carefully and recognize, using context, where substitutions have occurred.    Alejandro Mars MD 06/05/25  "

## 2025-06-05 NOTE — NURSING NOTE
"Patient guarded on approach; anxious body language noted during interview. Denies SI/HI or AVH. Patient remains discharged focused and not willing to rescind 72hr. Patient irritable after conversation with Provider today; \"I do not understand why he is saying he will 302 me.\" Verbal education provided; patient not receptive and walked away from this writer. \"I want to speak to the provider again so I can explain myself.\" Provider notified. Visible for needs/meals and selectively social. Compliant with medications today, \"I will take anything that is scheduled for me, nothing more.\" Appears paranoid/suspicious at times related to assessment questions and care being provided to her. Will continue to monitor. Continual q15 min rounding and safety checks in place.   "

## 2025-06-05 NOTE — SOCIAL WORK
TEE called [facility] to schedule a medication management appointment for pt. TEE was able to secure an appointment for pt on 6-11-25 at 2:30 virtual with  Hortencia Tamayo nurse practitioner.

## 2025-06-05 NOTE — SOCIAL WORK
CM called pt's therapist Heather Olson [phone] and left a message regarding schedule a therapy appointment.

## 2025-06-05 NOTE — DISCHARGE INSTR - AVS FIRST PAGE
A 10-day supply of psychotropic medication was submitted to [facility], [facility] to cover until next psychiatric medication management appointment 6/11/2025

## 2025-06-05 NOTE — DISCHARGE INSTR - OTHER ORDERS
You are being discharged to the [address] [place] [address], [place] [address] TELEPHONE [phone]    Triggers you have identified during your hospitalization that led to your admission of a regressed mood due to a family stressors. Coping skills you have identified during your hospitalization include outdoor activities as hiking, camping, kyaking;  and playing video games.  If you are unable to deal with your distressed mood alone please contact your therapist Heather Olson at [phone].  If that is not effective and you continue to have (ex: suicidal ideation, homicidal ideation, distressed mood, overwhelmed, in crisis) please contact Crisis by dialing [facility] Toll Free at [phone].        *[employer] [place] [employer]ness ([employer]) HELPLINE: 507.663.5093/Website: www.tasia.org  *Substance Abuse and Mental Health Services Administration([facility]) National Helpline, which is a confidential, free, 24-hour-a-day, 365-day-a-year, information service for individuals and family members facing mental health and/or substance use disorders. This service provides referrals to local treatment facilities, support groups, and community-based organizations. Callers can also order free publications and other information.  Call 1-857.478.1642/Website: www.[facility].gov  *[facility] 2-1-1: This is a toll free, confidential, 24-hour-a-day service which connects you to a community  in your area who can help you find services and resources that are available to you locally and provide critical services that can improve and save lives.  Call: 211  /Website: http://www.[employer] HQorg/         Dorita, or Harini, our Behavioral Health Nurse Navigators, will be calling you after your discharge, on the phone number that you provided.  They will be available as an additional support, if needed.   If you wish to speak with one of them, you may contact Dorita at [phone] or Harini at [phone]. 24/7  Mental Health Crisis Hotline  [place], [facility]  [phone]

## 2025-06-05 NOTE — ASSESSMENT & PLAN NOTE
Following changes were made on this encounter.  Decrease Trintellix to 10 mg daily  Start Remeron 15 mg at bedtime to be titrated to 30 mg if tolerated.  Gabapentin increased to 200 mg 3 times daily

## 2025-06-05 NOTE — SOCIAL WORK
CM met with pt to discuss her discharge. Pt informed CM that her  had eye surgery and that her daughter in law Maryjo would be picking her up at 10:00am and that her pharmacy was [facility] in [place]. CM addressed safety concerns with pt and encouraged her to utilize her support system and coping skills when her symptoms increase.

## 2025-06-05 NOTE — NUTRITION
25 1322   Biochemical Data,Medical Tests, and Procedures   Biochemical Data/Medical Tests/Procedures Lab values reviewed;Meds reviewed   Labs (Comment) 6/3 B, CBC WNL   Meds (Comment) cogentin, Vit D3, catapres, Vit B12, fish oil, haldol, neurontin, ativan, melatonin, remeron, centrum, desyrel   Nutrition-Focused Physical Exam   Nutrition-Focused Physical Exam Findings RN skin assessment reviewed;No skin issues documented   Nutrition-Focused Physical Exam Findings Dental infection; augmentin prescribed.   Medical-Related Concerns Age-related physical debility 2025    Anxiety     Arthritis   Patient-reported  Chronic post-traumatic stress disorder (PTSD)     Depression   Patient-reported  Dermatitis 2024    Endometriosis   Patient-reported  Fall 2025    Female infertility   Patient-reported  Fibrocystic breast 2012  Patient-reported  Fibroid   Patient-reported  Hair loss 2023    Headache(784.0)   Patient-reported  Hypertension     Memory loss   Patient-reported  Migraine   Patient-reported  Obesity     Pain in right knee 2019 No injury     Polycystic ovary syndrome   Patient-reported  Scoliosis   Patient-reported  Sleep apnea   Adequacy of Intake   Nutrition Modality PO   Feeding Route   PO Independent   Current PO Intake   Current Diet Order Regular diet thin liquids   Current Meal Intake %   Estimated calorie intake compared to estimated need Nutrient needs met.   PES Statement   Problem No nutrition diagnosis   Recommendations/Interventions   Malnutrition/BMI Present No  (does not meet criteria)   Summary Length of stay. Regular diet thin liquids. Meal completions 100%. Reports good appetite. Reports she eats mindfully. Consumes 1-2 meals per day. Resides with family. Reports she eats healthy but is tired of cooking at home. She states she avoids group meals in the facility at times. #; ; #. No significant weight changes. Dental  infection; augmentin prescribed. Skin intact.   Interventions/Recommendations Continue current diet order   Education Assessment   Education Education not indicated at this time   Nutrition Complexity Risk   Nutrition complexity level Low risk   Follow up date 06/19/25

## 2025-06-05 NOTE — PROGRESS NOTES
"Progress Note - Mario Palm 50 y.o. female MRN: [id number]    Unit/Bed#: [facility] 218-01 Encounter: [id number]        Subjective:   Patient seen and examined at bedside after reviewing the chart and discussing the case with the caring staff.      Patient examined at bedside.  Patient denies any acute symptoms.  Patient feels rash is improving.     Vit E pending.    Continue to monitor BP.     Physical Exam   Vitals: Blood pressure 165/80, pulse 91, temperature 98.7 °F (37.1 °C), temperature source Temporal, resp. rate 18, height 5' 8\" (1.727 m), weight 109 kg (240 lb 12.8 oz), last menstrual period 04/29/2021, SpO2 98%.,Body mass index is 36.61 kg/m².  Constitutional: He appears well-developed.   HEENT: PERR, EOMI, MMM  Cardiovascular: Normal rate and regular rhythm.    Pulmonary/Chest: Effort normal and breath sounds normal.   Abdomen: Soft, + BS, NT    Assessment/Plan:  Mario Palm is a(n) 50 y.o. female with MDD.     JOSEPH.  CPAP at bedtime.   Hyperlipidemia.  Continue fish oil 1,000 mg daily.   Chronic pain disorder.  Ibuprofen as needed.   Vitamin deficiencies.  Continue daily home supplements: MVI, Vitamin A, Vitamin E, Vitamin B12, Vitamin D.  D/c Vit A and E as possible causes of rash 6/4.   HRT.  Patient on estradiol-norethindrone (CombiPatch) 0.05-0.25 mg/day 2x weekly.  May have someone bring in from home.  Petechial rash, BL LE.   Patient asymptomatic other than signs of dental infection.  Repeat CBC, CMP, PT, UA WNL.  Trial steroid cream as needed.  Cont to monitor and follow up with PCP outpt.   Dental infection.  Start Augmentin 875-125 mg twice daily x 7 days 6/2.  Chlorhexidine rinse twice daily.  Motrin as needed for pain.     The patient was discussed with Dr. Mclaughlin and he is in agreement with the above note.    "

## 2025-06-06 VITALS
HEART RATE: 68 BPM | WEIGHT: 240.8 LBS | OXYGEN SATURATION: 98 % | RESPIRATION RATE: 18 BRPM | HEIGHT: 68 IN | TEMPERATURE: 97.5 F | BODY MASS INDEX: 36.49 KG/M2 | SYSTOLIC BLOOD PRESSURE: 110 MMHG | DIASTOLIC BLOOD PRESSURE: 71 MMHG

## 2025-06-06 PROCEDURE — 99239 HOSP IP/OBS DSCHRG MGMT >30: CPT | Performed by: PSYCHIATRY & NEUROLOGY

## 2025-06-06 RX ADMIN — GABAPENTIN 200 MG: 100 CAPSULE ORAL at 08:12

## 2025-06-06 RX ADMIN — Medication 1 TABLET: at 08:12

## 2025-06-06 RX ADMIN — OMEGA-3 FATTY ACIDS CAP 1000 MG 1000 MG: 1000 CAP at 08:12

## 2025-06-06 RX ADMIN — Medication 1000 UNITS: at 08:12

## 2025-06-06 RX ADMIN — AMOXICILLIN AND CLAVULANATE POTASSIUM 1 TABLET: 875; 125 TABLET, FILM COATED ORAL at 08:12

## 2025-06-06 RX ADMIN — CYANOCOBALAMIN TAB 500 MCG 500 MCG: 500 TAB at 08:12

## 2025-06-06 RX ADMIN — LISDEXAMFETAMINE DIMESYLATE 60 MG: 60 CAPSULE ORAL at 08:17

## 2025-06-06 RX ADMIN — VORTIOXETINE 10 MG: 10 TABLET, FILM COATED ORAL at 08:12

## 2025-06-06 NOTE — PROGRESS NOTES
"Progress Note - Mario Palm 50 y.o. female MRN: [id number]    Unit/Bed#: [facility] 218-01 Encounter: [id number]        Subjective:   Patient seen and examined at bedside after reviewing the chart and discussing the case with the caring staff.      Patient examined at bedside.  Patient denies any acute symptoms.  Patient feels rash has improving.     Vit E pending.      Patient is being discharged today, 6/6/25.     Physical Exam   Vitals: Blood pressure 110/71, pulse 68, temperature 97.5 °F (36.4 °C), temperature source Temporal, resp. rate 18, height 5' 8\" (1.727 m), weight 109 kg (240 lb 12.8 oz), last menstrual period 04/29/2021, SpO2 98%.,Body mass index is 36.61 kg/m².  Constitutional: He appears well-developed.   HEENT: PERR, EOMI, MMM  Cardiovascular: Normal rate and regular rhythm.    Pulmonary/Chest: Effort normal and breath sounds normal.   Abdomen: Soft, + BS, NT    Assessment/Plan:  Mario Palm is a(n) 50 y.o. female with MDD.     Medical Clearance: Patient is medically cleared for discharge. All prescriptions have been sent to pharmacy.     JOSEPH.  CPAP at bedtime.   Hyperlipidemia.  Continue fish oil 1,000 mg daily.   Chronic pain disorder.  Ibuprofen as needed.   Vitamin deficiencies.  Continue daily home supplements: MVI, Vitamin A, Vitamin E, Vitamin B12, Vitamin D.  D/c Vit A and E as possible causes of rash 6/4.   HRT.  Patient on estradiol-norethindrone (CombiPatch) 0.05-0.25 mg/day 2x weekly.  May have someone bring in from home.  Petechial rash, BL LE.   Patient asymptomatic other than signs of dental infection.  Repeat CBC, CMP, PT, UA WNL.  Trial steroid cream as needed.  Cont to monitor and follow up with PCP outpt.   Dental infection.  Start Augmentin 875-125 mg twice daily x 7 days 6/2.  Chlorhexidine rinse twice daily.  Motrin as needed for pain.     The patient was discussed with Dr. Mclaughlin and he is in agreement with the above note.    "

## 2025-06-06 NOTE — PROGRESS NOTES
06/05/25   Team Meeting   Meeting Type Daily Rounds   Team Members Present   Team Members Present Physician;Nurse;;Occupational Therapist   Physician Team Member Jerad STACY; MD Deepika;  TORI Bray   Nursing Team Member KAMLA Gillis   Care Management Team Member MS Dante   OT Team Member RASHI Quesada; RASHI Ramos   Patient/Family Present   Patient Present No   Patient's Family Present No   201, Possibly 302. Anxious. Somatic preoccupied. Refused psych meds in late afternoon. Paranoid about staff and peers. Took meds today. No morning goal meeting. Withdrawn. D/C no discharge date due to med adjustment.   N/A

## 2025-06-06 NOTE — BH TRANSITION RECORD
Contact Information: If you have any questions, concerns, pended studies, tests and/or procedures, or emergencies regarding your inpatient behavioral health visit. Please contact [facility] [phone] and ask to speak to a , nurse or physician. A contact is available 24 hours/ 7 days a week at this number.     Summary of Procedures Performed During your Stay:  Below is a list of major procedures performed during your hospital stay and a summary of results:  - No major procedures performed.    Pending Studies (From admission, onward)       Start     Ordered    06/04/25 0915  Vitamin E  Once         06/04/25 0914                  Please follow up on the above pending studies with your PCP and/or referring provider.
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
## 2025-06-06 NOTE — ASSESSMENT & PLAN NOTE
Restart Vyvanse 60 mg daily for ADHD symptoms.  PDMP reviewed.  Patient's  dropped off home supply and labeled bottle, sent to pharmacy.  Continue with Vyvanse   Continue on current medication upon discharge

## 2025-06-06 NOTE — ASSESSMENT & PLAN NOTE
Following changes were made on this encounter.  Decrease Trintellix to 10 mg daily  Start Remeron 15 mg at bedtime to be titrated to 30 mg if tolerated.  Gabapentin increased to 200 mg 3 times daily  Continue on current medication upon discharge

## 2025-06-06 NOTE — NURSING NOTE
Pt discharged home via main entrance of hospital via ambulation accompanied by BHT & belongings sent with pt which included pt's own medication of Vyvanase. Pt transported home via car with family member.

## 2025-06-06 NOTE — ASSESSMENT & PLAN NOTE
Trintellix decreased to 10 mg daily  Remeron is continued at 15 mg at bedtime  Gabapentin increased to 200 mg 3 times daily  Continue on current medication upon discharge

## 2025-06-06 NOTE — NURSING NOTE
Pt up ad marine & gait is steady. Pt is pleasant & socializes with other patients. Pt is cooperative & compliant with medications. Pt denies any depression or anxiety. Pt denies any hallucinations, suicidal or homicidal ideations. Q 15 min checks maintained to monitor pt's behavior & safety. Pt to be discharged home today. Pt instructed on discharge instructions & medications; & verbalized understanding of instructions.

## 2025-06-06 NOTE — PLAN OF CARE
Problem: PAIN - ADULT  Goal: Verbalizes/displays adequate comfort level or baseline comfort level  Description: Interventions:  - Encourage patient to monitor pain and request assistance  - Assess pain using appropriate pain scale  - Administer analgesics as ordered based on type and severity of pain and evaluate response  - Implement non-pharmacological measures as appropriate and evaluate response  - Consider cultural and social influences on pain and pain management  - Notify physician/advanced practitioner if interventions unsuccessful or patient reports new pain  - Educate patient/family on pain management process including their role and importance of  reporting pain   - Provide non-pharmacologic/complimentary pain relief interventions  Outcome: Completed     Problem: INFECTION - ADULT  Goal: Absence or prevention of progression during hospitalization  Description: INTERVENTIONS:  - Assess and monitor for signs and symptoms of infection  - Monitor lab/diagnostic results  - Monitor all insertion sites, i.e. indwelling lines, tubes, and drains  - Monitor endotracheal if appropriate and nasal secretions for changes in amount and color  - [place] appropriate cooling/warming therapies per order  - Administer medications as ordered  - Instruct and encourage patient and family to use good hand hygiene technique  - Identify and instruct in appropriate isolation precautions for identified infection/condition  Outcome: Completed  Goal: Absence of fever/infection during neutropenic period  Description: INTERVENTIONS:  - Monitor WBC  - Perform strict hand hygiene  - Limit to healthy visitors only  - No plants, dried, fresh or silk flowers with jacques in patient room  Outcome: Completed     Problem: SAFETY ADULT  Goal: Patient will remain free of falls  Description: INTERVENTIONS:  - Educate patient/family on patient safety including physical limitations  - Instruct patient to call for assistance with activity   -  Consider consulting OT/PT to assist with strengthening/mobility based on AM PAC & JH-HLM score  - Consult OT/PT to assist with strengthening/mobility   - Keep Call bell within reach  - Keep bed low and locked with side rails adjusted as appropriate  - Keep care items and personal belongings within reach  - Initiate and maintain comfort rounds  - Make Fall Risk Sign visible to staff  - Apply yellow socks and bracelet for high fall risk patients  - Consider moving patient to room near nurses station  Outcome: Completed  Goal: Maintains/Returns to pre admission functional level  Description: INTERVENTIONS:  - Perform AM-PAC 6 Click Basic Mobility/ Daily Activity assessment daily.  - Set and communicate daily mobility goal to care team and patient/family/caregiver.   - Collaborate with rehabilitation services on mobility goals if consulted  - Out of bed for toileting  - Record patient progress and toleration of activity level   Outcome: Completed     Problem: DISCHARGE PLANNING  Goal: Discharge to home or other facility with appropriate resources  Description: INTERVENTIONS:  - Identify barriers to discharge w/patient and caregiver  - Arrange for needed discharge resources and transportation as appropriate  - Identify discharge learning needs (meds, wound care, etc.)  - Arrange for interpretive services to assist at discharge as needed  - Refer to Case Management Department for coordinating discharge planning if the patient needs post-hospital services based on physician/advanced practitioner order or complex needs related to functional status, cognitive ability, or social support system  Outcome: Completed     Problem: Knowledge Deficit  Goal: Patient/family/caregiver demonstrates understanding of disease process, treatment plan, medications, and discharge instructions  Description: Complete learning assessment and assess knowledge base.  Interventions:  - Provide teaching at level of understanding  - Provide teaching  via preferred learning methods  Outcome: Completed     Problem: Ineffective Coping  Goal: Identifies ineffective coping skills  Outcome: Completed  Goal: Identifies healthy coping skills  Outcome: Completed  Goal: Demonstrates healthy coping skills  Outcome: Completed  Goal: Participates in unit activities  Description: Interventions:  - Provide therapeutic environment   - Provide required programming   - Redirect inappropriate behaviors   Outcome: Completed  Goal: Patient/Family participate in treatment and DC plans  Description: Interventions:  - Provide therapeutic environment  Outcome: Completed  Goal: Patient/Family verbalizes awareness of resources  Outcome: Completed     Problem: Risk for Self Injury/Neglect  Goal: Treatment Goal: Remain safe during length of stay, learn and adopt new coping skills, and be free of self-injurious ideation, impulses and acts at the time of discharge  Outcome: Completed  Goal: Verbalize thoughts and feelings  Description: Interventions:  - Assess and re-assess patient's lethality and potential for self-injury  - Engage patient in 1:1 interactions, daily, for a minimum of 15 minutes  - Encourage patient to express feelings, fears, frustrations, hopes  - Establish rapport/trust with patient   Outcome: Completed  Goal: Refrain from harming self  Description: Interventions:  - Monitor patient closely, per order  - Develop a trusting relationship  - Supervise medication ingestion, monitor effects and side effects   Outcome: Completed  Goal: Recognize maladaptive responses and adopt new coping mechanisms  Outcome: Completed     Problem: Depression  Goal: Treatment Goal: Demonstrate behavioral control of depressive symptoms, verbalize feelings of improved mood/affect, and adopt new coping skills prior to discharge  Outcome: Completed  Goal: Verbalize thoughts and feelings  Description: Interventions:  - Assess and re-assess patient's level of risk   - Engage patient in 1:1  interactions, daily, for a minimum of 15 minutes   - Encourage patient to express feelings, fears, frustrations, hopes   Outcome: Completed  Goal: Refrain from isolation  Description: Interventions:  - Develop a trusting relationship   - Encourage socialization   Outcome: Completed  Goal: Refrain from self-neglect  Outcome: Completed     Problem: Anxiety  Goal: Anxiety is at manageable level  Description: Interventions:  - Assess and monitor patient's anxiety level.   - Monitor for signs and symptoms (heart palpitations, chest pain, shortness of breath, headaches, nausea, feeling jumpy, restlessness, irritable, apprehensive).   - Collaborate with interdisciplinary team and initiate plan and interventions as ordered.  - [place] patient to unit/surroundings  - Explain treatment plan  - Encourage participation in care  - Encourage verbalization of concerns/fears  - Identify coping mechanisms  - Assist in developing anxiety-reducing skills  - Administer/offer alternative therapies  - Limit or eliminate stimulants  Outcome: Completed     Problem: Nutrition/Hydration-ADULT  Goal: Nutrient/Hydration intake appropriate for improving, restoring or maintaining nutritional needs  Description: Monitor and assess patient's nutrition/hydration status for malnutrition. Collaborate with interdisciplinary team and initiate plan and interventions as ordered.  Monitor patient's weight and dietary intake as ordered or per policy. Utilize nutrition screening tool and intervene as necessary. Determine patient's food preferences and provide high-protein, high-caloric foods as appropriate.     INTERVENTIONS:  - Monitor oral intake, urinary output, labs, and treatment plans  - Assess nutrition and hydration status and recommend course of action  - Evaluate amount of meals eaten  - Assist patient with eating if necessary   - Allow adequate time for meals  - Recommend/ encourage appropriate diets, oral nutritional supplements, and  vitamin/mineral supplements  - Order, calculate, and assess calorie counts as needed  - Recommend, monitor, and adjust tube feedings and TPN/PPN based on assessed needs  - Assess need for intravenous fluids  - Provide specific nutrition/hydration education as appropriate  - Include patient/family/caregiver in decisions related to nutrition  Outcome: Completed     Problem: Nutrition/Hydration-ADULT  Goal: Nutrient/Hydration intake appropriate for improving, restoring or maintaining nutritional needs  Description: Monitor and assess patient's nutrition/hydration status for malnutrition. Collaborate with interdisciplinary team and initiate plan and interventions as ordered.  Monitor patient's weight and dietary intake as ordered or per policy. Utilize nutrition screening tool and intervene as necessary. Determine patient's food preferences and provide high-protein, high-caloric foods as appropriate.     INTERVENTIONS:  - Monitor oral intake, urinary output, labs, and treatment plans  - Assess nutrition and hydration status and recommend course of action  - Evaluate amount of meals eaten  - Assist patient with eating if necessary   - Allow adequate time for meals  - Recommend/ encourage appropriate diets, oral nutritional supplements, and vitamin/mineral supplements  - Order, calculate, and assess calorie counts as needed  - Recommend, monitor, and adjust tube feedings and TPN/PPN based on assessed needs  - Assess need for intravenous fluids  - Provide specific nutrition/hydration education as appropriate  - Include patient/family/caregiver in decisions related to nutrition  Outcome: Completed

## 2025-06-06 NOTE — NURSING NOTE
Patient noted to be visible and social on the milieu. Denied SI/HI/AVH, depression, and anxiety. Affect bright on approach. Compliant w/ hs med regimen. Safety checks continuous and maintained.

## 2025-06-08 LAB
A-TOCOPHEROL VIT E SERPL-MCNC: 15.9 MG/L (ref 7–25.1)
GAMMA TOCOPHEROL SERPL-MCNC: 0.3 MG/L (ref 0.5–5.5)

## 2025-06-17 ENCOUNTER — HOSPITAL ENCOUNTER (OUTPATIENT)
Age: 50
Discharge: HOME | End: 2025-06-17
Attending: PHYSICIAN ASSISTANT
Payer: COMMERCIAL

## 2025-06-17 ENCOUNTER — OFFICE VISIT (OUTPATIENT)
Age: 50
End: 2025-06-17
Payer: COMMERCIAL

## 2025-06-17 VITALS
WEIGHT: 241 LBS | SYSTOLIC BLOOD PRESSURE: 140 MMHG | HEART RATE: 124 BPM | HEIGHT: 68 IN | RESPIRATION RATE: 16 BRPM | BODY MASS INDEX: 36.53 KG/M2 | OXYGEN SATURATION: 98 % | DIASTOLIC BLOOD PRESSURE: 98 MMHG

## 2025-06-17 VITALS — BODY MASS INDEX: 36.37 KG/M2 | HEIGHT: 68 IN | WEIGHT: 240 LBS

## 2025-06-17 DIAGNOSIS — Z91.09 HISTORY OF ENVIRONMENTAL ALLERGIES: ICD-10-CM

## 2025-06-17 DIAGNOSIS — R03.0 ELEVATED BLOOD PRESSURE READING: ICD-10-CM

## 2025-06-17 DIAGNOSIS — E78.2 MIXED HYPERLIPIDEMIA: ICD-10-CM

## 2025-06-17 DIAGNOSIS — Z87.898 HISTORY OF SEIZURES: ICD-10-CM

## 2025-06-17 DIAGNOSIS — E55.9 VITAMIN D DEFICIENCY: ICD-10-CM

## 2025-06-17 DIAGNOSIS — Z78.0 POSTMENOPAUSAL: ICD-10-CM

## 2025-06-17 DIAGNOSIS — R73.01 IMPAIRED FASTING GLUCOSE: ICD-10-CM

## 2025-06-17 DIAGNOSIS — F90.2 ATTENTION DEFICIT HYPERACTIVITY DISORDER (ADHD), COMBINED TYPE: ICD-10-CM

## 2025-06-17 DIAGNOSIS — R53.83 OTHER FATIGUE: ICD-10-CM

## 2025-06-17 DIAGNOSIS — E56.0 VITAMIN E DEFICIENCY: ICD-10-CM

## 2025-06-17 DIAGNOSIS — Z12.31 ENCOUNTER FOR SCREENING MAMMOGRAM FOR BREAST CANCER: ICD-10-CM

## 2025-06-17 DIAGNOSIS — F33.2 SEVERE EPISODE OF RECURRENT MAJOR DEPRESSIVE DISORDER, WITHOUT PSYCHOTIC FEATURES (HCC): ICD-10-CM

## 2025-06-17 DIAGNOSIS — G47.33 OBSTRUCTIVE SLEEP APNEA: ICD-10-CM

## 2025-06-17 DIAGNOSIS — Z00.00 ANNUAL PHYSICAL EXAM: Primary | ICD-10-CM

## 2025-06-17 PROBLEM — M47.817 LUMBOSACRAL SPONDYLOSIS WITHOUT MYELOPATHY: Status: RESOLVED | Noted: 2025-03-21 | Resolved: 2025-06-17

## 2025-06-17 PROBLEM — M47.816 SPONDYLOSIS OF LUMBAR REGION WITHOUT MYELOPATHY OR RADICULOPATHY: Status: ACTIVE | Noted: 2025-06-17

## 2025-06-17 PROBLEM — G56.00 CARPAL TUNNEL SYNDROME: Status: RESOLVED | Noted: 2025-03-21 | Resolved: 2025-06-17

## 2025-06-17 PROCEDURE — 77063 BREAST TOMOSYNTHESIS BI: CPT

## 2025-06-17 PROCEDURE — 99396 PREV VISIT EST AGE 40-64: CPT

## 2025-06-17 PROCEDURE — 77067 SCR MAMMO BI INCL CAD: CPT

## 2025-06-17 RX ORDER — ZOLPIDEM TARTRATE 10 MG/1
10 TABLET ORAL EVERY 24 HOURS
COMMUNITY

## 2025-06-17 NOTE — ASSESSMENT & PLAN NOTE
Recommended starting a weekly vitamin D supplement of 50,000 units.  Orders:    Vitamin D 1,25 Dihydroxy; Future

## 2025-06-17 NOTE — ASSESSMENT & PLAN NOTE
She was recently hospitalized for recurrent major depression and overdose.  She was admitted to the behavioral health unit for a week.  Her medications were adjusted and she was discharged.  She denies any active suicidal or homicidal ideation at this time.  She is seeing psych every 2 weeks and therapist twice weekly.  Continue Remeron 15 mg and Trintellix 10 mg daily.

## 2025-06-17 NOTE — PROGRESS NOTES
Adult Annual Physical  Name: Mario Palm      : 1975      MRN: [id number]  Encounter Provider: Harini Hutson PA-C  Encounter Date: 2025   Encounter department: [facility] INTERNAL MEDICINE [facility] [address]    :  Assessment & Plan  Annual physical exam  Recommended eating a well-balanced diet of fruits, veggies, and lean meats. Recommended increasing water intake, you should be urinating pale to clear yellow every 2-3 hours. Recommended moderate intensity exercise 30 mins 5x a week. She is sleeping better from the remeron. She denies any alcohol, tobacco, or illicit drug use. She is UTD with dentist and eye doctor. She lives at home with her , Michael.        Elevated blood pressure reading  BP in office is 144/100, on recheck it was 140/98. No home BP's. No personal hx of HTN. Family history of HTN.  Recommended checking blood pressure daily and keeping a log.  Follow-up in 1 week with blood pressure readings and blood pressure cuff.  She is currently asymptomatic in office today.  She does admit to an increase in anxiety.  ED protocols discussed. limit salt intake to <2,000 mg daily.  Encouraged weight loss.  Can consider clonidine due to history of anxiety and depression.       Obstructive sleep apnea  She uses CPAP machine nightly.        Severe episode of recurrent major depressive disorder, without psychotic features (HCC)  She was recently hospitalized for recurrent major depression and overdose.  She was admitted to the behavioral health unit for a week.  Her medications were adjusted and she was discharged.  She denies any active suicidal or homicidal ideation at this time.  She is seeing psych every 2 weeks and therapist twice weekly.  Continue Remeron 15 mg and Trintellix 10 mg daily.       Attention deficit hyperactivity disorder (ADHD), combined type  Currently stable on Vyvanse 60 mg daily.       Postmenopausal  Due for DEXA scan.  Orders:    DXA bone density spine hip and  pelvis; Future    Vitamin D deficiency  Recommended starting a weekly vitamin D supplement of 50,000 units.  Orders:    Vitamin D 1,25 Dihydroxy; Future    Mixed hyperlipidemia  LDL was slightly elevated at 107.  Reviewed a low-cholesterol diet.  Orders:    Lipid panel; Future    Ambulatory Referral to Nutrition Services; Future    Impaired fasting glucose    Orders:    Hemoglobin A1C; Future    Comprehensive metabolic panel; Future    Other fatigue    Orders:    CBC and differential; Future    TSH, 3rd generation with Free T4 reflex; Future    Vitamin E deficiency  Recommended increasing vitamin E and diet.  Orders:    Ambulatory Referral to Nutrition Services; Future    History of seizures  She notes a history of abnormal EEG when she was a kid would like referral to neurology at this time.  Orders:    Ambulatory Referral to Neurology; Future    History of environmental allergies  She notes a history of needing allergy shots as a kid and would like referral to allergy at this time.  Orders:    Ambulatory Referral to Allergy; Future        Preventive Screenings:  - Diabetes Screening: screening up-to-date  - Cholesterol Screening: has hyperlipidemia and screening up-to-date   - Hepatitis C screening: screening up-to-date   - HIV screening: screening up-to-date   - Cervical cancer screening: screening up-to-date   - Breast cancer screening: screening up-to-date   - Colon cancer screening: screening up-to-date   - Lung cancer screening: screening not indicated   - Osteoporosis screening: risks/benefits discussed and orders placed     Immunizations:    - Risks/benefits immunizations discussed      Counseling/Anticipatory Guidance:  - Alcohol: discussed moderation in alcohol intake and recommendations for healthy alcohol use.   - Drug use: discussed harms of illicit drug use and how it can negatively impact mental/physical health.   - Tobacco use: discussed harms of tobacco use and management options for quitting.   -  Dental health: discussed importance of regular tooth brushing, flossing, and dental visits.   - Sexual health: discussed sexually transmitted diseases, partner selection, use of condoms, avoidance of unintended pregnancy, and contraceptive alternatives.   - Diet: discussed recommendations for a healthy/well-balanced diet.   - Exercise: the importance of regular exercise/physical activity was discussed. Recommend exercise 3-5 times per week for at least 30 minutes.   - Injury prevention: discussed safety/seat belts, safety helmets, smoke detectors, carbon monoxide detectors, and smoking near bedding or upholstery.          History of Present Illness     Adult Annual Physical:  Patient presents for annual physical. Patient is a 50-year-old female that presents today for her annual physical exam.  She has multiple complaints today..     Diet and Physical Activity:  - Diet/Nutrition: well balanced diet. I'd like to consult with a nutrition  - Exercise: no formal exercise.    General Health:  - Sleep: sleeps well, > 8 hours of sleep on average and uses CPAP machine. This is new for me w/ recent medication added, Remeron  - Hearing: normal hearing bilateral ears.  - Vision: vision problems. dry eye, need readers. have proper care / doctors  - Dental: regular dental visits. have proper access    /GYN Health:  - Follows with GYN: yes.   - Menopause: postmenopausal.   - Last menstrual cycle: 1/1/2022.   - History of STDs: no  - Contraception: menopause.      Advanced Care Planning:  - Has an advanced directive?: no    - Has a durable medical POA?: no    - ACP document given to patient?: no      Review of Systems   Constitutional:  Negative for chills, fatigue and fever.   HENT:  Negative for ear discharge, ear pain, postnasal drip, rhinorrhea, sinus pressure, sinus pain, sore throat, tinnitus and trouble swallowing.    Eyes:  Negative for pain, discharge and itching.   Respiratory:  Negative for cough, shortness of breath  "and wheezing.    Cardiovascular:  Negative for chest pain, palpitations and leg swelling.   Gastrointestinal:  Negative for abdominal pain, constipation, diarrhea, nausea and vomiting.   Endocrine: Negative for polydipsia, polyphagia and polyuria.   Genitourinary:  Negative for difficulty urinating, frequency, hematuria and urgency.   Musculoskeletal:  Negative for arthralgias, joint swelling and myalgias.   Skin:  Negative for color change.   Allergic/Immunologic: Negative for environmental allergies.   Neurological:  Negative for dizziness, weakness, light-headedness, numbness and headaches.   Hematological:  Negative for adenopathy.   Psychiatric/Behavioral:  Negative for decreased concentration and sleep disturbance. The patient is not nervous/anxious.          Objective   /100 (BP Location: Left arm, Patient Position: Sitting, Cuff Size: Standard)   Pulse (!) 124   Resp 16   Ht 5' 8\" (1.727 m)   Wt 109 kg (241 lb)   LMP 01/01/2022   SpO2 98%   BMI 36.64 kg/m²     Physical Exam  Vitals and nursing note reviewed.   Constitutional:       General: She is awake. She is not in acute distress.     Appearance: Normal appearance. She is well-developed and well-groomed. She is obese.   HENT:      Head: Normocephalic and atraumatic.      Right Ear: Hearing, tympanic membrane, ear canal and external ear normal.      Left Ear: Hearing, tympanic membrane, ear canal and external ear normal.      Nose: Nose normal.      Mouth/Throat:      Lips: Pink.      Mouth: Mucous membranes are moist.     Eyes:      General: Lids are normal. Vision grossly intact. Gaze aligned appropriately.      Conjunctiva/sclera: Conjunctivae normal.      Pupils: Pupils are equal, round, and reactive to light.     Neck:      Vascular: No carotid bruit.      Trachea: Trachea and phonation normal.     Cardiovascular:      Rate and Rhythm: Normal rate and regular rhythm.      Heart sounds: Normal heart sounds, S1 normal and S2 normal. No " murmur heard.     No friction rub. No gallop.   Pulmonary:      Effort: Pulmonary effort is normal. No respiratory distress.      Breath sounds: Normal breath sounds and air entry. No decreased breath sounds, wheezing, rhonchi or rales.   Abdominal:      General: Abdomen is protuberant.     Musculoskeletal:         General: No swelling.      Cervical back: Neck supple.      Right lower leg: No edema.      Left lower leg: No edema.     Skin:     General: Skin is warm.      Capillary Refill: Capillary refill takes less than 2 seconds.     Neurological:      Mental Status: She is alert.     Psychiatric:         Attention and Perception: Attention and perception normal.         Mood and Affect: Mood and affect normal.         Speech: Speech normal.         Behavior: Behavior normal. Behavior is cooperative.         Thought Content: Thought content normal.         Cognition and Memory: Cognition and memory normal.         Judgment: Judgment normal.

## 2025-07-11 NOTE — TELEPHONE ENCOUNTER
Attempted to contact pt in reference to overdue colon screening and Diabetic Eye exam Unable to contact. No answer no voicemail setup. Pt was on the colon screening gap report    Pt  Notified